# Patient Record
Sex: FEMALE | Race: BLACK OR AFRICAN AMERICAN | Employment: OTHER | URBAN - METROPOLITAN AREA
[De-identification: names, ages, dates, MRNs, and addresses within clinical notes are randomized per-mention and may not be internally consistent; named-entity substitution may affect disease eponyms.]

---

## 2019-07-05 ENCOUNTER — ANESTHESIA EVENT (OUTPATIENT)
Dept: ENDOSCOPY | Age: 69
End: 2019-07-05
Payer: MEDICARE

## 2019-07-08 ENCOUNTER — ANESTHESIA (OUTPATIENT)
Dept: ENDOSCOPY | Age: 69
End: 2019-07-08
Payer: MEDICARE

## 2019-07-08 ENCOUNTER — HOSPITAL ENCOUNTER (OUTPATIENT)
Age: 69
Setting detail: OUTPATIENT SURGERY
Discharge: HOME | End: 2019-07-08
Attending: INTERNAL MEDICINE | Admitting: INTERNAL MEDICINE
Payer: MEDICARE

## 2019-07-08 VITALS
SYSTOLIC BLOOD PRESSURE: 170 MMHG | RESPIRATION RATE: 16 BRPM | TEMPERATURE: 98.7 F | OXYGEN SATURATION: 98 % | DIASTOLIC BLOOD PRESSURE: 74 MMHG | HEART RATE: 84 BPM | BODY MASS INDEX: 57.52 KG/M2 | HEIGHT: 60 IN | WEIGHT: 293 LBS

## 2019-07-08 LAB — GLUCOSE BLD STRIP.AUTO-MCNC: 135 MG/DL (ref 70–110)

## 2019-07-08 PROCEDURE — 74011250636 HC RX REV CODE- 250/636: Performed by: INTERNAL MEDICINE

## 2019-07-08 PROCEDURE — 74011000250 HC RX REV CODE- 250: Performed by: INTERNAL MEDICINE

## 2019-07-08 PROCEDURE — 74011250636 HC RX REV CODE- 250/636

## 2019-07-08 PROCEDURE — 76040000019: Performed by: INTERNAL MEDICINE

## 2019-07-08 PROCEDURE — 82962 GLUCOSE BLOOD TEST: CPT

## 2019-07-08 RX ORDER — SODIUM CHLORIDE 0.9 % (FLUSH) 0.9 %
5-40 SYRINGE (ML) INJECTION AS NEEDED
Status: CANCELLED | OUTPATIENT
Start: 2019-07-08

## 2019-07-08 RX ORDER — SODIUM CHLORIDE, SODIUM LACTATE, POTASSIUM CHLORIDE, CALCIUM CHLORIDE 600; 310; 30; 20 MG/100ML; MG/100ML; MG/100ML; MG/100ML
50 INJECTION, SOLUTION INTRAVENOUS CONTINUOUS
Status: DISCONTINUED | OUTPATIENT
Start: 2019-07-08 | End: 2019-07-08 | Stop reason: HOSPADM

## 2019-07-08 RX ORDER — INSULIN LISPRO 100 [IU]/ML
INJECTION, SOLUTION INTRAVENOUS; SUBCUTANEOUS ONCE
Status: DISCONTINUED | OUTPATIENT
Start: 2019-07-08 | End: 2019-07-08 | Stop reason: HOSPADM

## 2019-07-08 RX ORDER — SODIUM CHLORIDE 0.9 % (FLUSH) 0.9 %
5-40 SYRINGE (ML) INJECTION EVERY 8 HOURS
Status: CANCELLED | OUTPATIENT
Start: 2019-07-08

## 2019-07-08 RX ORDER — SODIUM CHLORIDE 0.9 % (FLUSH) 0.9 %
5-40 SYRINGE (ML) INJECTION AS NEEDED
Status: DISCONTINUED | OUTPATIENT
Start: 2019-07-08 | End: 2019-07-08 | Stop reason: HOSPADM

## 2019-07-08 RX ORDER — DEXTROMETHORPHAN/PSEUDOEPHED 2.5-7.5/.8
1.2 DROPS ORAL
Status: CANCELLED | OUTPATIENT
Start: 2019-07-08

## 2019-07-08 RX ORDER — HYDROMORPHONE HYDROCHLORIDE 4 MG/1
4 TABLET ORAL
COMMUNITY

## 2019-07-08 RX ORDER — MIDAZOLAM HYDROCHLORIDE 1 MG/ML
INJECTION, SOLUTION INTRAMUSCULAR; INTRAVENOUS AS NEEDED
Status: DISCONTINUED | OUTPATIENT
Start: 2019-07-08 | End: 2019-07-08 | Stop reason: HOSPADM

## 2019-07-08 RX ORDER — SODIUM CHLORIDE 0.9 % (FLUSH) 0.9 %
5-40 SYRINGE (ML) INJECTION EVERY 8 HOURS
Status: DISCONTINUED | OUTPATIENT
Start: 2019-07-08 | End: 2019-07-08 | Stop reason: HOSPADM

## 2019-07-08 RX ORDER — FENTANYL CITRATE 50 UG/ML
INJECTION, SOLUTION INTRAMUSCULAR; INTRAVENOUS
Status: DISCONTINUED
Start: 2019-07-08 | End: 2019-07-08 | Stop reason: HOSPADM

## 2019-07-08 RX ORDER — MIDAZOLAM HYDROCHLORIDE 1 MG/ML
INJECTION, SOLUTION INTRAMUSCULAR; INTRAVENOUS
Status: DISCONTINUED
Start: 2019-07-08 | End: 2019-07-08 | Stop reason: HOSPADM

## 2019-07-08 NOTE — PERIOP NOTES
Pt is a very difficult IV stick, after multiple attempts from RN and CRNA, anesthesia placed a 24g to right AC with good flash. Once pt was in procedure room and IV sedation was starting IV noted to be infiltrated, MD decided to have the procedure re-scheduled with PICC nurse to obtain IV access.

## 2019-07-08 NOTE — PERIOP NOTES
Pre-Op Summary    Pt arrived via car with family/friend and is oriented to time, place, person and situation. Patient with unsteady gait with wheelchair assistive devices. Visit Vitals  /68   Pulse 83   Temp 98.7 °F (37.1 °C)   Resp 20   Ht 5' (1.524 m)   Wt 150.8 kg (332 lb 8 oz)   SpO2 98%   BMI 64.94 kg/m²       Patients belongings are located with with daughter. Patient's point of contact is [employer]  and their contact number is: [phone]. They will be in the waiting room. They are able to receive medication information. They will be their ride home.

## 2019-07-08 NOTE — H&P
Date of Surgery Update: Uzma Aguirre was seen and examined. History and physical has been reviewed. The patient has been examined.  There have been no significant clinical changes since the completion of the originally dated History and Physical.    Signed By: Janelle Burns MD     July 8, 2019 11:21 AM

## 2019-07-08 NOTE — DISCHARGE INSTRUCTIONS
Patient armband removed and given to patient to take home. Patient was informed of the privacy risks if armband lost or stolen    DISCHARGE SUMMARY from Nurse    PATIENT INSTRUCTIONS:    After general anesthesia or intravenous sedation, for 24 hours or while taking prescription Narcotics:  · Limit your activities  · Do not drive and operate hazardous machinery  · Do not make important personal or business decisions  · Do  not drink alcoholic beverages  · If you have not urinated within 8 hours after discharge, please contact your surgeon on call. Report the following to your surgeon:  · Excessive pain, swelling, redness or odor of or around the surgical area  · Temperature over 100.5  · Nausea and vomiting lasting longer than 4 hours or if unable to take medications  · Any signs of decreased circulation or nerve impairment to extremity: change in color, persistent  numbness, tingling, coldness or increase pain  · Any questions    What to do at Home:    These are general instructions for a healthy lifestyle:    No smoking/ No tobacco products/ Avoid exposure to second hand smoke  Surgeon General's Warning:  Quitting smoking now greatly reduces serious risk to your health. Obesity, smoking, and sedentary lifestyle greatly increases your risk for illness    A healthy diet, regular physical exercise & weight monitoring are important for maintaining a healthy lifestyle    You may be retaining fluid if you have a history of heart failure or if you experience any of the following symptoms:  Weight gain of 3 pounds or more overnight or 5 pounds in a week, increased swelling in our hands or feet or shortness of breath while lying flat in bed. Please call your doctor as soon as you notice any of these symptoms; do not wait until your next office visit. The discharge information has been reviewed with the {PATIENT PARENT GUARDIAN:32836}. The {PATIENT PARENT GUARDIAN:40785} verbalized understanding.   Discharge medications reviewed with the {[employer] meds reviewed GOTS:17308} and appropriate educational materials and side effects teaching were provided.   ___________________________________________________________________________________________________________________________________

## 2019-07-08 NOTE — ANESTHESIA PREPROCEDURE EVALUATION
Relevant Problems   No relevant active problems       Anesthetic History   No history of anesthetic complications            Review of Systems / Medical History  Patient summary reviewed, nursing notes reviewed and pertinent labs reviewed    Pulmonary        Sleep apnea    Asthma        Neuro/Psych              Cardiovascular    Hypertension        Dysrhythmias   CAD    Exercise tolerance: >4 METS     GI/Hepatic/Renal     GERD           Endo/Other    Diabetes    Morbid obesity and arthritis     Other Findings              Physical Exam    Airway  Mallampati: III  TM Distance: 4 - 6 cm  Neck ROM: normal range of motion   Mouth opening: Normal     Cardiovascular  Regular rate and rhythm,  S1 and S2 normal,  no murmur, click, rub, or gallop  Rhythm: regular  Rate: normal         Dental  No notable dental hx       Pulmonary  Breath sounds clear to auscultation               Abdominal  GI exam deferred       Other Findings            Anesthetic Plan    ASA: 3  Anesthesia type: MAC          Induction: Intravenous  Anesthetic plan and risks discussed with: Patient

## 2019-07-08 NOTE — PERIOP NOTES
Phase 2 Recovery Summary  Patient arrived to Phase 2 at 1321  Report received from BOD[facility]  (ENDO)  Vitals:    07/08/19 1119 07/08/19 1304   BP: 148/68 170/74   Pulse: 83 84   Resp: 20 16   Temp: 98.7 °F (37.1 °C)    SpO2: 98% 98%   Weight: 150.8 kg (332 lb 8 oz)    Height: 5' (1.524 m)        oriented to time, place, person and situation    Patient's IV infiltrated in ENDO room. No peripheral IV in place upon arrival to phase 2 recovery. Warm compress placed to right arm for comfort. Instructed patient and daughter in waiting to take any sips of water for  15-20 minutes due to Lidocaine used in ENDO. Daughter and patient verbalized understanding.         Patient discharged to home with daughter  at [facility]

## 2019-07-22 ENCOUNTER — ANESTHESIA EVENT (OUTPATIENT)
Dept: ENDOSCOPY | Age: 69
End: 2019-07-22
Payer: MEDICARE

## 2019-07-23 ENCOUNTER — HOSPITAL ENCOUNTER (OUTPATIENT)
Age: 69
Setting detail: OUTPATIENT SURGERY
Discharge: HOME | End: 2019-07-23
Attending: INTERNAL MEDICINE | Admitting: INTERNAL MEDICINE
Payer: MEDICARE

## 2019-07-23 ENCOUNTER — ANESTHESIA (OUTPATIENT)
Dept: ENDOSCOPY | Age: 69
End: 2019-07-23
Payer: MEDICARE

## 2019-07-23 VITALS
HEART RATE: 86 BPM | WEIGHT: 293 LBS | HEIGHT: 60 IN | BODY MASS INDEX: 57.52 KG/M2 | OXYGEN SATURATION: 94 % | SYSTOLIC BLOOD PRESSURE: 125 MMHG | TEMPERATURE: 98.4 F | DIASTOLIC BLOOD PRESSURE: 58 MMHG | RESPIRATION RATE: 18 BRPM

## 2019-07-23 LAB
GLUCOSE BLD STRIP.AUTO-MCNC: 185 MG/DL (ref 70–110)
GLUCOSE BLD STRIP.AUTO-MCNC: 196 MG/DL (ref 70–110)

## 2019-07-23 PROCEDURE — 74011250636 HC RX REV CODE- 250/636: Performed by: NURSE ANESTHETIST, CERTIFIED REGISTERED

## 2019-07-23 PROCEDURE — 88342 IMHCHEM/IMCYTCHM 1ST ANTB: CPT

## 2019-07-23 PROCEDURE — 76040000019: Performed by: INTERNAL MEDICINE

## 2019-07-23 PROCEDURE — 77030021593 HC FCPS BIOP ENDOSC BSC -A: Performed by: INTERNAL MEDICINE

## 2019-07-23 PROCEDURE — 76060000031 HC ANESTHESIA FIRST 0.5 HR: Performed by: INTERNAL MEDICINE

## 2019-07-23 PROCEDURE — 88305 TISSUE EXAM BY PATHOLOGIST: CPT

## 2019-07-23 PROCEDURE — 74011250636 HC RX REV CODE- 250/636

## 2019-07-23 PROCEDURE — 74011636637 HC RX REV CODE- 636/637: Performed by: NURSE ANESTHETIST, CERTIFIED REGISTERED

## 2019-07-23 PROCEDURE — 82962 GLUCOSE BLOOD TEST: CPT

## 2019-07-23 RX ORDER — PROPOFOL 10 MG/ML
INJECTION, EMULSION INTRAVENOUS AS NEEDED
Status: DISCONTINUED | OUTPATIENT
Start: 2019-07-23 | End: 2019-07-23 | Stop reason: HOSPADM

## 2019-07-23 RX ORDER — CLOPIDOGREL BISULFATE 75 MG/1
75 TABLET ORAL
COMMUNITY
End: 2022-04-19

## 2019-07-23 RX ORDER — SODIUM CHLORIDE, SODIUM LACTATE, POTASSIUM CHLORIDE, CALCIUM CHLORIDE 600; 310; 30; 20 MG/100ML; MG/100ML; MG/100ML; MG/100ML
25 INJECTION, SOLUTION INTRAVENOUS CONTINUOUS
Status: DISCONTINUED | OUTPATIENT
Start: 2019-07-23 | End: 2019-07-23 | Stop reason: HOSPADM

## 2019-07-23 RX ORDER — SODIUM CHLORIDE 0.9 % (FLUSH) 0.9 %
5-40 SYRINGE (ML) INJECTION AS NEEDED
Status: DISCONTINUED | OUTPATIENT
Start: 2019-07-23 | End: 2019-07-23 | Stop reason: HOSPADM

## 2019-07-23 RX ORDER — DEXTROMETHORPHAN/PSEUDOEPHED 2.5-7.5/.8
1.2 DROPS ORAL
Status: DISCONTINUED | OUTPATIENT
Start: 2019-07-23 | End: 2019-07-23 | Stop reason: HOSPADM

## 2019-07-23 RX ORDER — SODIUM CHLORIDE 0.9 % (FLUSH) 0.9 %
5-40 SYRINGE (ML) INJECTION EVERY 8 HOURS
Status: DISCONTINUED | OUTPATIENT
Start: 2019-07-23 | End: 2019-07-23 | Stop reason: HOSPADM

## 2019-07-23 RX ORDER — MAGNESIUM SULFATE 100 %
4 CRYSTALS MISCELLANEOUS AS NEEDED
Status: CANCELLED | OUTPATIENT
Start: 2019-07-23

## 2019-07-23 RX ORDER — FAMOTIDINE 20 MG/1
20 TABLET, FILM COATED ORAL ONCE
Status: DISCONTINUED | OUTPATIENT
Start: 2019-07-23 | End: 2019-07-23 | Stop reason: HOSPADM

## 2019-07-23 RX ORDER — INSULIN LISPRO 100 [IU]/ML
INJECTION, SOLUTION INTRAVENOUS; SUBCUTANEOUS ONCE
Status: COMPLETED | OUTPATIENT
Start: 2019-07-23 | End: 2019-07-23

## 2019-07-23 RX ORDER — SODIUM CHLORIDE, SODIUM LACTATE, POTASSIUM CHLORIDE, CALCIUM CHLORIDE 600; 310; 30; 20 MG/100ML; MG/100ML; MG/100ML; MG/100ML
75 INJECTION, SOLUTION INTRAVENOUS CONTINUOUS
Status: CANCELLED | OUTPATIENT
Start: 2019-07-23

## 2019-07-23 RX ORDER — DIPHENHYDRAMINE HYDROCHLORIDE 50 MG/ML
25 INJECTION, SOLUTION INTRAMUSCULAR; INTRAVENOUS
Status: CANCELLED | OUTPATIENT
Start: 2019-07-23

## 2019-07-23 RX ADMIN — PROPOFOL 30 MG: 10 INJECTION, EMULSION INTRAVENOUS at 12:09

## 2019-07-23 RX ADMIN — SODIUM CHLORIDE, SODIUM LACTATE, POTASSIUM CHLORIDE, AND CALCIUM CHLORIDE: 600; 310; 30; 20 INJECTION, SOLUTION INTRAVENOUS at 11:30

## 2019-07-23 RX ADMIN — PROPOFOL 50 MG: 10 INJECTION, EMULSION INTRAVENOUS at 12:06

## 2019-07-23 RX ADMIN — INSULIN LISPRO 3 UNITS: 100 INJECTION, SOLUTION INTRAVENOUS; SUBCUTANEOUS at 11:51

## 2019-07-23 RX ADMIN — PROPOFOL 50 MG: 10 INJECTION, EMULSION INTRAVENOUS at 12:05

## 2019-07-23 NOTE — H&P
Date of Surgery Update: Oneyda Colunga was seen and examined. History and physical has been reviewed. The patient has been examined.  There have been no significant clinical changes since the completion of the originally dated History and Physical.    Signed By: Wendi Lee MD     July 23, 2019 11:22 AM

## 2019-07-23 NOTE — PROCEDURES
Endoscopy Procedure Note    Patient: Aria Davis MRN: [id number]  SSN: [id number]    YOB: 1950  Age: 79 y.o. Sex: female      Date/Time:  7/23/2019 12:14 PM    Esophagogastroduodenoscopy (EGD) Procedure Note    Procedure: Esophagogastroduodenoscopy with biopsy    IMPRESSION:   1. Gastritis. Biopsies taken for H. Pylori. 2. Small 5mm gastric polyp. Most likely fundic gland polyp. Biopsies taken. 3. Retained food, concerning for gastroparesis. 4. Otherwise normal EGD. RECOMMENDATIONS:  1. Resume regular diet. 2. Continue omeprazole. 3. Will contact with biopsy results in 1 week. 4. Follow up in office in 3-4 weeks. Indication: Abdominal pain, epigastric  :  Cristian Breaux MD  Assistants: Endoscopy RN-1: Jama Noriega RN    Referring Provider:   Randy Rosario MD  History: The history and physical exam were reviewed and updated. Endoscope: Olympus GIF-190 diagnostic endoscope  Extent of Exam: second portion of the duodenum  ASA: ASA 3 - Patient with moderate systemic disease with functional limitations  Anethesia/Sedation:  MAC anesthesia    Description of the procedure: The procedure was discussed with the patient including risks, benefits, alternatives including risks of iv sedation, bleeding, perforation and aspiration. A safety timeout was performed. The patient was placed in the left lateral decubitus position. A bite block was placed. The patient was given incremental doses of intravenous sedation until moderate sedation was achieved. The patients vital signs were monitored at all times including heart rate/rhythm, blood pressure and oxygen saturation. The endoscope was then passed under direct visualization to the second portion of the duodenum. The endoscope was then slowly withdrawn while visualizing the mucosa. In the stomach a retroflexion was performed and gastric fundus and cardia visualized.   The endoscope was then slowly withdrawn. The patient was then transferred to recovery in stable condition. Findings:    Esophagus: The esophageal mucosa was normal with no ulceration, mass or stricture. There was no evidence of Pelayo's esophagus or reflux esophagitis. Stomach: There was a large amount of food retained in the stomach. The gastric mucosa was edematous and erythematous. Biopsies taken for H. Pylori. There was a small 6mm sessile polyp in the gastric body. Biopsies taken. No ulceration or mass or stricture. Duodenum: The duodenum mucosa was normal with no ulceration, mass, stricture and no evidence of villous atrophy. Four pinch biopsies taken for celiac disease. Therapies:  None    Specimens: * No specimens in log *            Complications:   None; patient tolerated the procedure well.     EBL:None    Discharge disposition:  Home in the company of  when able to ambulate    Oj Del Castillo MD  July 23, 2019  12:14 PM

## 2019-07-23 NOTE — ANESTHESIA POSTPROCEDURE EVALUATION
Post-Anesthesia Evaluation and Assessment    Patient: Abhishek Osborn MRN: [id number]  SSN: [id number]    YOB: 1950  Age: 79 y.o. Sex: female       Cardiovascular Function/Vital Signs  Visit Vitals  /58   Pulse 88   Temp 36.9 °C (98.4 °F)   Resp 18   Ht 5' (1.524 m)   Wt 148.8 kg (328 lb)   SpO2 93%   BMI 64.06 kg/m²       Patient is status post MAC anesthesia for Procedure(s):  ESOPHAGOGASTRODUODENOSCOPY (EGD). Nausea/Vomiting: None    Postoperative hydration reviewed and adequate. Pain:  Pain Scale 1: Numeric (0 - 10) (07/23/19 1139)  Pain Intensity 1: 0 (07/23/19 1139)   Managed    Neurological Status: At baseline    Mental Status and Level of Consciousness: Alert and oriented     Pulmonary Status:   O2 Device: Room air (07/23/19 1218)   Adequate oxygenation and airway patent    Complications related to anesthesia: None    Post-anesthesia assessment completed. No concerns    Signed By: Rebecca Simons CRNA     July 23, 2019              Procedure(s):  ESOPHAGOGASTRODUODENOSCOPY (EGD).     MAC    <BSHSIANPOST>    Vitals Value Taken Time   /58 7/23/2019 12:18 PM   Temp     Pulse 88 7/23/2019 12:18 PM   Resp     SpO2 93 % 7/23/2019 12:18 PM

## 2019-07-23 NOTE — DISCHARGE INSTRUCTIONS
Patient Discharge Instructions    Tiffany Lassiter / [id number] : 1950    Admitted 2019 Discharged: 2019         Procedure Impression:  1. Gastritis. Biopsies taken for H. Pylori. 2. Small 5mm gastric polyp. Most likely fundic gland polyp. Biopsies taken. 3. Retained food, concerning for gastroparesis. 4. Otherwise normal EGD. Recommendation:  1. Resume regular diet. 2. Will contact with biopsy results in 1-2 weeks   3. Continue prilosec. 4. Follow up in office in 4 weeks. Recommended Diet: Regular Diet    Recommended Activity:    1. Do not drink alcohol, drive or operate machinery for 12 hours   2. Call if any fever, abdominal pain or bleeding noted. Signed By: Kenrick Altamirano MD     2019         Patient Education        Upper GI Endoscopy: What to Expect at [address] [place] [address]  After you have an endoscopy, you will stay at the hospital or clinic for 1 to 2 hours. This will allow the medicine to wear off. You will be able to go home after your doctor or nurse checks to make sure you are not having any problems. You may have to stay overnight if you had treatment during the test. You may have a sore throat for a day or two after the test.  This care sheet gives you a general idea about what to expect after the test.  How can you care for yourself at home? Activity  · Rest as much as you need to after you go home. · You should be able to go back to your usual activities the day after the test.  Diet  · Follow your doctor's directions for eating after the test.  · Drink plenty of fluids (unless your doctor has told you not to). Medications  · If you have a sore throat the day after the test, use an over-the-counter spray to numb your throat. Follow-up care is a key part of your treatment and safety. Be sure to make and go to all appointments, and call your doctor if you are having problems.  It's also a good idea to know your test results and keep a list of the medicines you take. When should you call for help? Call 911 anytime you think you may need emergency care. For example, call if:    · You passed out (loses consciousness).     · You have trouble breathing.     · You pass maroon or bloody stools.    Call your doctor now or seek immediate medical care if:    · You have pain that does not get better after your take pain medicine.     · You have new or worse belly pain.     · You have blood in your stools.     · You are sick to your stomach and cannot keep fluids down.     · You have a fever.     · You cannot pass stools or gas.    Watch closely for changes in your health, and be sure to contact your doctor if:    · Your throat still hurts after a day or two.     · You do not get better as expected. Where can you learn more? Go to http://ronaldo-adele.info/. Enter ([phone] in the search box to learn more about \"Upper GI Endoscopy: What to Expect at Home. \"  Current as of: November 7, 2018  Content Version: 12.1  © 3944-7190 [employer]. Care instructions adapted under license by [employer] (which disclaims liability or warranty for this information). If you have questions about a medical condition or this instruction, always ask your healthcare professional. [address] any warranty or liability for your use of this information.

## 2019-07-23 NOTE — ANESTHESIA PREPROCEDURE EVALUATION
Relevant Problems   No relevant active problems       Anesthetic History   No history of anesthetic complications            Review of Systems / Medical History  Patient summary reviewed and pertinent labs reviewed    Pulmonary        Sleep apnea: CPAP           Neuro/Psych   Within defined limits           Cardiovascular    Hypertension        Dysrhythmias       Exercise tolerance: >4 METS     GI/Hepatic/Renal  Within defined limits              Endo/Other    Diabetes: type 2    Morbid obesity     Other Findings   Comments:                  Physical Exam    Airway  Mallampati: III  TM Distance: 4 - 6 cm  Neck ROM: normal range of motion   Mouth opening: Normal     Cardiovascular  Regular rate and rhythm,  S1 and S2 normal,  no murmur, click, rub, or gallop  Rhythm: regular  Rate: normal         Dental  No notable dental hx       Pulmonary  Breath sounds clear to auscultation               Abdominal  GI exam deferred       Other Findings            Anesthetic Plan    ASA: 4  Anesthesia type: MAC          Induction: Intravenous  Anesthetic plan and risks discussed with: Patient

## 2019-07-23 NOTE — ADDENDUM NOTE
Addendum  created 07/23/19 1225 by Shanta Hernandes MD    Attestation recorded in [address] [place] [address], [facility] 97 filed

## 2020-01-01 PROBLEM — K52.9 COLITIS: Status: ACTIVE | Noted: 2020-01-01

## 2022-03-19 PROBLEM — K52.9 COLITIS: Status: ACTIVE | Noted: 2020-01-01

## 2022-04-19 ENCOUNTER — OFFICE VISIT (OUTPATIENT)
Dept: SURGERY | Age: 72
End: 2022-04-19
Payer: MEDICARE

## 2022-04-19 VITALS
WEIGHT: 293 LBS | BODY MASS INDEX: 57.52 KG/M2 | HEIGHT: 60 IN | TEMPERATURE: 97.5 F | HEART RATE: 81 BPM | OXYGEN SATURATION: 98 % | RESPIRATION RATE: 14 BRPM | SYSTOLIC BLOOD PRESSURE: 163 MMHG | DIASTOLIC BLOOD PRESSURE: 72 MMHG

## 2022-04-19 DIAGNOSIS — M15.9 PRIMARY OSTEOARTHRITIS INVOLVING MULTIPLE JOINTS: ICD-10-CM

## 2022-04-19 DIAGNOSIS — J45.50 UNCOMPLICATED SEVERE PERSISTENT ASTHMA: ICD-10-CM

## 2022-04-19 DIAGNOSIS — K21.9 GASTROESOPHAGEAL REFLUX DISEASE, UNSPECIFIED WHETHER ESOPHAGITIS PRESENT: ICD-10-CM

## 2022-04-19 DIAGNOSIS — E11.29 TYPE 2 DIABETES MELLITUS WITH OTHER KIDNEY COMPLICATION, UNSPECIFIED WHETHER LONG TERM INSULIN USE (HCC): ICD-10-CM

## 2022-04-19 DIAGNOSIS — E11.65 UNCONTROLLED TYPE 2 DIABETES MELLITUS WITH HYPERGLYCEMIA (HCC): ICD-10-CM

## 2022-04-19 DIAGNOSIS — E55.9 VITAMIN D DEFICIENCY: ICD-10-CM

## 2022-04-19 DIAGNOSIS — E11.43 DIABETIC GASTROPARESIS (HCC): ICD-10-CM

## 2022-04-19 DIAGNOSIS — E78.5 HYPERLIPIDEMIA, UNSPECIFIED HYPERLIPIDEMIA TYPE: ICD-10-CM

## 2022-04-19 DIAGNOSIS — I47.1 PSVT (PAROXYSMAL SUPRAVENTRICULAR TACHYCARDIA) (HCC): ICD-10-CM

## 2022-04-19 DIAGNOSIS — G47.33 OBSTRUCTIVE SLEEP APNEA SYNDROME: ICD-10-CM

## 2022-04-19 DIAGNOSIS — I25.10 CORONARY ARTERY DISEASE, UNSPECIFIED VESSEL OR LESION TYPE, UNSPECIFIED WHETHER ANGINA PRESENT, UNSPECIFIED WHETHER NATIVE OR TRANSPLANTED HEART: ICD-10-CM

## 2022-04-19 DIAGNOSIS — Z01.818 PREOP EXAMINATION: ICD-10-CM

## 2022-04-19 DIAGNOSIS — E66.01 MORBID OBESITY (HCC): Primary | ICD-10-CM

## 2022-04-19 DIAGNOSIS — R60.9 EDEMA, UNSPECIFIED TYPE: ICD-10-CM

## 2022-04-19 DIAGNOSIS — E55.9 VITAMIN D DEFICIENCY, UNSPECIFIED: ICD-10-CM

## 2022-04-19 DIAGNOSIS — M17.0 PRIMARY OSTEOARTHRITIS OF BOTH KNEES: ICD-10-CM

## 2022-04-19 DIAGNOSIS — I10 HYPERTENSION, UNSPECIFIED TYPE: ICD-10-CM

## 2022-04-19 DIAGNOSIS — R06.02 SOB (SHORTNESS OF BREATH): ICD-10-CM

## 2022-04-19 DIAGNOSIS — K31.84 DIABETIC GASTROPARESIS (HCC): ICD-10-CM

## 2022-04-19 DIAGNOSIS — I26.99 PULMONARY EMBOLISM, UNSPECIFIED CHRONICITY, UNSPECIFIED PULMONARY EMBOLISM TYPE, UNSPECIFIED WHETHER ACUTE COR PULMONALE PRESENT (HCC): ICD-10-CM

## 2022-04-19 PROBLEM — M25.512 CHRONIC PAIN OF BOTH SHOULDERS: Status: ACTIVE | Noted: 2021-07-07

## 2022-04-19 PROBLEM — M79.3 PANNICULITIS: Status: ACTIVE | Noted: 2019-03-18

## 2022-04-19 PROBLEM — M25.511 CHRONIC PAIN OF BOTH SHOULDERS: Status: ACTIVE | Noted: 2021-07-07

## 2022-04-19 PROBLEM — G89.29 CHRONIC PAIN OF BOTH SHOULDERS: Status: ACTIVE | Noted: 2021-07-07

## 2022-04-19 PROCEDURE — 99205 OFFICE O/P NEW HI 60 MIN: CPT | Performed by: SURGERY

## 2022-04-19 RX ORDER — HYOSCYAMINE SULFATE 0.12 MG/5ML
LIQUID ORAL
COMMUNITY
Start: 2015-01-01

## 2022-04-19 RX ORDER — INSULIN LISPRO 100 [IU]/ML
INJECTION, SOLUTION INTRAVENOUS; SUBCUTANEOUS
COMMUNITY

## 2022-04-19 RX ORDER — TIMOLOL/DORZOLAMIDE/LATANOP/PF 0.5-2-.005
DROPS OPHTHALMIC (EYE)
COMMUNITY
Start: 2022-02-22

## 2022-04-19 RX ORDER — PANTOPRAZOLE SODIUM 40 MG/1
40 TABLET, DELAYED RELEASE ORAL DAILY
COMMUNITY
Start: 2022-02-22

## 2022-04-19 NOTE — LETTER
4/19/2022    Patient: Tamar Vanegas   YOB: 1950   Date of Visit: 4/19/2022     Ariel Arceo  [address] [address]  Via Fax: [phone]    Dear Tino Maya MD,      MsEvans Musa Jorge visited with me at  [place] [address] for evaluation. My notes for this consultation are attached. If you have questions, please do not hesitate to call me. I look forward to following your patient along with you.       Sincerely,    Estrellita Hernandez MD

## 2022-04-19 NOTE — PROGRESS NOTES
Juana Diaz is a 73 y.o. female (: 1950) presenting to address:    Chief Complaint   Patient presents with    New Patient     bariatric consult       Medication list and allergies have been reviewed with Juana Diaz and updated as of today's date. I have gone over all Medical, Surgical and Social History with Juana Diaz and updated/added the information accordingly.

## 2022-04-19 NOTE — PROGRESS NOTES
Bariatric Surgery Consultation    CC: Consultation regarding surgical treatment options for morbid obesity and related comorbidities  Subjective: The patient is a 73 y.o. obese  female with a Body mass index is 66.4 kg/m². . They have been considering surgery for some time now. The patient presents to the clinic today to discuss surgical weight loss options. They have made multiple attempts at weight loss over the years without success. They have tried low-carb, low calorie, high-protein diets. Unfortunately, none of these have lead to meaningful, sustained weight loss. They have attended the bariatric seminar before the visit. History of saddle PE last year likely secondary to COVID infection, with embolectomy (IR). Denies nausea, vomiting, dysphagia  Reports reflux, well controlled with PPI. History of diabetic gastroparesis (intolerant of reglan).  Chronic bloating    Pre op weight: 349  EBW: 229  Goal Weight Calc Women: 165.8  Wt loss to date: 5     Patient Active Problem List    Diagnosis Date Noted    Edema 04/19/2022    Chronic pain of both shoulders 07/07/2021    Primary osteoarthritis of both knees 07/07/2021    Pulmonary embolism ([address] 04/17/2021    SOB (shortness of breath) 12/08/2020    Colitis 01/01/2020    Panniculitis 03/18/2019    Type II diabetes mellitus with complication, uncontrolled 11/25/2016    Preseptal cellulitis 40/40/5830    Uncomplicated severe persistent asthma 10/21/2015    Uncontrolled type 2 diabetes mellitus with hyperglycemia ([address] 07/29/2014    Right flank pain 06/24/2014    Diabetes mellitus with renal complications (HCC)     HTN (hypertension)     Hyperlipidemia     CAD (coronary artery disease)     RAD (reactive airway disease)     Diabetic gastroparesis (HCC)     PSVT (paroxysmal supraventricular tachycardia) (HCC)     Sleep apnea     Heel spur     GERD (gastroesophageal reflux disease)     DJD (degenerative joint disease), multiple sites Morbid obesity ([facility]     Vitamin D deficiency     Chronic pain of both knees 10/22/2007      Past Medical History:   Diagnosis Date    Asthma     CAD (coronary artery disease) 2005    insignif = 20% les mid LAD    Colitis     Colitis     Diabetes ([facility]     Diabetes mellitus with renal complications ([facility] 6647    Diabetic gastroparesis (HCC)     DJD (degenerative joint disease), multiple sites     GERD (gastroesophageal reflux disease)     Heel spur 2007    left    HTN (hypertension)     Hyperlipemia     Hyperlipidemia     Hypertension     Morbid obesity ([facility]     Osteoarthritis     PSVT (paroxysmal supraventricular tachycardia) ([facility] 2006    Pulmonary embolism (HCC)     RAD (reactive airway disease)     Sleep apnea 2007    CPAP    Vitamin D deficiency      Past Surgical History:   Procedure Laterality Date    COLONOSCOPY N/A 2/13/2020    DIAGNOSTIC COLONOSCOPY /c Hot Snared Polypectomy performed by Pippa Figueroa MD at [address] [place] [address]    HX BREAST REDUCTION      HX CHOLECYSTECTOMY  2018    HX SHOULDER ARTHROSCOPY  2008    left decompression    HX TUBAL LIGATION  1982    [place] LEG/ANKLE SURGERY PROC UNLISTED      benign cyst left ankle      Social History     Tobacco Use    Smoking status: Never Smoker    Smokeless tobacco: Never Used   Substance Use Topics    Alcohol use: No      Family History   Problem Relation Age of Onset    Cancer Brother         throat    Hypertension Sister     Diabetes Sister     Diabetes Sister     Hypertension Sister     Diabetes Brother       Prior to Admission medications    Medication Sig Start Date End Date Taking? Authorizing Provider   pantoprazole (PROTONIX) 40 mg tablet Take 40 mg by mouth daily.  2/22/22  Yes Provider, Historical   timolol/dorzolamide/latanop/PF (timoloL-dorzolamid-latanop,PF,) 0.5-2-0.005 % drop  2/22/22  Yes Provider, Historical   hyoscyamine (LEVSIN) 0.125 mg/5 mL elixir  1/1/15  Yes Provider, Historical   insulin glargine U-300 conc (TOUJEO MAX U-300 SOLOSTAR) 300 unit/mL (3 mL) inpn 150 Units by SubCUTAneous route nightly. Yes Provider, Historical   dicyclomine (BENTYL) 20 mg tablet Take  by mouth two (2) times a day. 1/14/20  Yes Provider, Historical   furosemide (LASIX) 20 mg tablet Take  by mouth daily. 12/23/19  Yes Provider, Historical   MYRBETRIQ 50 mg ER tablet Take  by mouth daily. 1/22/20  Yes Provider, Historical   celecoxib (CELEBREX) 200 mg capsule Take  by mouth daily. 12/6/19  Yes Provider, Historical   pregabalin (LYRICA) 150 mg capsule Take  by mouth daily. 12/22/19  Yes Provider, Historical   ondansetron (ZOFRAN ODT) 4 mg disintegrating tablet Take 1 Tab by mouth every eight (8) hours as needed for Nausea. 12/28/19  Yes DiganisaniDylon dwyer PA-C   HYDROmorphone (DILAUDID) 4 mg tablet Take 4 mg by mouth every six (6) hours as needed for Pain. Yes Provider, Historical   amLODIPine (NORVASC) 10 mg tablet Take 10 mg by mouth daily. Yes Other, MD Rudolph   montelukast (SINGULAIR) 10 mg tablet Take 10 mg by mouth daily. Yes Other, MD Rudolph   albuterol (PROVENTIL HFA, VENTOLIN HFA, PROAIR HFA) 90 mcg/actuation inhaler Take 2 Puffs by inhalation every four (4) hours as needed for Wheezing. Yes Other, MD Rudolph   nitroglycerin (NITROSTAT) 0.4 mg SL tablet 0.4 mg by SubLINGual route every five (5) minutes as needed for Chest Pain. Yes Other, MD Rudolph   insulin aspart (NOVOLOG FLEXPEN) 100 unit/mL flexpen by SubCUTAneous route. Indications: 45-65 units sliding scale, see math   Yes Provider, Historical   rosuvastatin (CRESTOR) 40 mg tablet Take 40 mg by mouth daily. NON FORMULARY    Yes Provider, Historical   losartan (COZAAR) 100 mg tablet Take 25 mg by mouth daily. Yes Provider, Historical   isosorbide mononitrate ER (IMDUR) 30 mg tablet Take 1 Tab by mouth daily. 9/9/10  Yes Bernarda Govea,    VICTOZA 3-JESU 0.6 mg/0.1 mL (18 mg/3 mL) pnij by SubCUTAneous route daily.  2/6/20   Provider, Historical   clopidogrel (PLAVIX) 75 mg tab Take 75 mg by mouth. Indications: treatment to prevent a heart attack    Provider, Historical   Cholecalciferol, Vitamin D3, 50,000 unit cap Take  by mouth every Monday. Provider, Historical   omeprazole (PRILOSEC) 40 mg capsule Take 40 mg by mouth two (2) times a day.  Indications: GASTROESOPHAGEAL REFLUX    Provider, Historical     Allergies   Allergen Reactions    Shellfish Derived Anaphylaxis    Glucophage [Metformin] Diarrhea    Reglan [Metoclopramide] Other (comments)     jittery    Ultram [Tramadol] Other (comments)     No help for DJD pain        Review of Systems:    Constitutional: No fever or chills  Neurologic: No headache  Eyes: No scleral icterus or irritated eyes  Nose: No nasal pain or drainage  Mouth: No oral lesions or sore throat  Cardiac: No palpations or chest pain  Pulmonary: No cough or shortness of breath  Gastrointestinal: No nausea, emesis, diarrhea, or constipation  Genitourinary: No dysuria  Musculoskeletal: No muscle or joint tenderness  Skin: No rashes or lesions  Psychiatric: No anxiety or depressed mood    Pertinent positives: see HPI      Objective:     Physical Exam:  Visit Vitals  BP (!) 163/72 (BP 1 Location: Left arm)   Pulse 81   Temp 97.5 °F (36.4 °C)   Resp 14   Ht 5' (1.524 m)   Wt 154.2 kg (340 lb)   SpO2 98%   BMI 66.40 kg/m²     General: No acute distress, conversant  Eyes: PERRLA, no scleral icterus  HENT: Normocephalic without oral lesions  Neck: Trachea midline  Cardiac: Normal pulse rate and rhythm, no edema, capillary refill normal 1 second  Pulmonary: Symmetric chest rise with normal effort, clear to auscultation though mildly obscured by body habitus  GI: Soft, NT, ND, no hernia, no splenomegaly  Skin: Warm without rash  Extremities: No edema or joint stiffness, moves all extremities symmetrically, steady gait  Psych: Appropriate mood and affect    Assessment:     Morbid obesity with comorbidities  Plan:   The patient presents today with severe obesity and obesity related risks/comorbidities as detailed above. The patient has made multiple unsuccessful attempts at weight loss as detailed above. The patient desires surgical weight loss for a better chance of lifelong weight control and control of co-morbidities. They have attended the bariatric seminar and meet the qualifications for surgery based on the NIH criteria. We have discussed the various surgical options including the sleeve gastrectomy, gastric bypass, duodenal switch/modified duodenal switch. We also discussed non operative alternatives. The patient is currently interested in the sleeve gastrectomy. I believe they are a good candidate for this procedure. They will need to a/an UGI and EGD to evaluate their anatomy pre operatively. H pylori antigen/breath test ordered as well. We have discussed the possible complications of bariatric surgery which include but are not limited to failed weight loss, weight regain, malnutrition, leak, bleed, stricture, gastric ulcer, gastric fistula, gastric bleed, gallstones, new or worsening gastric reflux, nausea, emesis, internal hernia, abdominal wall hernia, gastric perforation, need for revision / conversion / or reversal, pregnancy complications and loss, intestinal ischemia, post operative skin complications, possible thinning of their hair, bowel obstruction, dumping syndrome, wound infection, blood clots (DVT, mesenteric thrombus, pulmonary embolism), increased addictive tendency, risk of anesthesia, and death. The patient understands this is a life altering decision and will require compliance to the program for the remainder of their life in order to be monitored to avoid complication and ensure successful, sustained weight control. They will be placed on a lifelong low carbohydrate and low sugar diet, exercise, and vitamin regimen and will require frequent blood draws and office visits to ensure adequate nutrition and program compliance.  Visits and follow up will be in compliance with the guidelines set forth by [facility]. I have specifically mentioned the need to avoid all personal and second-hand tobacco exposure, systemic steroids, and NSAIDS after any bariatric surgery to help avoid the above listed complications. The patient has expressed understanding of the above and would like to enroll in the program. The patient will be submitted for medical and psychological clearance along with establishing with out dietician and joining the pre / post operative support group. They will be screened for depression and sleep apnea and treated pre operatively if needed. After successful completion of the preoperative regimen the patient will be submitted for insurance approval and pending this will be scheduled for surgery. Tests/clearances ordered:  CBC, CMP, A1c, H pylori, Vitamin D, CXR, EKG, EGD (with Dr. Dereje Cuello), UGI, lipid panel, albumin. Gastric emptying study  Nutrition, support group, PCP clearance, psychological clearance, cardiologist clearance (Dr. Stevan Brown), endocrinologist clearance (Dr. Lea Norris), pulmonary clearance       All questions from the patient have been answered and they have demonstrated appropriate understanding of the process. RESULTS:  Psych clearance PENDING counseling, PCP follow up regarding chronic pain and mood, and therapist sessions  EGD: reports and photos appear unremarkable, Dereje Cuello MD endoscopist  EGD path: mild/moderate chronic gastritis, no H pylori on stains  [facility] report: [facility] at [facility]: Significantly diminished esophageal motility/presbyesophagus, JUWAN without evidence of esophagitis or stricture  NEED TO REVIEW SLEEVE VS BYPASS AGAIN.   Decreased eGFR (47.1, normal >60)  A1c: 7.1  Hgb 11.3  Normal vitamin D  NM GES mildly abnormal with 11% residual activity at 4 hours    Signed By: Corrina Patiño MD [facility]  Bariatric and General Surgeon  [address] [place] [address] [facility]    April 19, 2022

## 2022-04-22 ENCOUNTER — HOSPITAL ENCOUNTER (OUTPATIENT)
Dept: BARIATRICS/WEIGHT MGMT | Age: 72
Discharge: HOME | End: 2022-04-22

## 2022-04-27 ENCOUNTER — DOCUMENTATION ONLY (OUTPATIENT)
Dept: BARIATRICS/WEIGHT MGMT | Age: 72
End: 2022-04-27

## 2022-04-27 NOTE — PROGRESS NOTES
Patient was a No Show for Weight Loss Trial. I have contacted the patient in an attempt to reschedule.    Kevin Siu RDN

## 2022-05-31 ENCOUNTER — OFFICE VISIT (OUTPATIENT)
Dept: SURGERY | Age: 72
End: 2022-05-31
Payer: MEDICARE

## 2022-05-31 DIAGNOSIS — E66.01 MORBID OBESITY (HCC): ICD-10-CM

## 2022-05-31 DIAGNOSIS — E11.43 DIABETIC GASTROPARESIS (HCC): ICD-10-CM

## 2022-05-31 DIAGNOSIS — E11.29 TYPE 2 DIABETES MELLITUS WITH OTHER KIDNEY COMPLICATION, UNSPECIFIED WHETHER LONG TERM INSULIN USE (HCC): ICD-10-CM

## 2022-05-31 DIAGNOSIS — K31.84 DIABETIC GASTROPARESIS (HCC): ICD-10-CM

## 2022-05-31 DIAGNOSIS — K21.9 GASTROESOPHAGEAL REFLUX DISEASE, UNSPECIFIED WHETHER ESOPHAGITIS PRESENT: ICD-10-CM

## 2022-05-31 DIAGNOSIS — Z71.89 ENCOUNTER FOR PRE-BARIATRIC SURGERY COUNSELING AND EDUCATION: Primary | ICD-10-CM

## 2022-05-31 PROCEDURE — 99213 OFFICE O/P EST LOW 20 MIN: CPT | Performed by: NURSE PRACTITIONER

## 2022-05-31 NOTE — PROGRESS NOTES
Bariatric Preoperative Progress Note    Subjective:     Lorrie Gambino is a 73 y.o. female who presents today for followup of their candidacy for bariatric surgery. Since last seen, Lorrie Gambino has been working through bariatric program towards gastric sleeve with  Dr. Janelle Wren. Pre op weight: 5  Today's weight: 345    Past Medical History:   Diagnosis Date    Asthma     CAD (coronary artery disease) 2005    insignif = 20% les mid LAD    Colitis     Colitis     Diabetes ([address]     Diabetes mellitus with renal complications ([address] 4236    Diabetic gastroparesis ([address]     DJD (degenerative joint disease), multiple sites     GERD (gastroesophageal reflux disease)     Heel spur 2007    left    HTN (hypertension)     Hyperlipemia     Hyperlipidemia     Hypertension     Morbid obesity ([address]     Osteoarthritis     PSVT (paroxysmal supraventricular tachycardia) ([address] 2006    Pulmonary embolism (HCC)     RAD (reactive airway disease)     Sleep apnea 2007    CPAP    Vitamin D deficiency        Past Surgical History:   Procedure Laterality Date    COLONOSCOPY N/A 2/13/2020    DIAGNOSTIC COLONOSCOPY /c Hot Snared Polypectomy performed by Lucian Borrero MD at [address]    HX BREAST REDUCTION      HX CHOLECYSTECTOMY  2018    lap    HX SHOULDER ARTHROSCOPY  2008    left decompression    HX TUBAL LIGATION  1982    lap    [place] LEG/ANKLE SURGERY PROC UNLISTED      benign cyst left ankle       Current Outpatient Medications   Medication Sig Dispense Refill    pantoprazole (PROTONIX) 40 mg tablet Take 40 mg by mouth daily.  timolol/dorzolamide/latanop/PF (timoloL-dorzolamid-latanop,PF,) 0.5-2-0.005 % drop       hyoscyamine (LEVSIN) 0.125 mg/5 mL elixir       insulin lispro (HUMALOG) 100 unit/mL injection by SubCUTAneous route. 45-65U sliding scale      apixaban (ELIQUIS) 5 mg tablet Take 5 mg by mouth two (2) times a day.       insulin glargine U-300 conc (TOUJEO MAX U-300 SOLOSTAR) 300 unit/mL (3 mL) inpn 150 Units by SubCUTAneous route nightly.  dicyclomine (BENTYL) 20 mg tablet Take  by mouth two (2) times a day.  furosemide (LASIX) 20 mg tablet Take  by mouth daily.  MYRBETRIQ 50 mg ER tablet Take  by mouth daily.  celecoxib (CELEBREX) 200 mg capsule Take  by mouth daily.  pregabalin (LYRICA) 150 mg capsule Take  by mouth daily.  ondansetron (ZOFRAN ODT) 4 mg disintegrating tablet Take 1 Tab by mouth every eight (8) hours as needed for Nausea. 12 Tab 0    HYDROmorphone (DILAUDID) 4 mg tablet Take 4 mg by mouth every six (6) hours as needed for Pain.  amLODIPine (NORVASC) 10 mg tablet Take 10 mg by mouth daily.  montelukast (SINGULAIR) 10 mg tablet Take 10 mg by mouth daily.  albuterol (PROVENTIL HFA, VENTOLIN HFA, PROAIR HFA) 90 mcg/actuation inhaler Take 2 Puffs by inhalation every four (4) hours as needed for Wheezing.  nitroglycerin (NITROSTAT) 0.4 mg SL tablet 0.4 mg by SubLINGual route every five (5) minutes as needed for Chest Pain.  Cholecalciferol, Vitamin D3, 50,000 unit cap Take  by mouth every Monday.  rosuvastatin (CRESTOR) 40 mg tablet Take 40 mg by mouth daily. NON FORMULARY       losartan (COZAAR) 100 mg tablet Take 25 mg by mouth daily.  isosorbide mononitrate ER (IMDUR) 30 mg tablet Take 1 Tab by mouth daily. 30 Tab 6       Allergies   Allergen Reactions    Shellfish Derived Anaphylaxis    Glucophage [Metformin] Diarrhea    Reglan [Metoclopramide] Other (comments)     jittery    Ultram [Tramadol] Other (comments)     No help for DJD pain and upset stomach       ROS:  Review of Systems   Constitutional: Positive for weight loss. Respiratory: Positive for shortness of breath. Gastrointestinal: Positive for heartburn. Musculoskeletal: Positive for joint pain. All other systems reviewed and are negative.       Objective:     Physical Exam:  There were no vitals taken for this visit. Physical Exam  Vitals and nursing note reviewed. Constitutional:       Appearance: She is obese. HENT:      Head: Normocephalic and atraumatic. Pulmonary:      Effort: Pulmonary effort is normal.   Musculoskeletal:      Comments: In Wheelchair   Neurological:      General: No focal deficit present. Mental Status: She is alert and oriented to person, place, and time. Psychiatric:         Mood and Affect: Mood normal.         Behavior: Behavior normal.       Studies to date:    Labs: not completed    EKG: not completed    CXR: not completed    UGI: not completed, gave number for central scheduling    Nutritional evaluation: Per pt, has completed 1 of 4, but chart states she no-showed first WLT. Will reach out to Ananth church RD to confirm/reschedule. Psychiatric evaluation: not completed, she will reach out to Dr. Jai Her office    Support Group: not completed    Additional evaluations: Has endocrinology follow up in July, will get clearance then. Cardio and PCP will not give clearance until she has a surgery date. EGD scheduled with Dr. Darryl Dominique on 7/26    Assessment:   Neda Whiitng is a 73 y.o. female who is progressing through the bariatric preoperative evaluation. At this time, they are not an appropriate candidate for weight loss surgery until completion of below requirements. Plan:   -Complete remainder of preop evaluation including WLT classes, support group, labs, EKG, UGI, EGD, and requested clearances.   -Patient communicates understanding that the expectation is to lose or maintain weight during WLT. Weight gain may result in delay or cancellation of surgery.   -Follow up once has completed entirety of weight loss workup to determine next steps.       Tripp Webber NP

## 2022-06-01 VITALS — WEIGHT: 293 LBS | BODY MASS INDEX: 67.5 KG/M2

## 2022-06-06 ENCOUNTER — HOSPITAL ENCOUNTER (OUTPATIENT)
Dept: BARIATRICS/WEIGHT MGMT | Age: 72
Discharge: HOME | End: 2022-06-06

## 2022-06-07 ENCOUNTER — DOCUMENTATION ONLY (OUTPATIENT)
Dept: BARIATRICS/WEIGHT MGMT | Age: 72
End: 2022-06-07

## 2022-06-07 NOTE — PROGRESS NOTES
[address] [place] [address] [facility], [address]    Patient's Name: Lana Francois   Age: 73 y.o. YOB: 1950   Sex: female    Date:   5/23/2022    Insurance:  Medicare  A & B  Session: 1 of 4  Surgeon:  Evonne Valdez    Height: 60 inches   Weight:    345      Lbs. BMI: 67.5    Pounds Lost since last month: 0              Pounds Gained since last month: 5    Starting Weight: 340     Previous Months Weight: 340  Overall Pounds Lost: 0   Overall Pounds Gained: 5    Patient has not been to a support group meeting. Do you smoke? no    Alcohol intake:  Number of drinks at a time:  non    Class Guidelines    Guidelines are reviewed with patient at the start of every class. 1. Patient understands that weight loss trial classes must be consecutive. Patient understands if they miss a class, it is their responsibility to contact me to reschedule class. I will reach out to patient after their first no show. 2.  Patient understands the expectations that weight maintenance/weight loss is expected during the classes. Failure to demonstrate changes may result in one extra month of weight loss trial, followed by going back to see the surgeon. Patient understands that they CAN NOT gain any weight during the weight loss trial.  Gaining weight will result in extra classes. 3. Patient is also instructed to be doing their labs, blood work, psych visit, support group and any other test that the surgeon has used while they are working on their weight loss trial.  4.  Patient was instructed to bring their blue binder to every class and appoin    Changes Made Since Last Class: drink less sodas and sweets    Eating Habits and Behaviors      We started off class today by reviewing key diet principles. Patient was given a very specific list of foods that they can eat, which included meat, fish, vegetables, eggs, cheese, fats, soy, and berries.   Patient was also given a list of foods that should be avoided. These included sweets (candy, soda, baked goods, ice cream), and starches, including pasta, rice, crackers, chips, oatmeal, bread. We talked about appropriate protein-based snacks, including deli meat, low fat cheese, yogurt, hummus, small handful of almonds. We talked about working on sipping fluid throughout the day and working towards 64 ounces. I have recommended water, Crystal light, sugar free drinks, but eliminating soda, sweet tea, and fruit juices. I also gave a power point on ways to help with the metabolism. Some ways that can help boost one's metabolism include healthy snacking. Eating 6 small meals in the pre op phase can help keep the metabolism revved up. It is recommended to focus on protein-based snacks. Exercise is another way to increase resting metabolic rate. The more lean muscle one has, the more calories they will burn at rest.  Dehydration can slow down one's metabolism, as well. Patient is encouraged to keep sipping to work towards 64 ounces of fluid per day. Protein is another booster for metabolism. Foods high in carbohydrates and fat slow down the metabolism. Patient's current diet habits include: Patient is eating 3 meals a day. Patient states their portions are 20 g. I have encouraged patient to use a smaller plate and fill up on water before meals to help cut down on portions. Patient is eating out: 1* Patient's is eating bread, rice, pasta, cereal, and other carbohydrates. Patient is snacking on 1 I have talked about the importance of getting into the habit now of cutting the sweets out. Fluid intake:  64 ounces of water, 1 can sprite zero12 ounces of soda, , de-caf caffeine. Patient is encouraged not to drink calories and stick to non-carbonated, non-caffeine, sugar free drinks. The goal is 64 ounces of fluid per day.       Physical Activity/Exercise    Comments:     Currently for exercise, patient is none. We talked about activities for patient to do, including walking, swimming, or chair exercises. I also talked with patient about doing some strength training, which helps the metabolism, as well. Patient understands the importance of establishing an activity routine and that surgery is only a small piece of puzzle, but exercise and diet changes will play a role in long term success. Behavior Modification       Comments: In the power point, Portion Control: The importance of measuring out portions and reading food labels. This  include not skipping meals and being sure to feed and fuel that body rather than going large gaps and putting the body in starvation mode. Patient is encouraged to eat within 1 hour of waking up and have a cut off time in the evening. We talked about night time syndrome where a person may skip breakfast and lunch and then consume the majority of their calories from dinner until bed time. I  have talked about how important it it to get 3 meals in per day, eat breakfast, and BREAK THE FAST. One goal for next month includes:  1.   Drop sodas      Negra Paul  6/7/2022

## 2022-06-13 ENCOUNTER — HOSPITAL ENCOUNTER (OUTPATIENT)
Dept: BARIATRICS/WEIGHT MGMT | Age: 72
Discharge: HOME | End: 2022-06-13

## 2022-06-14 ENCOUNTER — DOCUMENTATION ONLY (OUTPATIENT)
Dept: BARIATRICS/WEIGHT MGMT | Age: 72
End: 2022-06-14

## 2022-06-14 NOTE — PROGRESS NOTES
[address] [place] Frank [address] [facility], [address]    Patient's Name: Taj Quiles   Age: 73 y.o. YOB: 1950   Sex: female    Date:   6/13/2022    Insurance:  medicare      Session: 2 of  4    Surgeon:  Burke Valles    Height:60 inches Weight:    345      Lbs. BMI: 67.5   Pounds Lost since last month: 0               Pounds Gained since last month: 0    Starting Weight: 340   Previous Months Weight: 345  Overall Pounds Lost: 0 Overall Pounds Gained: 5      Do you smoke? no    Alcohol intake:  Number of drinks at a time:  none      Class Guidelines    Guidelines are reviewed with patient at the start of every class. 1. Patient understands that weight loss trial classes must be consecutive. Patient understands if they miss a class, it is their responsibility to contact me to reschedule class. I will reach out to patient after their first no show. 2.  Patient understands the expectations that weight maintenance/weight loss is expected during the classes. Failure to demonstrate changes may result in one extra month of weight loss trial, followed by going back to see the surgeon. Patient understands that they CAN NOT gain any weight during the weight loss trial.  Gaining weight will result in extra classes. 3. Patient is also instructed to be doing their labs, blood work, psych visit, support group and any other test that the surgeon has used while they are working on their weight loss trial.  4.  Patient was instructed to bring their blue binder to every class and appointment. Changes Made Since Last Class: drink less sodas, eating less sweets and less chips    Eating Habits and Behaviors    Today in class, I reviewed a power point that discussed Nutrition, Behavior, and Exercise changes to start working on.   Some of the eating behaviors that we discussed included the importance of eating breakfast.  We talked about some of the reasons that people don't eat breakfast and food choices that would be appropriate. We also talked about avoiding liquid calories. Patient is encouraged to aim for 64 ounces of fluid per day and trying to get them from water, Crystal Light, sugar free drinks. We also talked about eating out options that are healthier. Patient was encouraged to always request sauces and dressings on the side and request a salad, broth-based soup, or vegetables in place of fries. Patient's current diet habits include: Patient's current diet habits include: Patient is eating 3 meals a day. Patient states their portion sizes are 20. I have encouraged patient to use a smaller plate and fill up on water before meals to help cut down on portions. Patient's is eating bread, rice, pasta, cereal, and other carbohydrates. I have talked about the importance of getting into the habit now of cutting the sweets out. Fluid intake:  64 ounces of water, crystal light, Sprite Zero  1 can. Fantana Strawberrry  12 ounces of soda, 8 ounces of fruit juice, decaf  caffeine. Patient is encouraged not to drink calories and stick to non-carbonated, non-caffeine, sugar free drinks. The goal is 64 ounces of fluid per day. Physical Activity/Exercise    Comments: We talked about exercise. Patient was given reasons of why exercise is so important and how that can help with their long-term success. I have encouraged patient to get a support system to help with the activity. Currently for activity, patient is doing none. We have talked about goals for activity to incorporated. Behavior Modification       Comments: We also talked about behavior modifications. We talked about eating triggers, such as eating in front of the TV and solutions, such as making the TV a no eating zone. If patient is eating out out of emotion, food will only temporarily solve that.   Patient is encouraged to HALT and assess if they are eating because they are Hungry, or out of emotions: Anxious, Lonely, Tired, which the food will only temporarily solve. We also talked about ways to prevent relapse. Goals that patient wants to work on includes:  1. Drinking less sugary sodas  2.  Less fried foods      Rashmi Kimble RDN  6/14/2022

## 2022-06-18 LAB
25(OH)D3 SERPL-MCNC: 38.6 NG/ML (ref 32–100)
A-G RATIO,AGRAT: 1.1 RATIO (ref 1.1–2.6)
ABSOLUTE LYMPHOCYTE COUNT, 10803: 1.7 K/UL (ref 1–4.8)
ALBUMIN SERPL-MCNC: 4 G/DL (ref 3.5–5)
ALP SERPL-CCNC: 110 U/L (ref 40–120)
ALT SERPL-CCNC: 17 U/L (ref 5–40)
ANION GAP SERPL CALC-SCNC: 8 MMOL/L (ref 3–15)
AST SERPL W P-5'-P-CCNC: 27 U/L (ref 10–37)
AVG GLU, 10930: 157 MG/DL (ref 91–123)
BASOPHILS # BLD: 0 K/UL (ref 0–0.2)
BASOPHILS NFR BLD: 0 % (ref 0–2)
BILIRUB SERPL-MCNC: 0.2 MG/DL (ref 0.2–1.2)
BUN SERPL-MCNC: 26 MG/DL (ref 6–22)
CALCIUM SERPL-MCNC: 9.8 MG/DL (ref 8.4–10.5)
CHLORIDE SERPL-SCNC: 108 MMOL/L (ref 98–110)
CHOLEST SERPL-MCNC: 101 MG/DL (ref 110–200)
CO2 SERPL-SCNC: 29 MMOL/L (ref 20–32)
CREAT SERPL-MCNC: 1.2 MG/DL (ref 0.8–1.4)
EOSINOPHIL # BLD: 0.2 K/UL (ref 0–0.5)
EOSINOPHIL NFR BLD: 2 % (ref 0–6)
ERYTHROCYTE [DISTWIDTH] IN BLOOD BY AUTOMATED COUNT: 19.2 % (ref 10–15.5)
GLOBULIN,GLOB: 3.8 G/DL (ref 2–4)
GLOMERULAR FILTRATION RATE: 47.1 ML/MIN/1.73 SQ.M.
GLUCOSE SERPL-MCNC: 60 MG/DL (ref 70–99)
GRANULOCYTES,GRANS: 62 % (ref 40–75)
HBA1C MFR BLD HPLC: 7.1 % (ref 4.8–5.6)
HCT VFR BLD AUTO: 36.2 % (ref 35.1–48.3)
HDLC SERPL-MCNC: 2.1 MG/DL (ref 0–5)
HDLC SERPL-MCNC: 47 MG/DL
HGB BLD-MCNC: 11.3 G/DL (ref 11.7–16.1)
LDL/HDL RATIO,LDHD: 0.7
LDLC SERPL CALC-MCNC: 34 MG/DL (ref 50–99)
LYMPHOCYTES, LYMLT: 25 % (ref 20–45)
MCH RBC QN AUTO: 26 PG (ref 26–34)
MCHC RBC AUTO-ENTMCNC: 31 G/DL (ref 31–36)
MCV RBC AUTO: 82 FL (ref 80–99)
MONOCYTES # BLD: 0.8 K/UL (ref 0.1–1)
MONOCYTES NFR BLD: 11 % (ref 3–12)
NEUTROPHILS # BLD AUTO: 4.2 K/UL (ref 1.8–7.7)
NON-HDL CHOLESTEROL, 011976: 54 MG/DL
PLATELET # BLD AUTO: 322 K/UL (ref 140–440)
PMV BLD AUTO: 10.2 FL (ref 9–13)
POTASSIUM SERPL-SCNC: 3.8 MMOL/L (ref 3.5–5.5)
PROT SERPL-MCNC: 7.8 G/DL (ref 6.2–8.1)
RBC # BLD AUTO: 4.43 M/UL (ref 3.8–5.2)
SODIUM SERPL-SCNC: 145 MMOL/L (ref 133–145)
TRIGL SERPL-MCNC: 99 MG/DL (ref 40–149)
VLDLC SERPL CALC-MCNC: 20 MG/DL (ref 8–30)
WBC # BLD AUTO: 6.8 K/UL (ref 4–11)

## 2022-07-28 ENCOUNTER — HOSPITAL ENCOUNTER (OUTPATIENT)
Dept: BARIATRICS/WEIGHT MGMT | Age: 72
Discharge: HOME | End: 2022-07-28

## 2022-07-31 ENCOUNTER — DOCUMENTATION ONLY (OUTPATIENT)
Dept: BARIATRICS/WEIGHT MGMT | Age: 72
End: 2022-07-31

## 2022-07-31 NOTE — PROGRESS NOTES
[address] [place] Frank [address] [facility], [address]    Patient's Name: Jennifer Zarate   Age: 81 y.o. YOB: 1950   Sex: female    Date:   7/28/2022    Insurance:  medicare a & b            Session: 3 of  4  Revision:     Surgeon:  Carola Her    Height: 60 inches   Weight:    340      Lbs. BMI: 66.5   Pounds Lost since last month: 5                 Pounds Gained since last month: 0    Starting Weight: 340     Previous Months Weight: 345  Overall Pounds Lost: 0   Overall Pounds Gained: 0      Do you smoke? no    Alcohol intake:  Number of drinks at a time:  none    Class Guidelines    Guidelines are reviewed with patient at the start of every class. 1. Patient understands that weight loss trial classes must be consecutive. Patient understands if they miss a class, it is their responsibility to contact me to reschedule class. I will reach out to patient after their first no show. 2.  Patient understands the expectations that weight maintenance/weight loss is expected during the classes. Failure to demonstrate changes may result in one extra month of weight loss trial, followed by going back to see the surgeon. Patient understands that they CAN NOT gain any weight during the weight loss trial.  Gaining weight will result in extra classes. 3. Patient is also instructed to be doing their labs, blood work, psych visit, support group and any other test that the surgeon has used while they are working on their weight loss trial.  4.  Patient was instructed to bring their blue binder to every class and appointment. Changes Made Since Last Class: trying to eat right    Eating Habits and Behaviors    Today in class, we started talking about the key diet principles. We first focused on stopping liquid calories. Patient was also educated on carbohydrates.   Patient was instructed to start cutting out bread, rice, and pasta from the diet and start focusing more on meat and vegetables. I then gave a power point, which focused on Label Reading. In class, I gave patients a labels and we worked through a series of questions to help patients have a better understanding of label reading. Patient was instructed to review the serving size. Patient was encouraged to focus on protein and carbohydrates. We also did a few label reading activities to help the patient become more familiar with label reading. Patient's current diet habits include: Patient's current diet habits include: Patient is eating 3 meals a day. Patient states their portion sizes are smaller. I have encouraged patient to use a smaller plate and fill up on water before meals to help cut down on portions. Patient's is eating bread, rice, pasta, cereal, and other carbohydrates. I have talked about the importance of getting into the habit now of cutting the sweets out. Fluid intake:  64 ounces of water, crystal light, unsweetened tea. 12 ounces of soda, 12 ounces of sweet tea  Patient is encouraged not to drink calories and stick to non-carbonated, non-caffeine, sugar free drinks. The goal is 64 ounces of fluid per day. Physical Activity/Exercise    Comments: We talked about exercise. Patient was given reasons of why exercise is so important and how that can help with their long-term success. I have encouraged patient to get a support system to help with the activity. Currently for activity, patient is doing sitting in wheel chair. Behavior Modification       Comments:  Behavior modifications were reinforced. This included not eating in front of the TV, which could lead to bigger portions and eating when one is not hungry. We also talked about the importance of eating 3 meals per day. Patient was encouraged to food journal to keep their daily carbohydrates less than 30 grams per meal.      Goals that patient wants to work on includes:  1.   Better eating and drinking habits    Brenton Esteban RDN  7/31/2022

## 2022-08-29 ENCOUNTER — HOSPITAL ENCOUNTER (OUTPATIENT)
Dept: BARIATRICS/WEIGHT MGMT | Age: 72
Discharge: HOME | End: 2022-08-29

## 2022-09-27 ENCOUNTER — DOCUMENTATION ONLY (OUTPATIENT)
Dept: BARIATRICS/WEIGHT MGMT | Age: 72
End: 2022-09-27

## 2022-09-27 NOTE — PROGRESS NOTES
[address] [place] [address] [facility], [address]    Patient's Name: Lisa Lugo   Age: 81 y.o. YOB: 1950   Sex: female  Date: 8/29/2022        Insurance:  medicare A&B            Session: 4 of  4  Surgeon:  Gilson Rodriguez    Height: 60 inches   Current Weight:    339.2      Lbs. BMI: 66.3   Pounds Lost since last month: 1                 Pounds Gained since last month: 0      Starting Weight: 340     Previous Months Weight: 340  Overall Pounds Lost: 1   Overall Pounds Gained: 0    Do you smoke? no    Alcohol intake:  Number of drinks at a time:  none      Class Guidelines    Guidelines are reviewed with patient at the start of every class. 1. Patient understands that weight loss trial classes must be consecutive. Patient understands if they miss a class, it is their responsibility to contact me to reschedule class. I will reach out to patient after their first no show. 2.  Patient understands the expectations that weight maintenance/weight loss is expected during the classes. Failure to demonstrate changes may result in one extra month of weight loss trial, followed by going back to see the surgeon. Patient understands that they CAN NOT gain any weight during the weight loss trial.  Gaining weight will result in extra classes. 3. Patient is also instructed to be doing their labs, blood work, psych visit, support group and any other test that the surgeon has used while they are working on their weight loss trial.  4.  Patient was instructed to bring their blue binder to every class and appointment. Changes Made Since Last Class: drink less sodas    Eating Habits and Behaviors    Today in class, we reviewed the key diet principles. I have talked to patient about pushing the fluid and working towards 64 ounces per day. We focused on following a low-calorie diet.   Patient was instructed to count their carbohydrates and try to keep their daily intake under 75 grams per day and try to keep their daily protein at 80 grams per day. Patient was given examples of carbohydrates in starches. Patient was encouraged to focus on meat and vegetables and begin cutting carbohydrates out. We talked about foods that are protein-based and how to incorporate those into their meals. I also reviewed with patient the importance of eating 3 meals per day and suggestions were made for breakfast items. Patient's current diet habits include: Patient is eating 3 meals per day. Patient is encouraged to fill up on protein first, then vegetables, and work on cutting back on the carbohydrates. Portions are: smaller. Patient is encouraged to use a smaller plate to help cut down on portion sizes. Patient is drinking 64 ounces of water, sprite zero ounces 8 ounces of fruit juices, decaf ounces of caffeine. Patient is encouraged to continue to cut out liquid calories and aim for achieving 64 ounces of fluid per day. Physical Activity/Exercise    Comments: We talked about exercise. Patient was given reasons of why exercise is so important and how that can help with their long-term success. I have encouraged patient to get a support system to help with the activity. For activity, patient is unable to exercise at this time due to knee issues. We have talked about goals, which include starting off walking and building upon that. Patient is also encouraged to add in some light weights to get some strength training. Behavior Modification       Comments:  During today's lesson, I gave a presentation called The 100-200 Calorie \"Mindless Margin. \"  The goal is to make modest daily 100-200 calorie reductions in certain things that the body won't notice. One, 100-200 calorie change and would will look 10-20 pounds in one year. An example could be cutting soda.   Patient was given a check off list and was encouraged to come up with 1-3 100 calories changes they could make. The check off list is a daily tracker to see if these goals are being met. Goals that patient wants to work on includes:  1.  Better food control        Kalyn Shen RD  9/27/2022

## 2022-09-27 NOTE — PROGRESS NOTES
[address] Loss [place] [address] [facility], [address]    Patient's Name: Tosha Jain   Age: 81 y.o. YOB: 1950   Sex: female    Date:   7/28/2022    Insurance:  Medicare  A & B          Session: 3 of  4  Revision:     Surgeon:  Charlee Licea    Height: 60 inches   Weight:    340      Lbs. BMI:  66.5  Pounds Lost since last month: 5                 Pounds Gained since last month: 0    Starting Weight: 340     Previous Months Weight: 345  Overall Pounds Lost: 5   Overall Pounds Gained: 0      Do you smoke? no    Alcohol intake:  Number of drinks at a time:  none      Class Guidelines    Guidelines are reviewed with patient at the start of every class. 1. Patient understands that weight loss trial classes must be consecutive. Patient understands if they miss a class, it is their responsibility to contact me to reschedule class. I will reach out to patient after their first no show. 2.  Patient understands the expectations that weight maintenance/weight loss is expected during the classes. Failure to demonstrate changes may result in one extra month of weight loss trial, followed by going back to see the surgeon. Patient understands that they CAN NOT gain any weight during the weight loss trial.  Gaining weight will result in extra classes. 3. Patient is also instructed to be doing their labs, blood work, psych visit, support group and any other test that the surgeon has used while they are working on their weight loss trial.  4.  Patient was instructed to bring their blue binder to every class and appointment. Changes Made Since Last Class: stopped  fried foods and sweets    Eating Habits and Behaviors    Today in class, we started talking about the key diet principles. We first focused on stopping liquid calories. Patient was also educated on carbohydrates.   Patient was instructed to start cutting out bread, rice, and pasta from the diet and start focusing more on meat and vegetables. I then gave a power point, which focused on Label Reading. In class, I gave patients a labels and we worked through a series of questions to help patients have a better understanding of label reading. Patient was instructed to review the serving size. Patient was encouraged to focus on protein and carbohydrates. We also did a few label reading activities to help the patient become more familiar with label reading. Patient's current diet habits include: Patient's current diet habits include: Patient is eating 3 meals a day. Patient states their portion sizes are smaller. I have encouraged patient to use a smaller plate and fill up on water before meals to help cut down on portions. Patient's is eating bread, rice, pasta, cereal, and other carbohydrates. I have talked about the importance of getting into the habit now of cutting the sweets out. Fluid intake:  64 ounces of water, crystal light, unsweetened tea. 8 ounces of fruit juice, decaf ounces of caffeine. Patient is encouraged not to drink calories and stick to non-carbonated, non-caffeine, sugar free drinks. The goal is 64 ounces of fluid per day. Physical Activity/Exercise    Comments: We talked about exercise. Patient was given reasons of why exercise is so important and how that can help with their long-term success. I have encouraged patient to get a support system to help with the activity. Currently for activity, patient is doing none due to knee issues. Behavior Modification       Comments:  Behavior modifications were reinforced. This included not eating in front of the TV, which could lead to bigger portions and eating when one is not hungry. We also talked about the importance of eating 3 meals per day.   Patient was encouraged to food journal to keep their daily carbohydrates less than 30 grams per meal.      Goals that patient wants to work on includes:  1.eating less carbs      Stephani Peck RDN  9/27/2022

## 2022-09-27 NOTE — PROGRESS NOTES
Nutrition Evaluation    Patient's Name: Dinesh Lopez   Age: 81 y.o. YOB: 1950   Sex: female    Height: 60 inches Weight: 339.2 lbs  BMI:  66.3  Starting Weight:  340        Smoking Status:  none  Alcohol Intake:  Number of Drinks at a Time: none      Changes made during classes include:  Drink less sodas        Two things that patient learned during this weight loss trial:  Make better food choices  Eat less sweets    Summary:  I feel that Dinesh Lopez has demonstrated appropriate diet changes and is ready to move forward with surgery. Patient has been briefed on the importance of the protein drinks, vitamins, and the transition of the diet stages. Patient understands that the long-term diet will focus on protein and vegetables. Patient understand the effects of carbohydrates after surgery and what reactive hypoglycemia is. Patient is aware that they will be attending pre-op class 2 weeks before surgery and will get more detailed information on the post-op diet guidelines. Patient will see me again at 6 weeks post-op. At this 6 week visit, RD will assess how patient is tolerating soft protein and advance to vegetables, if tolerating soft protein without difficulty. Patient will also see RD again at 9 months post-op. This visit will assess patient's compliance with current protocol, including diet, vitamins, protein shakes, and exercise. Post-op diet guidelines will be reinforced. RD is available for questions and to meet with patient outside of the 6 week and 9 month post-op visit. We spent a lot of time talking about the vitamins. Patient understands the importance of being compliant with the diet protocol and the complications and risks that can occur if they are non-compliant with the nutritional protocol. Patient has attended at least one support group.     Candidate for surgery: Yes  Re-evaluation Date:     Procedure: Gastric Sleeve      Nubia Veliz RDN  9/27/2022

## 2022-12-20 ENCOUNTER — OFFICE VISIT (OUTPATIENT)
Dept: SURGERY | Age: 72
End: 2022-12-20
Payer: MEDICARE

## 2022-12-20 VITALS
RESPIRATION RATE: 16 BRPM | OXYGEN SATURATION: 95 % | TEMPERATURE: 98 F | BODY MASS INDEX: 57.52 KG/M2 | WEIGHT: 293 LBS | HEART RATE: 87 BPM | SYSTOLIC BLOOD PRESSURE: 162 MMHG | DIASTOLIC BLOOD PRESSURE: 74 MMHG | HEIGHT: 60 IN

## 2022-12-20 DIAGNOSIS — E66.01 MORBID OBESITY (HCC): Primary | ICD-10-CM

## 2022-12-20 DIAGNOSIS — K21.9 GASTROESOPHAGEAL REFLUX DISEASE, UNSPECIFIED WHETHER ESOPHAGITIS PRESENT: ICD-10-CM

## 2022-12-20 DIAGNOSIS — Z01.818 PREOP EXAMINATION: ICD-10-CM

## 2022-12-20 DIAGNOSIS — E11.43 DIABETIC GASTROPARESIS (HCC): ICD-10-CM

## 2022-12-20 DIAGNOSIS — E78.5 HYPERLIPIDEMIA, UNSPECIFIED HYPERLIPIDEMIA TYPE: ICD-10-CM

## 2022-12-20 DIAGNOSIS — I10 HYPERTENSION, UNSPECIFIED TYPE: ICD-10-CM

## 2022-12-20 DIAGNOSIS — G47.33 OBSTRUCTIVE SLEEP APNEA SYNDROME: ICD-10-CM

## 2022-12-20 DIAGNOSIS — E11.65 UNCONTROLLED TYPE 2 DIABETES MELLITUS WITH HYPERGLYCEMIA (HCC): ICD-10-CM

## 2022-12-20 DIAGNOSIS — E11.29 TYPE 2 DIABETES MELLITUS WITH OTHER KIDNEY COMPLICATION, UNSPECIFIED WHETHER LONG TERM INSULIN USE (HCC): ICD-10-CM

## 2022-12-20 DIAGNOSIS — I25.10 CORONARY ARTERY DISEASE, UNSPECIFIED VESSEL OR LESION TYPE, UNSPECIFIED WHETHER ANGINA PRESENT, UNSPECIFIED WHETHER NATIVE OR TRANSPLANTED HEART: ICD-10-CM

## 2022-12-20 DIAGNOSIS — K31.84 DIABETIC GASTROPARESIS (HCC): ICD-10-CM

## 2022-12-20 DIAGNOSIS — J45.50 UNCOMPLICATED SEVERE PERSISTENT ASTHMA: ICD-10-CM

## 2022-12-20 PROCEDURE — 3078F DIAST BP <80 MM HG: CPT | Performed by: SURGERY

## 2022-12-20 PROCEDURE — 3074F SYST BP LT 130 MM HG: CPT | Performed by: SURGERY

## 2022-12-20 PROCEDURE — 1123F ACP DISCUSS/DSCN MKR DOCD: CPT | Performed by: SURGERY

## 2022-12-20 PROCEDURE — 99213 OFFICE O/P EST LOW 20 MIN: CPT | Performed by: SURGERY

## 2022-12-20 PROCEDURE — 3051F HG A1C>EQUAL 7.0%<8.0%: CPT | Performed by: SURGERY

## 2022-12-20 RX ORDER — VIBEGRON 75 MG/1
75 TABLET, FILM COATED ORAL DAILY
COMMUNITY
Start: 2022-11-02

## 2022-12-20 NOTE — PROGRESS NOTES
Bariatric Surgery Progress Note    CC: Preop appointment for bariatric surgery  Subjective:     Patient presents for a preop appointment for bariatric surgery having completed the insurance-mandated and clinically-relevant clearances/counseling.      REVIEW:   EGD: reports and photos appear unremarkable, Donte Gipson MD endoscopist  EGD path: mild/moderate chronic gastritis, no H pylori on stains  UGI report: done at [facility]: Significantly diminished esophageal motility/presbyesophagus, JUWAN without evidence of esophagitis or stricture  Decreased eGFR (47.1, normal >60)  A1c: 7.1  Hgb 11.3  Normal vitamin D  NM GES mildly abnormal with 11% residual activity at 4 hours    Cardiac risk stratification in chart  Psych clearance in chart  PCP clearance in chart  Pulm clearance in chart  Endocrine clearance in chart    Previous relevant surgeries: lap lisa, lap tubal    Patient Active Problem List    Diagnosis Date Noted    Edema 04/19/2022    Chronic pain of both shoulders 07/07/2021    Primary osteoarthritis of both knees 07/07/2021    Pulmonary embolism ([address] 04/17/2021    SOB (shortness of breath) 12/08/2020    Colitis 01/01/2020    Panniculitis 03/18/2019    Type II diabetes mellitus with complication, uncontrolled 11/25/2016    Preseptal cellulitis 29/46/2701    Uncomplicated severe persistent asthma 10/21/2015    Uncontrolled type 2 diabetes mellitus with hyperglycemia ([address] 07/29/2014    Right flank pain 06/24/2014    Diabetes mellitus with renal complications (HCC)     HTN (hypertension)     Hyperlipidemia     CAD (coronary artery disease)     RAD (reactive airway disease)     Diabetic gastroparesis (HCC)     PSVT (paroxysmal supraventricular tachycardia) (HCC)     Sleep apnea     Heel spur     GERD (gastroesophageal reflux disease)     DJD (degenerative joint disease), multiple sites     Morbid obesity ([address]     Vitamin D deficiency     Chronic pain of both knees 10/22/2007      Past Medical History: Diagnosis Date    Asthma     CAD (coronary artery disease) 2005    insignif = 20% les mid LAD    Chronic pain 2005    Colitis     Colitis     Di[address]     Diabetes mellitus with renal complications ([facility] 5355    Diabetic gastroparesis ([facility]     DJD (degenerative joint disease), multiple sites     GERD (gastroesophageal reflux disease)     Heel spur 2007    left    HTN (hypertension)     Hyperlipemia     Hyperlipidemia     Hypertension     Liver disease 2005    Morbid obesity ([facility]     Osteoarthritis     PSVT (paroxysmal supraventricular tachycardia) ([facility] 2006    Pulmonary embolism (HCC)     RAD (reactive airway disease)     Sleep apnea 2007    CPAP    Vitamin D deficiency      Past Surgical History:   Procedure Laterality Date    COLONOSCOPY N/A 2/13/2020    DIAGNOSTIC COLONOSCOPY /c Hot Snared Polypectomy performed by Choco Emery MD at [address] [place] [address]    HX BREAST REDUCTION      HX CHOLECYSTECTOMY  2018    lap    HX SHOULDER ARTHROSCOPY  2008    left decompression    HX TUBAL LIGATION  1982    lap    TX LEG/ANKLE SURGERY PROC UNLISTED      benign cyst left ankle      Social History     Tobacco Use    Smoking status: Never    Smokeless tobacco: Never   Substance Use Topics    Alcohol use: No      Family History   Problem Relation Age of Onset    Cancer Brother         throat    Hypertension Sister     Diabetes Sister     Diabetes Sister     Hypertension Sister     Diabetes Brother       Prior to Admission medications    Medication Sig Start Date End Date Taking? Authorizing Provider   Gemtesa 75 mg tablet Take 75 mg by mouth daily. 11/2/22  Yes Provider, Historical   promethazine (PHENERGAN) 12.5 mg tablet Take  by mouth every six (6) hours as needed for Nausea. Yes Provider, Historical   dulaglutide (Trulicity) 6.79 ZK/9.9 mL sub-q pen as directed.    Yes Provider, Historical   latanoprost (XALATAN) 0.005 % ophthalmic solution  7/12/22  Yes Provider, Historical potassium chloride SR (K-TAB) 20 mEq tablet    Yes Provider, Historical   pantoprazole (PROTONIX) 40 mg tablet Take 40 mg by mouth daily. 2/22/22  Yes Provider, Historical   timolol/dorzolamide/latanop/PF (timoloL-dorzolamid-latanop,PF,) 0.5-2-0.005 % drop  2/22/22  Yes Provider, Historical   hyoscyamine (LEVSIN) 0.125 mg/5 mL elixir  1/1/15  Yes Provider, Historical   insulin lispro (HUMALOG) 100 unit/mL injection by SubCUTAneous route. 45-65U sliding scale   Yes Provider, Historical   apixaban (ELIQUIS) 5 mg tablet Take 5 mg by mouth two (2) times a day. Yes Provider, Historical   insulin glargine U-300 conc 300 unit/mL (3 mL) inpn 150 Units by SubCUTAneous route nightly. Yes Provider, Historical   dicyclomine (BENTYL) 20 mg tablet Take  by mouth two (2) times a day. 1/14/20  Yes Provider, Historical   furosemide (LASIX) 20 mg tablet Take  by mouth daily. 12/23/19  Yes Provider, Historical   celecoxib (CELEBREX) 200 mg capsule Take  by mouth daily. 12/6/19  Yes Provider, Historical   pregabalin (LYRICA) 150 mg capsule Take  by mouth daily. 12/22/19  Yes Provider, Historical   ondansetron (ZOFRAN ODT) 4 mg disintegrating tablet Take 1 Tab by mouth every eight (8) hours as needed for Nausea. 12/28/19  Yes DiganisaniHoda dwyer PA-C   HYDROmorphone (DILAUDID) 4 mg tablet Take 4 mg by mouth every six (6) hours as needed for Pain. Yes Provider, Historical   amLODIPine (NORVASC) 10 mg tablet Take 10 mg by mouth daily. Yes Other, MD Rudolph   montelukast (SINGULAIR) 10 mg tablet Take 10 mg by mouth daily. Yes Other, MD Rudolph   albuterol (PROVENTIL HFA, VENTOLIN HFA, PROAIR HFA) 90 mcg/actuation inhaler Take 2 Puffs by inhalation every four (4) hours as needed for Wheezing. Yes Other, MD Rudolph   Cholecalciferol, Vitamin D3, 50,000 unit cap Take  by mouth every Monday. Yes Provider, Historical   rosuvastatin (CRESTOR) 40 mg tablet Take 40 mg by mouth daily.  NON FORMULARY    Yes Provider, Historical   losartan (COZAAR) 100 mg tablet Take 25 mg by mouth daily. Yes Provider, Historical   isosorbide mononitrate ER (IMDUR) 30 mg tablet Take 1 Tab by mouth daily. 9/9/10  Yes Susie Repress, DO     Allergies   Allergen Reactions    Shellfish Derived Anaphylaxis    Glucophage [Metformin] Diarrhea    Reglan [Metoclopramide] Other (comments)     jittery    Ultram [Tramadol] Other (comments)     No help for DJD pain and upset stomach        Review of Systems:    Constitutional: No fever or chills  Neurologic: No headache  Eyes: No scleral icterus or irritated eyes  Nose: No nasal pain or drainage  Mouth: No oral lesions or sore throat  Cardiac: No palpations or chest pain  Pulmonary: No cough or shortness of breath  Gastrointestinal: No nausea, emesis, diarrhea, or constipation  Genitourinary: No dysuria  Musculoskeletal: No muscle or joint tenderness  Skin: No rashes or lesions  Psychiatric: No anxiety or depressed mood      Objective:     Physical Exam:  Visit Vitals  BP (!) 162/74 (BP 1 Location: Left lower arm)   Pulse 87   Temp 98 °F (36.7 °C)   Resp 16   Ht 5' (1.524 m)   Wt 142.4 kg (314 lb)   SpO2 95%   BMI 61.32 kg/m²     General: No acute distress, conversant  Eyes: PERRLA, no scleral icterus  HENT: Normocephalic without oral lesions  Neck: Trachea midline without LAD  Cardiac: Normal pulse rate and rhythm  Pulmonary: Symmetric chest rise with normal effort  GI: Soft, NT, ND, no hernia, no splenomegaly  Skin: Warm without rash  Extremities: No edema or joint stiffness  Psych: Appropriate mood and affect    Assessment:     Morbid obesity with comorbidities  Plan:   The patient and I had further discussion after their successful supervised weight loss, medical, dietary, and psychiatric clearance. Preoperative upper GI/EGD reviewed. The patient elects to proceed with gastric bypass. We have reviewed the bariatric risk calculator and they understand the risks of surgery for their individual risk factors.     At this point they are cleared for surgery from my point of view and will be submitted for insurance approval.    Patient will be consented for: gastric bypass. We have discussed the possible complications of bariatric surgery which include but are not limited to failed weight loss, weight regain, malnutrition, leak, bleed, stricture, gastric ulcer, gastric fistula, gastric bleed, esophageal injury, gallstones, new or worsening gastric reflux, nausea, emesis, internal hernia, abdominal wall hernia, gastric perforation, need for revision / conversion / or reversal, pregnancy complications and loss, intestinal ischemia, post operative skin complications, possible thinning of their hair, bowel obstruction, dumping syndrome, wound infection, blood clots (DVT, mesenteric thrombus, pulmonary embolism), increased addictive tendency, risk of anesthesia, MI, stroke, and death. They expressed complete understanding and had no further questions. The patient understands this is a life altering decision and will require compliance to the program for the remainder of their life in order to be monitored to avoid complication and ensure successful, sustained weight control. They will be placed on a lifelong low carbohydrate and low sugar diet, exercise, and vitamin regimen and will require frequent blood draws and office visits to ensure adequate nutrition and program compliance. Visits and follow up will be in compliance with the guidelines set forth by [facility]. I have specifically mentioned the need to avoid all personal and second-hand tobacco exposure, systemic steroids, and NSAIDS after any bariatric surgery to help avoid the above listed complications. The patient has expressed understanding of the above and would like to proceed with surgery.       Signed By: Brayden Mendoza MD [facility]  Bariatric and General Surgeon  [facility]    December 20, 2022

## 2022-12-20 NOTE — H&P (VIEW-ONLY)
Bariatric Surgery Progress Note    CC: Preop appointment for bariatric surgery  Subjective:     Patient presents for a preop appointment for bariatric surgery having completed the insurance-mandated and clinically-relevant clearances/counseling.      REVIEW:   EGD: reports and photos appear unremarkable, Jagdish Jones MD endoscopist  EGD path: mild/moderate chronic gastritis, no H pylori on stains  UGI report: done at [facility]: Significantly diminished esophageal motility/presbyesophagus, JUWAN without evidence of esophagitis or stricture  Decreased eGFR (47.1, normal >60)  A1c: 7.1  Hgb 11.3  Normal vitamin D  NM GES mildly abnormal with 11% residual activity at 4 hours    Cardiac risk stratification in chart  Psych clearance in chart  PCP clearance in chart  Pulm clearance in chart  Endocrine clearance in chart    Previous relevant surgeries: lap lisa, lap tubal    Patient Active Problem List    Diagnosis Date Noted    Edema 04/19/2022    Chronic pain of both shoulders 07/07/2021    Primary osteoarthritis of both knees 07/07/2021    Pulmonary embolism ([address] 04/17/2021    SOB (shortness of breath) 12/08/2020    Colitis 01/01/2020    Panniculitis 03/18/2019    Type II diabetes mellitus with complication, uncontrolled 11/25/2016    Preseptal cellulitis 29/70/4312    Uncomplicated severe persistent asthma 10/21/2015    Uncontrolled type 2 diabetes mellitus with hyperglycemia ([address] 07/29/2014    Right flank pain 06/24/2014    Diabetes mellitus with renal complications (HCC)     HTN (hypertension)     Hyperlipidemia     CAD (coronary artery disease)     RAD (reactive airway disease)     Diabetic gastroparesis (HCC)     PSVT (paroxysmal supraventricular tachycardia) (HCC)     Sleep apnea     Heel spur     GERD (gastroesophageal reflux disease)     DJD (degenerative joint disease), multiple sites     Morbid obesity ([address]     Vitamin D deficiency     Chronic pain of both knees 10/22/2007      Past Medical History: Diagnosis Date    Asthma     CAD (coronary artery disease) 2005    insignif = 20% les mid LAD    Chronic pain 2005    Colitis     Colitis     Di[address] ([facility]     Diabetes mellitus with renal complications ([facility] 2906    Diabetic gastroparesis ([facility]     DJD (degenerative joint disease), multiple sites     GERD (gastroesophageal reflux disease)     Heel spur 2007    left    HTN (hypertension)     Hyperlipemia     Hyperlipidemia     Hypertension     Liver disease 2005    Morbid obesity ([facility]     Osteoarthritis     PSVT (paroxysmal supraventricular tachycardia) ([facility] 2006    Pulmonary embolism (HCC)     RAD (reactive airway disease)     Sleep apnea 2007    CPAP    Vitamin D deficiency      Past Surgical History:   Procedure Laterality Date    COLONOSCOPY N/A 2/13/2020    DIAGNOSTIC COLONOSCOPY /c Hot Snared Polypectomy performed by Kaia Johnson MD at [address] [place] [address]    HX BREAST REDUCTION      HX CHOLECYSTECTOMY  2018    lap    HX SHOULDER ARTHROSCOPY  2008    left decompression    HX TUBAL LIGATION  1982    lap    MO LEG/ANKLE SURGERY PROC UNLISTED      benign cyst left ankle      Social History     Tobacco Use    Smoking status: Never    Smokeless tobacco: Never   Substance Use Topics    Alcohol use: No      Family History   Problem Relation Age of Onset    Cancer Brother         throat    Hypertension Sister     Diabetes Sister     Diabetes Sister     Hypertension Sister     Diabetes Brother       Prior to Admission medications    Medication Sig Start Date End Date Taking? Authorizing Provider   Gemtesa 75 mg tablet Take 75 mg by mouth daily. 11/2/22  Yes Provider, Historical   promethazine (PHENERGAN) 12.5 mg tablet Take  by mouth every six (6) hours as needed for Nausea. Yes Provider, Historical   dulaglutide (Trulicity) 5.36 EO/0.4 mL sub-q pen as directed.    Yes Provider, Historical   latanoprost (XALATAN) 0.005 % ophthalmic solution  7/12/22  Yes Provider, Historical potassium chloride SR (K-TAB) 20 mEq tablet    Yes Provider, Historical   pantoprazole (PROTONIX) 40 mg tablet Take 40 mg by mouth daily. 2/22/22  Yes Provider, Historical   timolol/dorzolamide/latanop/PF (timoloL-dorzolamid-latanop,PF,) 0.5-2-0.005 % drop  2/22/22  Yes Provider, Historical   hyoscyamine (LEVSIN) 0.125 mg/5 mL elixir  1/1/15  Yes Provider, Historical   insulin lispro (HUMALOG) 100 unit/mL injection by SubCUTAneous route. 45-65U sliding scale   Yes Provider, Historical   apixaban (ELIQUIS) 5 mg tablet Take 5 mg by mouth two (2) times a day. Yes Provider, Historical   insulin glargine U-300 conc 300 unit/mL (3 mL) inpn 150 Units by SubCUTAneous route nightly. Yes Provider, Historical   dicyclomine (BENTYL) 20 mg tablet Take  by mouth two (2) times a day. 1/14/20  Yes Provider, Historical   furosemide (LASIX) 20 mg tablet Take  by mouth daily. 12/23/19  Yes Provider, Historical   celecoxib (CELEBREX) 200 mg capsule Take  by mouth daily. 12/6/19  Yes Provider, Historical   pregabalin (LYRICA) 150 mg capsule Take  by mouth daily. 12/22/19  Yes Provider, Historical   ondansetron (ZOFRAN ODT) 4 mg disintegrating tablet Take 1 Tab by mouth every eight (8) hours as needed for Nausea. 12/28/19  Yes Tal Edmond PA-C   HYDROmorphone (DILAUDID) 4 mg tablet Take 4 mg by mouth every six (6) hours as needed for Pain. Yes Provider, Historical   amLODIPine (NORVASC) 10 mg tablet Take 10 mg by mouth daily. Yes Other, MD Rudolph   montelukast (SINGULAIR) 10 mg tablet Take 10 mg by mouth daily. Yes Other, MD Rudolph   albuterol (PROVENTIL HFA, VENTOLIN HFA, PROAIR HFA) 90 mcg/actuation inhaler Take 2 Puffs by inhalation every four (4) hours as needed for Wheezing. Yes Other, MD Rudolph   Cholecalciferol, Vitamin D3, 50,000 unit cap Take  by mouth every Monday. Yes Provider, Historical   rosuvastatin (CRESTOR) 40 mg tablet Take 40 mg by mouth daily.  NON FORMULARY    Yes Provider, Historical   losartan (COZAAR) 100 mg tablet Take 25 mg by mouth daily. Yes Provider, Historical   isosorbide mononitrate ER (IMDUR) 30 mg tablet Take 1 Tab by mouth daily. 9/9/10  Yes Migel Junior, DO     Allergies   Allergen Reactions    Shellfish Derived Anaphylaxis    Glucophage [Metformin] Diarrhea    Reglan [Metoclopramide] Other (comments)     jittery    Ultram [Tramadol] Other (comments)     No help for DJD pain and upset stomach        Review of Systems:    Constitutional: No fever or chills  Neurologic: No headache  Eyes: No scleral icterus or irritated eyes  Nose: No nasal pain or drainage  Mouth: No oral lesions or sore throat  Cardiac: No palpations or chest pain  Pulmonary: No cough or shortness of breath  Gastrointestinal: No nausea, emesis, diarrhea, or constipation  Genitourinary: No dysuria  Musculoskeletal: No muscle or joint tenderness  Skin: No rashes or lesions  Psychiatric: No anxiety or depressed mood      Objective:     Physical Exam:  Visit Vitals  BP (!) 162/74 (BP 1 Location: Left lower arm)   Pulse 87   Temp 98 °F (36.7 °C)   Resp 16   Ht 5' (1.524 m)   Wt 142.4 kg (314 lb)   SpO2 95%   BMI 61.32 kg/m²     General: No acute distress, conversant  Eyes: PERRLA, no scleral icterus  HENT: Normocephalic without oral lesions  Neck: Trachea midline without LAD  Cardiac: Normal pulse rate and rhythm  Pulmonary: Symmetric chest rise with normal effort  GI: Soft, NT, ND, no hernia, no splenomegaly  Skin: Warm without rash  Extremities: No edema or joint stiffness  Psych: Appropriate mood and affect    Assessment:     Morbid obesity with comorbidities  Plan:   The patient and I had further discussion after their successful supervised weight loss, medical, dietary, and psychiatric clearance. Preoperative upper GI/EGD reviewed. The patient elects to proceed with gastric bypass. We have reviewed the bariatric risk calculator and they understand the risks of surgery for their individual risk factors.     At this point they are cleared for surgery from my point of view and will be submitted for insurance approval.    Patient will be consented for: gastric bypass. We have discussed the possible complications of bariatric surgery which include but are not limited to failed weight loss, weight regain, malnutrition, leak, bleed, stricture, gastric ulcer, gastric fistula, gastric bleed, esophageal injury, gallstones, new or worsening gastric reflux, nausea, emesis, internal hernia, abdominal wall hernia, gastric perforation, need for revision / conversion / or reversal, pregnancy complications and loss, intestinal ischemia, post operative skin complications, possible thinning of their hair, bowel obstruction, dumping syndrome, wound infection, blood clots (DVT, mesenteric thrombus, pulmonary embolism), increased addictive tendency, risk of anesthesia, MI, stroke, and death. They expressed complete understanding and had no further questions. The patient understands this is a life altering decision and will require compliance to the program for the remainder of their life in order to be monitored to avoid complication and ensure successful, sustained weight control. They will be placed on a lifelong low carbohydrate and low sugar diet, exercise, and vitamin regimen and will require frequent blood draws and office visits to ensure adequate nutrition and program compliance. Visits and follow up will be in compliance with the guidelines set forth by [facility]. I have specifically mentioned the need to avoid all personal and second-hand tobacco exposure, systemic steroids, and NSAIDS after any bariatric surgery to help avoid the above listed complications. The patient has expressed understanding of the above and would like to proceed with surgery.       Signed By: Mary Jane Momin MD [facility]  Bariatric and General Surgeon  [address] [place] [address] [facility]    December 20, 2022

## 2022-12-20 NOTE — PROGRESS NOTES
Alka Romero is a 81 y.o. female (: 1950) presenting to address:    Chief Complaint   Patient presents with    Pre-op Exam     Bariatric        Medication list and allergies have been reviewed with Alka Romero and updated as of today's date. I have gone over all Medical, Surgical and Social History with Alka Romero and updated/added the information accordingly. 1. Have you been to the ER, urgent care clinic since your last visit? Hospitalized since your last visit? Yes [facility] Horn for stomach pains. 2. Have you seen or consulted any other health care providers outside of the [address] [place] [address] since your last visit? Include any pap smears or colon screening.  No

## 2022-12-30 RX ORDER — ERYTHROMYCIN 333 MG/1
333 TABLET, DELAYED RELEASE ORAL 2 TIMES DAILY
COMMUNITY
Start: 2022-11-10 | End: 2023-01-12

## 2022-12-30 NOTE — PERIOP NOTES
JAVY Bryant phone assessment completed on 12/30/2022. The following instructions were reviewed with Jonatan Mancini and patient verbalized understanding. Do NOT eat or drink anything, including candy, gum, or ice chips after midnight on 01/11/2023, unless you have specific instructions from your surgeon or anesthesia provider to do so. You may brush your teeth before coming to the hospital.  No smoking 24 hours prior to the day of surgery. No alcohol 24 hours prior to the day of surgery. No recreational drugs for one week prior to the day of surgery. Leave all valuables, including money/purse, at home. Remove all jewelry, nail polish, acrylic nails, and makeup (including mascara); no lotions powders, deodorant, or perfume/cologne/after shave on the skin. Glasses/contact lenses and dentures may be worn to the hospital.  They will be removed prior to surgery. Call your doctor if symptoms of a cold or illness develop within 24-48 hours prior to your surgery. 10.  AN ADULT MUST DRIVE YOU HOME AFTER OUTPATIENT SURGERY. 11.  If you are having an outpatient procedure, please make arrangements for a responsible adult to be with you for 24 hours after your surgery. Special Instructions:      Bring list of CURRENT medications. Bring inhaler. Bring CPAP machine. Bring any pertinent legal medical records. Take these medications the morning of surgery with a sip of water per surgeon  Follow physician instructions about insulin. Follow physician instructions about stopping anticoagulants,(Angelica)  Complete bowel prep per MD instructions.

## 2023-01-02 ENCOUNTER — DOCUMENTATION ONLY (OUTPATIENT)
Dept: BARIATRICS/WEIGHT MGMT | Age: 73
End: 2023-01-02

## 2023-01-03 ENCOUNTER — HOSPITAL ENCOUNTER (OUTPATIENT)
Dept: BARIATRICS/WEIGHT MGMT | Age: 73
Discharge: HOME | End: 2023-01-03

## 2023-01-03 ENCOUNTER — TELEPHONE (OUTPATIENT)
Dept: BARIATRICS/WEIGHT MGMT | Age: 73
End: 2023-01-03

## 2023-01-03 RX ORDER — ENOXAPARIN SODIUM 100 MG/ML
40 INJECTION SUBCUTANEOUS DAILY
Qty: 30 EACH | Refills: 0 | Status: SHIPPED | OUTPATIENT
Start: 2023-01-11 | End: 2023-02-10

## 2023-01-03 NOTE — PROGRESS NOTES
CLINICAL NUTRITION PRE-OPERATIVE EDUCATION    Patient's Name: Tosha Jain   Age: 81 y.o. YOB: 1950   Sex: female    Education & Materials Provided:   - Soft Protein Diet Shopping List  -  Supplemental Resource Guide: MVI, B12, Calcium Citrate, Vitamin D, Vitamin B1,   and iron recommendations  - Protein Supplement Information  - Fluid Requirements/ No Straws  - No Caffeine or Carbonation   - No alcohol              - No Snacks or No Concentrated Sweets     - Exercising   - Support System at [facility] of Support Group meetings. Support System after surgery includes: x     - Key Diet Principles            - Addressed Current Habits/Changes to Make   - Patient has been educated on the liquid diet to begin 1 week prior to surgery. Patient understands the transition of the diet. Attendance of support group: Yes    Summary:  Patient has completed the required amount of visits with the Registered Dietitian. During these nutrition visits, we focused on dietary changes, behavior changes, and the importance of establishing an exercise routine. The diet protocol that patient was prescribed emphasized on low carbohydrates (less than 100 grams per day prior to surgery and 60-80 grams of protein per day). At today's session, patient was educated on the post-op diet protocol. Patient understands the importance of keeping total fat and sugar less than 3 grams per serving. Patient is aware of the transition of the diet stages and is aware that they will be on clear liquids for 7days, followed by soft protein for 5 weeks. Patient understands the body needs ~ 60-70 grams of protein per day. During the liquid phase, patient will need 60 grams of protein from shakes. Once eating soft protein, patient will only need 1 shake per day. Patient is aware of the importance of the vitamins and protein drinks. We spent a lot of time talking about the vitamins.   Patient understands the importance of being compliant with the diet protocol and the complications and risks that can occur if they are non-compliant with the nutritional protocol and non-compliant with the vitamins. Patient has also been educated on the pre-op liquid diet for 10 days. Patient understands that failure to comply in pre-op liquid diet could result in surgery being canceled. Patient's 6 week post-op nutrition appointment has been scheduled. At this 6 week visit, RD will assess how patient is tolerating soft protein and advance to vegetables, if tolerating soft protein without difficulty. Patient will also see RD again at 9 months post-op. This visit will assess patient's compliance with current protocol, including diet, vitamins, protein shakes, and exercise. Post-op diet guidelines will be reinforced. RD is available for questions and to meet with patient outside of the 6 week and 9 month post-op visit. Ok to proceed.      Candidate for surgery: Yes  Re-evaluation Date:     Irma Godinez 87 RD  1/2/2023

## 2023-01-03 NOTE — TELEPHONE ENCOUNTER
Gastric Bypass Instruct patient to read and understand how their surgery works. The laparoscopic Mable-en-Y Gastric Bypass -- often called gastric  bypass -- is considered the gold standard of weight loss surgery. The procedure: The gastric bypass is one of the most frequently performed weight  loss procedures in the [place]. In this procedure, stapling  creates a small (15 to 20 milliliters) stomach pouch. The remainder  of the stomach is not removed but is completely stapled shut and divided from the stomach  pouch. The outlet from this newly formed pouch empties directly into the lower portion of the  small intestine, thus bypassing calorie absorption. This is done by dividing the small intestine just  beyond the duodenum for the purpose of bringing it up and constructing a connection with the  newly formed stomach pouch. The other end is connected into the side of the Mable limb of the  intestine creating the Y shape that gives the technique its name. The length of either segment of  the intestine can be increased to produce lower or higher levels of malabsorption. Most importantly, the rerouting of the food stream produces changes in gut hormones that  promote feeling of fullness, suppress hunger, and reverse one of the primary mechanisms by which  obesity induces Type 2 diabetes. Advantages: The average excess weight loss after the gastric bypass procedure is generally higher in a  compliant patient than with purely restrictive procedures. One year after surgery, weight loss can average 60 to 80 percent of excess body weight. Studies show that after 10 to 14 years, 50 to 60 percent of excess body weight loss has been  maintained by some patients. A 2000 study of 500 patients showed that 96 percent of associated health conditions (back  pain, sleep apnea, high blood pressure, diabetes and depression) were improved or resolved.   Disadvantages:   Because the duodenum is bypassed, poor absorption of iron and calcium can result in the  lowering of total body iron and a predisposition to iron deficiency anemia. A chronic anemia caused by vitamin B-12 deficiency may occur. This problem usually can be  prevented with vitamin B-12 pills under the tongue or injections. In some cases, the effectiveness of the procedure may be reduced if the stomach pouch is  stretched and/or if it is initially left larger than 15 to 30 cc. The bypassed portion of the stomach, duodenum and segments of the small intestine cannot  be easily visualized using X-ray or endoscopy if there are any problems, such as ulcers,  bleeding or malignancy. Sleeve Gastrectomy  The laparoscopic sleeve gastrectomy -- often called the  sleeve -- is performed by removing approximately 80 percent  of the stomach. The remaining stomach is a tubular pouch that  resembles a banana. The procedure:  During the sleeve gastrectomy procedure, a thin, vertical sleeve  of stomach is created by using a stapling device. The sleeve is about the size of a banana. The rest  of the stomach is removed. By creating a smaller stomach pouch, a sleeve gastrectomy limits the  amount of food that can be eaten at one time so you feel full sooner and stay full longer. As you  eat less food, your body will stop storing excess calories and start using its fat supply for energy. The greater impact, however, seems to be the effect the surgery has on gut hormones that impact  a number of factors including hunger, feeling of fullness, and blood sugar control. Advantages:   Eliminates the portion of the stomach that produces the hormones that stimulate hunger. Minimizes the chance of an ulcer occurring. Is an appealing option for people who are concerned about the complications of intestinal  bypass procedures or who have anemia, Crohns disease or various other conditions that make  intestinal bypass procedures too risky.   -- 13 --  PATIENT GUIDE TO BARIATRIC [facility]  Disadvantages:   Potential for inadequate weight loss or weight regain. While this is true for all procedures, it is  more possible with procedures that do not have an intestinal bypass. Higher BMI patients may need to have a second-stage procedure later to help lose additional  weight. Remember, two stages may ultimately be safer and more effective than one operation  for higher BMI patients. This procedure does involve stomach stapling and therefore, leaks and other complications  related to stapling may occur. This procedure is not reversible. Pre op Appoitments  PE LOCATION PROVIDER  2 Weeks  6-Week Nutrition  3 Months*  6 Months*  1 Year*  Patient Acknowledgement of Risks, Benefits,  and Alternatives to Bariatric Surgical Procedures  Patient Name:_________________________________________________________________  :_ _______________________________________________________________________  I am requesting bariatric surgery be performed on me. I believe I may benefit from bariatric  surgery. This form is designed to ensure that I understand the risks, benefits, and alternatives to  having bariatric surgery. Bariatric surgery, or surgery for morbid obesity, is major surgery. The  options available include restrictive procedures, or those that limit digestive capacity. Examples  include vertical sleeve gastrectomy, or the adjustable gastric band (Lap-Band¢ç/Realize). Malabsorptive procedures, such as distal gastric bypass produce weight loss by decreasing nutrient  absorption. Biliopancreatic diversion with duodenal switch (BPD/DS) and Mable-en-Y gastric  bypass combine these two processes to varying degrees. While most procedures can be performed  laparoscopically (through multiple small incisions), there is a possibility that an open technique  may be required (through a large incision).  There are alternatives to surgery including medications,  diet and exercise, and behavior modification. I understand that obesity is a chronic disease with no  known cure. I am choosing bariatric surgery as treatment for this disease. Patient initials: ______________  I am informed of the potential benefits from bariatric surgery which may include improvements  in associated comorbidities (ie; diabetes, obstructive sleep apnea, GERD, high blood pressure),  potential weight loss of 50-80 percent excess weight, and general improvement in quality of life. The benefits are not guaranteed, and are dependent upon me making the necessary lifestyle  changes for success. I am aware that some patients may not lose as much weight, or still may  require treatment for medical problems after bariatric surgery. Some patients may gain back some  or all of the weight lost after bariatric surgery. Bariatric surgery is a treatment tool, not a cure for  obesity. Compliance with the dietary and lifestyle recommendations is necessary for maintenance  of lost weight in the long term. For example, I have been taught that it is recommended that  all patients maintain a healthy diet consisting of low-carbohydrate, low-sugar foods rich in lean  protein, and non-starchy vegetables. Regular exercise including aerobic activity and weight  training is encouraged, as well as regular attendance at support group meetings. Patient initials: ______________  -- 23 --  PATIENT GUIDE TO Jayden Holcomb  Eliminating habits that could be detrimental to my health such as drinking alcohol or smoking  is required for all patients. I am aware that the risks of smoking and alcohol use after bariatric  surgery include anesthesia complications, stomach ulcers, liver diseases and malnutrition.   In addition, research has shown an increase in sensitivity to alcohol particularly after gastric bypass  procedures resulting in rapid increases in blood alcohol levels and possible addiction. Patient initials: ______________  Because bariatric surgery is considered major surgery, there are many potential complications that  could arise. Some of the problems are related to the bariatric procedure itself, while others are  related to anesthesia and operating on the abdomen. I understand the serious potential complications include: deep venous thrombosis/pulmonary  embolism (blood clots in the legs and or lungs); gastrointestinal leak (leakage of digestive contents  into the abdomen); sepsis (serious infection); injury to adjacent organs such as esophagus, spleen,  pancreas, liver, diaphragm (requiring intervention or surgical removal); excessive bleeding; bowel  obstruction (blockage of the intestines); or organ failure. I am informed that these complications  can be life threatening. The overall mortality rate (risk of death) from bariatric surgery is close to  0.1 percent (1/1,000), but can be as high as 0.5 percent (1/200) for some patients. Patient initials: ______________  I understand that complications requiring re-operation may occur, either immediately after initial  surgery, or later in the recovery process. Patient initials: ______________  Other potential complications which I may have include: wound infections or seromas  (fluid collection under skin); hernias (breakdown of tissue holding in abdominal contents);  gastritis or stomach ulcer (inflammation of the stomach); formation of gallstones; formation  of kidney stones or urinary tract infection; or pneumonia. Device related complications such as  foreign body reaction, band slippage, or erosion may occur with the adjustable gastric band  (Lap-Band¢ç/Realize). Patient initials: ______________  -- 21 --  PATIENT GUIDE TO Jayden Holcomb  I may struggle with food intolerances after bariatric surgery. These may include sugars, fats, and  lactose (milk sugar).  My taste for certain foods may change as well. Dumping Syndrome (reaction  caused by sugar rapidly entering the intestine -- symptoms include: nausea, sweating, weakness,  dizziness, flushing, possible vomiting and/or diarrhea) occurs often after bariatric surgery. Vomiting and changes in bowel habits may occur, and may be the result of eating too fast, taking  too large a bite, inappropriate food choice or not chewing properly. Chronic vomiting may be a  result of stenosis (tightening) in the stomach pouch and intestine, and may require that I have  treatment with endoscopy or surgery. Malabsorption may lead to diarrhea, foul smelling gas and  protein malnutrition. Though rarely, I may require feedings through tubes into the digestive tract  or veins for nourishment. I am aware that in some patients hair loss or thinning may occur in the  rapid weight loss phase. This is usually temporary, but can be permanent in some cases. I have  been taught about the importance of proper hydration after bariatric surgery, and understand that  dehydration is the most common reason for re-admission to the hospital after surgery. Patient initials: ______________  I understand that over time, and especially with forced overeating, the stomach pouch may stretch  (dilate) or the staple lines may break. This can result in weight regain, ulcer formation or both. Patient initials: ______________  As a female undergoing bariatric surgery, I acknowledge that I must prevent pregnancies for at  least 12 to 18 months following surgery. Pregnancy during the rapid weight loss phase can be  dangerous and harmful to both the mother and fetus. Patient initials: ______________  Vitamin and mineral deficiencies can occur after bariatric surgery. I am instructed to take vitamin  and mineral supplements daily for life (including multivitamin, iron, B vitamins, calcium and vitamin  D).  Failure to comply with these recommendations could result in my experiencing weakness,  nerve or brain damage, confusion, fatigue, rashes, anemia, hair loss, bone loss, and mood changes. I agree to have lab work at regular intervals to assess for vitamin deficiencies. I understand that it  is imperative that I receive continued follow up care after my bariatric surgery by my surgeon, my  program, or other clinician experienced in bariatric care. Patient initials: ______________  -- 21 --  PATIENT GUIDE TO Jayden Holcomb  If I have an adjustable gastric band (Lap-Band¢ç/Realize), needle adjustments (addition/removal  of saline solution) are required for weight loss and to maintain weight loss. Patient initials: ______________  After I lose weight, the skin of my arms, legs, abdomen, neck, and face may become wrinkled,  droop or sag. Rashes and infections may occur in between my skin folds. Cosmetic surgery may  be indicated in some cases. This is not considered medically necessary in most cases and is rarely  covered by insurance. Patient initials: ______________  I understand that psychological changes may occur with weight loss as a result of bariatric surgery,  which can affect relationships with my loved ones. I agree to re-engage with a mental health  provider as needed. Patient initials: ______________  Some patients elect to have revisional bariatric surgery (correcting anatomic defects from a  previous bariatric operation). I am aware that in these cases, all of the previously addressed risks  apply, however they are three to five times more common. Patient initials: ______________  Follow-up appointments are vital. I agree to the following schedule of appointments after surgery:  two to three weeks, three months, six months, 12 months, and then annually for life. Additional  appointments may be necessary. Gastric Band patient follow-up is determined by my need for  adjustments but is at least once per year after the first year.   Patient initials: ______________  -- 25 --  PATIENT GUIDE  Patient Acknowledgement of Risks, Benefits,  and Alternatives to Bariatric Surgical Procedures  Patient Name:_________________________________________________________________  :_ _______________________________________________________________________  I am requesting bariatric surgery be performed on me. I believe I may benefit from bariatric  surgery. This form is designed to ensure that I understand the risks, benefits, and alternatives to  having bariatric surgery. Bariatric surgery, or surgery for morbid obesity, is major surgery. The  options available include restrictive procedures, or those that limit digestive capacity. Examples  include vertical sleeve gastrectomy, or the adjustable gastric band (Lap-Band¢ç/Realize). Malabsorptive procedures, such as distal gastric bypass produce weight loss by decreasing nutrient  absorption. Biliopancreatic diversion with duodenal switch (BPD/DS) and Mable-en-Y gastric  bypass combine these two processes to varying degrees. While most procedures can be performed  laparoscopically (through multiple small incisions), there is a possibility that an open technique  may be required (through a large incision). There are alternatives to surgery including medications,  diet and exercise, and behavior modification. I understand that obesity is a chronic disease with no  known cure. I am choosing bariatric surgery as treatment for this disease. Patient initials: ______________  I am informed of the potential benefits from bariatric surgery which may include improvements  in associated comorbidities (ie; diabetes, obstructive sleep apnea, GERD, high blood pressure),  potential weight loss of 50-80 percent excess weight, and general improvement in quality of life. The benefits are not guaranteed, and are dependent upon me making the necessary lifestyle  changes for success.  I am aware that some patients may not lose as much weight, or still may  require treatment for medical problems after bariatric surgery. Some patients may gain back some  or all of the weight lost after bariatric surgery. Bariatric surgery is a treatment tool, not a cure for  obesity. Compliance with the dietary and lifestyle recommendations is necessary for maintenance  of lost weight in the long term. For example, I have been taught that it is recommended that  all patients maintain a healthy diet consisting of low-carbohydrate, low-sugar foods rich in lean  protein, and non-starchy vegetables. Regular exercise including aerobic activity and weight  training is encouraged, as well as regular attendance at support group meetings. Patient initials: ______________  -- 23 --  PATIENT GUIDE TO Jayden Holcomb  Eliminating habits that could be detrimental to my health such as drinking alcohol or smoking  is required for all patients. I am aware that the risks of smoking and alcohol use after bariatric  surgery include anesthesia complications, stomach ulcers, liver diseases and malnutrition. In addition, research has shown an increase in sensitivity to alcohol particularly after gastric bypass  procedures resulting in rapid increases in blood alcohol levels and possible addiction. Patient initials: ______________  Because bariatric surgery is considered major surgery, there are many potential complications that  could arise. Some of the problems are related to the bariatric procedure itself, while others are  related to anesthesia and operating on the abdomen.   I understand the serious potential complications include: deep venous thrombosis/pulmonary  embolism (blood clots in the legs and or lungs); gastrointestinal leak (leakage of digestive contents  into the abdomen); sepsis (serious infection); injury to adjacent organs such as esophagus, spleen,  pancreas, liver, diaphragm (requiring intervention or surgical removal); excessive bleeding; bowel  obstruction (blockage of the intestines); or organ failure. I am informed that these complications  can be life threatening. The overall mortality rate (risk of death) from bariatric surgery is close to  0.1 percent (1/1,000), but can be as high as 0.5 percent (1/200) for some patients. Patient initials: ______________  I understand that complications requiring re-operation may occur, either immediately after initial  surgery, or later in the recovery process. Patient initials: ______________  Other potential complications which I may have include: wound infections or seromas  (fluid collection under skin); hernias (breakdown of tissue holding in abdominal contents);  gastritis or stomach ulcer (inflammation of the stomach); formation of gallstones; formation  of kidney stones or urinary tract infection; or pneumonia. Device related complications such as  foreign body reaction, band slippage, or erosion may occur with the adjustable gastric band  (Lap-Band¢ç/Realize). Patient initials: ______________  -- 21 --  PATIENT GUIDE TO Jayden Holcomb  I may struggle with food intolerances after bariatric surgery. These may include sugars, fats, and  lactose (milk sugar). My taste for certain foods may change as well. Dumping Syndrome (reaction  caused by sugar rapidly entering the intestine -- symptoms include: nausea, sweating, weakness,  dizziness, flushing, possible vomiting and/or diarrhea) occurs often after bariatric surgery. Vomiting and changes in bowel habits may occur, and may be the result of eating too fast, taking  too large a bite, inappropriate food choice or not chewing properly. Chronic vomiting may be a  result of stenosis (tightening) in the stomach pouch and intestine, and may require that I have  treatment with endoscopy or surgery. Malabsorption may lead to diarrhea, foul smelling gas and  protein malnutrition.  Though rarely, I may require feedings through tubes into the digestive tract  or veins for nourishment. I am aware that in some patients hair loss or thinning may occur in the  rapid weight loss phase. This is usually temporary, but can be permanent in some cases. I have  been taught about the importance of proper hydration after bariatric surgery, and understand that  dehydration is the most common reason for re-admission to the hospital after surgery. Patient initials: ______________  I understand that over time, and especially with forced overeating, the stomach pouch may stretch  (dilate) or the staple lines may break. This can result in weight regain, ulcer formation or both. Patient initials: ______________  As a female undergoing bariatric surgery, I acknowledge that I must prevent pregnancies for at  least 12 to 18 months following surgery. Pregnancy during the rapid weight loss phase can be  dangerous and harmful to both the mother and fetus. Patient initials: ______________  Vitamin and mineral deficiencies can occur after bariatric surgery. I am instructed to take vitamin  and mineral supplements daily for life (including multivitamin, iron, B vitamins, calcium and vitamin  D). Failure to comply with these recommendations could result in my experiencing weakness,  nerve or brain damage, confusion, fatigue, rashes, anemia, hair loss, bone loss, and mood changes. I agree to have lab work at regular intervals to assess for vitamin deficiencies. I understand that it  is imperative that I receive continued follow up care after my bariatric surgery by my surgeon, my  program, or other clinician experienced in bariatric care. If I have an adjustable gastric band (Lap-Band¢ç/Realize), needle adjustments (addition/removal  of saline solution) are required for weight loss and to maintain weight loss.   Patient initials: ______________  After I lose weight, the skin of my arms, legs, abdomen, neck, and face may become wrinkled,  droop or sag. Rashes and infections may occur in between my skin folds. Cosmetic surgery may  be indicated in some cases. This is not considered medically necessary in most cases and is rarely  covered by insurance. Patient initials: ______________  I understand that psychological changes may occur with weight loss as a result of bariatric surgery,  which can affect relationships with my loved ones. I agree to re-engage with a mental health  provider as needed. Patient initials: ______________  Some patients elect to have revisional bariatric surgery (correcting anatomic defects from a  previous bariatric operation). I am aware that in these cases, all of the previously addressed risks  apply, however they are three to five times more common. Patient initials: ______________  Follow-up appointments are vital. I agree to the following schedule of appointments after surgery:  two to three weeks, three months, six months, 12 months, and then annually for life. Additional  appointments may be necessary. Gastric Band patient follow-up is determined by my need for  adjustments but is at least once per year after the first year  Diet Quick Review  7-Day Preoperative Liquid Diet  Benefits:   Reducing intake before surgery will shrink  your liver by depleting glycogen (a form of  stored energy). Reduced liver size gives better access to  stomach during surgery, which translates  to a safer surgery. Prevents the last supper syndrome. Experiencing weight loss before the  procedure encourages postoperative  compliance and jump-starts weight loss. Specifics:   Start seven days before surgery:  ____________________. NO SOLID FOODS!! Your surgery will be CANCELED if this  diet is not followed!!! Minimum of 64 ounces of fluid daily,  including protein drinks. No added sugar or carbonated beverages.    Continue to take all your prescribed  medication and your vitamin supplements  during this preoperative diet phase. Clear liquids:   Water. Sugar free, non-carbonated beverages  (crystal light, propel). Sugar free popsicles. Sugar free Jell-O. Fat free, reduced sodium broth . Decaffeinated coffee or decaffeinated tea  with artificial sweeteners. Protein:   60 grams of protein daily  (in liquid supplements). Pre-made protein drinks. Protein powder added to water. 3 gram rule -- limit sugar and fat to less  than 3 grams per 8 ounces. 4 to 6 ounces of low fat/low sugar yogurt  OR cottage cheese three to four times  during the week. [facility] Gastric Bypass and Sleeve Dietary Progression Quick Review     Date of Surgery: _      __________Clear liquid diet. Begin bariatric clear liquid diet on: ______________________________________________   64 ounces of fluid per day. Low calorie, low sugar, non-carbonated beverages:  -- Water, Crystal Light, Propel Water, Sugar Free Jell-O, Sugar Free Popsicles, bouillon. -- Start protein supplement during this stage (60 to 70 grams per day). -- Start all vitamin supplements during this stage (see pages 57-58). -- Getting your fluid in and staying hydrated is your number one priority! The clear liquid diet will last for seven days. ____________________________________________________     t. Begin bariatric soft and moist diet on: _____________________________________________   This stage of the diet will last for five weeks, unless otherwise instructed by your surgeon. Begin -- one week after surgery. End -- six weeks after surgery (or when you follow up with the registered dietitian). Soft, moist, high protein foods -- three meals per day plus protein supplements. -- Portions should emphasize on soft protein. -- Portions will be a MAXIMUM of 1 ounce of solid food and 2 to 3 ounces of cottage cheese and yogurt. -- Protein supplements should be between meals and provide 30 to 40 grams per day during  soft protein diet.   -- Continue to get 64 ounces of fluid in per day. Protein foods that are OK (SLOW TRANSITION) on the soft and moist diet:  -- First week on soft protein should focus on yogurt, cottage cheese, eggs, VEGETARIAN refried  beans, black beans, kidney beans and white beans. (NO BAKED BEANS.)  -- Second through fourth weeks should focus on yogurt, cottage cheese, eggs, canned tuna,  canned chicken, tilapia and fish (needs to be soft enough to be cut up with a fork). -- Fifth week on soft protein diet should focus on yogurt, cottage cheese, eggs, canned tuna,  canned chicken, tilapia, fish, salmon, chicken breast or turkey. Remember to continue to get 64 ounces of fluid daily on ALL stages. To be advanced to bariatric maintenance stage of the bariatric diet, follow up with the dietitian  six weeks after surgery, around:   ___________________________________________________  After having a sleeve gastrectomy I will not be able to take NSAIDs  (non-steroidal anti-inflammatory drugs) for _________ weeks. 15. After having a gastric bypass I will not be able to take NSAIDs  (non-steroidal anti-inflammatory drugs) for _____________ weeks     Please discuss all of your current medications with your surgeon at your preoperative appointment. Your surgeon will inform you regarding which medications to stop before surgery and which  medications you are to take the morning of surgery. Medications to Stop  To minimize the risk of blood loss during surgery,  you must avoid or stop taking medicines that  contain anti-inflammatories, blood thinners, arthritis  medications, and herbal supplements seven to 14 days  before your surgery. A nurse from pre-anesthesia  testing will review your list of medication. Your current  medications will be reviewed at your preoperative  appointment with your surgeon. Note: You may take prescribed narcotics, such as  Vicodin, Ultram and Neurontin.   Diabetic Medications  Adjustments in your diabetic medications will be discussed with your surgeon at your  preoperative appointment. Birth Control  In order to decrease your chance of getting a postoperative blood clot you will be required to stop  any oral contraceptive two weeks before your surgery date. During this time it is your responsibility  to use an effective form of birth control. You will be required to take a pregnancy test the morning  of surgery at the hospital and surgery will be canceled if you are pregnant. We strongly encourage  that you resume your birth control two weeks after surgery or after your first menstrual cycle  following surgery. Do NOT start any new medications within a month of surgery without  discussing it with your surgeon and/or bariatric team first.  Non-Steroidal Anti-Inflammatory Drugs (NSAIDs)  Bypass:  One class of medications to avoid after Mable-en-Y gastric  bypass is NSAIDs. These can cause ulcers or stomach irritation  and are linked to a kind of ulcer called a marginal ulcer after  gastric bypass. Marginal ulcers can bleed or perforate. Usually  they are not fatal, but they can cause months or years of pain,  and are a common cause of re-operation and reversal of gastric  bypass. You will NEVER be able to take NSAIDs again. Your only choice for over the counter pain medication will be  Tylenol (acetaminophen). Steroid use can also be harmful to your stomach but may be necessary in some situations. Please consult with your bariatric surgeon and prescribing physician for approval.  Sleeve gastrectomy:  Following a sleeve gastrectomy you will not be allowed to take NSAIDs unless it has been  discussed and approved by your surgeon. Most commonly taken NSAIDs to be AVOIDED!! 1. Ibuprofen  2. Advil  3. Motrin  4. Excedrin  5. Aspirin  6. Celebrex  7. Naproxen  8. Aleve  9. Voltaren  10. Mobic     ___Pregnancy  It is in your best interest to avoid pregnancy for  12 to 18 months after surgery.  Pregnancy during  the rapid weight loss phase can be dangerous  and harmful to both mother and fetus. Do you have an effective method of birth  control? Please consult with your OB/GYN or  PCP for consultation. Alcohol  Alcohol is not recommended after bariatric  surgery. Alcohol contains calories but minimal  nutrition and will work against your weight  loss goal. For example, wine contains twice  the calories per ounce that regular soda does. The absorption of alcohol changes with gastric  bypass and gastric sleeve because an enzyme  in the stomach which usually begins to digest  alcohol is absent or greatly reduced making  alcohol more potent. Alcohol may also be absorbed more quickly  into the body after gastric bypass or gastric  sleeve. The absorbed alcohol will be more  potent, and studies have demonstrated  that obesity surgery patients reach a higher  alcohol level and maintain the higher levels for  a longer period than others. In some patients  alcohol use can increase and lead to alcohol  dependence or addiction. For all of these  reasons, it is recommended to avoid alcohol  after bariatric surgery. ___________________________________________  Smoking  It is required that ALL patients stop smoking  (including e-cigarettes and marijuana) and  chewing tobacco three months before  surgery. Prior to surgery your surgeon will  order a test to verify that you have quit. Your  surgery will be canceled if you are smoking. Smoking or chewing tobacco leads to  decreased blood supply to your bodys tissues  and delays healing. Smoking harms every  organ in the body and has been linked to:   Blood clots (the leading cause of death after  bariatric surgery). Marginal ulcers after gastric bypass. Heart disease. Stroke. Chronic obstructive pulmonary  (lung) disease. Increased risk for hip fracture. Cataracts.    Cancer of the mouth, throat, esophagus,  larynx (voice box), stomach, pancreas,  bladder, cervix, and kidney. Packing For the [address] your suitcase for the hospital a day or two before your surgery. Anti-skid socks will be provided. Items to Include in Your Bag   Clothing such as short gowns, short pajamas,  shorts, tops, loose-fitting shorts, bathrobe,  capris, etc.   Tennis shoes or flat runner sole shoes that tie. Toiletries. Waterless hand . Eyeglasses, contact lenses and denture cases. A list of medications you are currently taking,  including frequency and dosages. Magazines, books, needle work, crossword  puzzles, etc.   A method of payment to pay for prescriptions. What Not to Bring to the [address] wallet or purse. Jewelry or other valuables. Open-toe slippers or shoes without backs. Countdown to Surgery  14 to 30 Days   Attend a preoperative class with the  dietitian and bariatric coordinator. Pre-admission testing will contact you. Schedule your preoperative appointment  with your surgeon. 10 to 14 Days   Stop taking medications as instructed by  your physician. Seven Days   Start liquid diet. Stop all NSAIDS/aspirin. Three Days Before Surgery   Begin CHG skin prep. Day Before Surgery   Pack for the hospital.   Surgical time will be provided via phone call. YOU MAY NOT HAVE ANYTHING TO EAT  OR DRINK AFTER MIDNIGHT ON THE DAY  BEFORE YOUR SURGERY. This includes gum,  mints, water, etc. You may brush your teeth  the morning of surgery but do not swallow  the water. Simply rinse your mouth out. Day of Surgery   Take medications and brush your teeth  with a sip (1 teaspoon) of water (only the  medications you have been instructed to  take by your surgeon). Remember to report two hours before your  surgery time.  This will give the nursing staff  sufficient time to start IVs, prep you for  surgery and answer questions  If instructed by your surgeon to use sliding scale insulin   o Use regular insulin (Novolog Pen) according to the following insulin sliding scale:  BLOOD SUGAR: AMOUNT OF INSULIN:  Under 150 no insulin  150-200 2 units  201-250 4 units  251-300 6 units  301-350 8 units  351-400 10 units  400 or greater 12 units and call physician     Things to do following the preoperative class:  ? Thoroughly read this binder before surgery. Things to do before surgery:  ? Start the preoperative liquid diet on: _____________________________________________  ? Stop all NSAIDS (see page 30) and aspirin seven days before my surgery: _________________ .  Katie Livers my doctor(PCP or surgeon) regarding stopping Coumadin, Plavix or  other blood thinners. ? Purchase bariatric clear liquids (Crystal Lite, sugar free Jell-O, broth, sugar free popsicles,  protein supplement) and bariatric soft and moist foods (low fat yogurt, cottage cheese, eggs,  tuna, fish, chicken) so that Im prepared when I get home from the hospital.  ? Purchase all vitamins that will be required following surgery. o Chewable multivitamin -- two per day (ex: Flintstones Complete). o Calcium Citrate -- 1,500 milligrams (500 milligrams, three times a day). o Vitamin B-12 -- 1,000 micrograms daily. o Vitamin D3 -- 5,000 IU daily. Vitamin B1 100mg daily  ? Create a system to keep track of how many ounces of fluid I am drinking daily  o Postoperative GOAL = minimum of 64 ounces per day. ? Purchase a protein supplement that I like.  o Brand: _ _________________________________________________________________ .  o Ounces: _________________________________________________________________ .  Jearline Petties of protein per serving: _________________________________________________ . -- 28 --  PATIENT GUIDE TO Jayden Clay 950  6. YOUR SURGERY  Day of Surgery  Before You Leave For the Hospital   Brush your teeth -- upon awakening, you may brush your teeth and rinse with water, but do not  swallow the water.    Take medication -- take only the medications as instructed by your provider with a small sip of  water as soon as you get up. Wear proper clothing that is loose-fitting and easily removed. Avoid back zippers and pantyhose. Please remove ALL jewelry (leave ALL jewelry and valuables at home). Avoid using perfumes, deodorant, shaving creams or any scented lotions. Bring a case with your name on it to hold your eyeglasses, contact lenses, hearing aids  and dentures. Reporting to the [address] [place] [address] will be asked to arrive approximately two  hours before your scheduled surgery. It is important  that you arrive on time to the hospital to avoid any  problems with starting your surgery on time. In some  cases lateness could result in moving your surgery to  a much later time. Your physicians office will provide  your time of arrival to the hospital.  Where Do You Report the Day of Surgery? [place]: [id number] , [place], [address]. Enter through the main entrance. Turn left just past the information desk into the  Registration Office. You will check in for surgery there. You may designate one person to be contacted when your surgery has been completed. -- 36 --  [facility]  Before Surgery  Preoperative     Preoperative Preparation Area  Once you arrive at the hospital you will be escorted to the preoperative preparation area. During the preoperative phase, a nurse will review your medical records and conduct a brief  physical examination to include vital signs (i.e., blood pressure, pulse, temperature, respirations or  breathing). An intravenous tube will be inserted with IV fluids. Your skin will be cleansed with CHG  wipes. If you wear dentures, eyeglasses or contact lenses you will need to remove them at this time.   You will see your surgeon, your anesthesiologist and meet the members of your surgical team.  Medications, such as antibiotics, may be given by anesthetists to decrease your infection risk. Other medications may be given to allay any anxiety you may have. You are allowed to have two family members or friends in the preoperative area before surgery. Going into Surgery  The operating room team will escort you into the operating room where your abdomen will be  prepared for surgery. There will be a time out -- a verification of the correct operative site for  patient safety purposes -- performed. Once in the operating room, monitoring devices, such  as a blood pressure cuff, heart monitor and oxygen monitor, will be attached. You will be given  supplemental oxygen as you are readied for anesthesia. The average length of time for a laparoscopic sleeve gastrectomy is 60 to 90 minutes. The average length of time for a Mable-en-Y gastric bypass is two to two and a half hours. In the Recovery Area  After your surgery is completed, you will be wheeled into the recovery room. In the recovery room:   Nurses will frequently check your vital signs (i.e., blood pressure, pulse, breathing). Nurses will medicate you for your pain as needed through the IV line. Nurses will encourage you to take deep breaths and to move your ankles and feet. Please inform your family the length of time in the recoveryYou will move to your hospital room from the recovery area when you are ready. Your post-surgical  recovery begins here. room will kayla  What to Expect During [address]  From the recovery room, you will be transferred to your hospital room on the bariatric unit  known as 2 Surgical. The private rooms are spacious with large windows and beautiful views. The staff is specially trained in caring for bariatric patients and are extremely friendly and  knowledgeable. We look forward to caring for you during your hospital stay. IV -- IV fluids will be given to help nourish your body after surgery. You will also be given IV pain  medication.  IV fluids will continue until you are discharged from the hospital.  Heart rate monitor (telemetry) -- A heart rate monitor will be worn only in the recovery room  unless otherwise indicated. Tse catheter -- A Tse catheter will be placed in your bladder while you are in the OR and  removed when you arrive to the recovery room. Nasal cannula -- Oxygen will be worn in the nose the night of surgery. Anticoagulation -- A blood thinner may be administered to you to prevent blood clots. Lovenox,  an injectable medication will be used. Drainage tube -- A drainage tube may be inserted into your abdomen during surgery. This tube  collects bloody drainage after surgery. This may be removed prior to discharge from the hospital or  you may be discharged with the drain and it will be removed by your surgeon at your postoperative  visit. If you are discharged with a drain you will be taught how to empty it and care for it. After Surgery   If you do not see your providers clean their hands, please ask them to do so. Family and friends who visit you should not touch the surgical wound or dressings. Family and friends should clean their hands with soap and water or an alcohol-based hand  rub before and after visiting you. If you do not see them clean their hands, ask them to clean  their hands. Make sure you understand how to care for your incision before you leave the hospital.   Always clean your hands before and after caring for your incision. Make sure you know who to contact if you have questions or problems after you get home. If you have any symptoms of an infection, such as redness and pain at the surgery site, drainage,  or fever, call your doctor immediately. Ask your nursing staff to help you out of bed to walk within five hours of arriving at your  hospital room. Getting out of bed to walk helps to decrease complications, such as blood clots  and pneumonia.    of Stay  Yl be required to stay in the hospital for at least ONE night, possibly TWO. Lngth of Stay  Iris Alexandrea be required to stay in the hospital for at least ONE night, possibly TWO.  edications and Pain Control Options  There are many types of pain control methods that are available to control discomfort. Effective  pain control will allow you to be up and walking shortly after surgery. Your doctor will choose  the method that is right for you. Regardless of the method of pain medication being used, it is  important for you to communicate with your nurse if the pain medication is not enough. Call your  nurse for pain medication when the pain is moderate instead of waiting for when pain is severe. What type of pain should I expect? Abdominal pain   Rib pain   Shoulder pain   Brick feeling in the center of your chest  Nausea:  Nausea following bariatric surgery is very  common. Causes include: Anesthesia   Drinking too fast   Pain medication   Surgery itself           Length of Stay  You willExpectations on your Day of Surgery   You will be allowed 1 to 2 ounces of ice chips per hour when you are admitted to the floor. They are solely for your comfort. They are not required. You will be expected to walk the night of your surgery. Please ask your nurse or nursing assistant  for help the first time you walk. Please do not walk if you still feel groggy or unsteady on your feet. Your pain will be controlled with oral and IV pain medication on the day of surgery. In order to prevent pneumonia after surgery you please use your incentive spirometer (ICS)  at least 10 times per hour (see below). be [email or web address]  Bariatric Clear Liquid Diet  You will be started on the bariatric clear liquid diet the morning after your surgery. Your GOAL  will be to drink 4 ounces per hour (SLOW and STEADY) of the following liquids: water, crystal lite,  Jell-O, broth and Unjury (protein supplement). Feel free to bring your favorite protein supplement  from home.    Two important requirements when drinking is you must slowly SIP the fluid and you must be  SITTING up. Walking  while in the hospital you are expected to walk EVERY hour in the hallway. During your hospital  stay you will also be expected to sit in the recliner throughout the day rather than lie in bed. Pain Medication  The morning after surgery you will continue with oral pain medication ONLY for your pain control. Incentive Spirometer  Continue using your incentive spirometer(ICS) 10 times per hour.

## 2023-01-10 ENCOUNTER — ANESTHESIA EVENT (OUTPATIENT)
Dept: SURGERY | Age: 73
DRG: 620 | End: 2023-01-10
Payer: MEDICARE

## 2023-01-11 ENCOUNTER — HOSPITAL ENCOUNTER (INPATIENT)
Age: 73
LOS: 1 days | Discharge: HOME | DRG: 620 | End: 2023-01-12
Attending: SURGERY | Admitting: SURGERY
Payer: MEDICARE

## 2023-01-11 ENCOUNTER — ANESTHESIA (OUTPATIENT)
Dept: SURGERY | Age: 73
DRG: 620 | End: 2023-01-11
Payer: MEDICARE

## 2023-01-11 PROBLEM — E66.01 MORBID OBESITY DUE TO EXCESS CALORIES (HCC): Status: ACTIVE | Noted: 2023-01-11

## 2023-01-11 LAB
ABO + RH BLD: NORMAL
BLOOD GROUP ANTIBODIES SERPL: NORMAL
GLUCOSE BLD STRIP.AUTO-MCNC: 131 MG/DL (ref 70–110)
GLUCOSE BLD STRIP.AUTO-MCNC: 162 MG/DL (ref 70–110)
HBA1C MFR BLD: 5.8 % (ref 4.2–5.6)
POTASSIUM SERPL-SCNC: 3.2 MMOL/L (ref 3.5–5.5)
SPECIMEN EXP DATE BLD: NORMAL

## 2023-01-11 PROCEDURE — 77030038269 HC DRN EXT URIN PURWCK BARD -A

## 2023-01-11 PROCEDURE — 77030041523 HC SEALNT FIBRN VITASEAL J&J -E: Performed by: SURGERY

## 2023-01-11 PROCEDURE — 77030036732 HC RELD STPLR VASC J&J -F: Performed by: SURGERY

## 2023-01-11 PROCEDURE — 77030031139 HC SUT VCRL2 J&J -A: Performed by: SURGERY

## 2023-01-11 PROCEDURE — 74011250637 HC RX REV CODE- 250/637: Performed by: SURGERY

## 2023-01-11 PROCEDURE — 74011636637 HC RX REV CODE- 636/637: Performed by: NURSE ANESTHETIST, CERTIFIED REGISTERED

## 2023-01-11 PROCEDURE — 82962 GLUCOSE BLOOD TEST: CPT

## 2023-01-11 PROCEDURE — 86900 BLOOD TYPING SEROLOGIC ABO: CPT

## 2023-01-11 PROCEDURE — 77030018836 HC SOL IRR NACL ICUM -A: Performed by: SURGERY

## 2023-01-11 PROCEDURE — 77030008608 HC TRCR ENDOSC SMTH AMR -B: Performed by: SURGERY

## 2023-01-11 PROCEDURE — 77030003578 HC NDL INSUF VERES AMR -B: Performed by: SURGERY

## 2023-01-11 PROCEDURE — 65270000029 HC RM PRIVATE

## 2023-01-11 PROCEDURE — 77030008437 HC REINF STRP REINF SEMGD WLGO -C: Performed by: SURGERY

## 2023-01-11 PROCEDURE — 77030026438 HC STYL ET INTUB CARD -A: Performed by: ANESTHESIOLOGY

## 2023-01-11 PROCEDURE — 77030030073 HC APPL ENDOSC CLP AMR -C: Performed by: SURGERY

## 2023-01-11 PROCEDURE — 74011250636 HC RX REV CODE- 250/636: Performed by: NURSE ANESTHETIST, CERTIFIED REGISTERED

## 2023-01-11 PROCEDURE — 74011000258 HC RX REV CODE- 258: Performed by: NURSE ANESTHETIST, CERTIFIED REGISTERED

## 2023-01-11 PROCEDURE — 83036 HEMOGLOBIN GLYCOSYLATED A1C: CPT

## 2023-01-11 PROCEDURE — 74011250636 HC RX REV CODE- 250/636: Performed by: SURGERY

## 2023-01-11 PROCEDURE — 74011250636 HC RX REV CODE- 250/636: Performed by: STUDENT IN AN ORGANIZED HEALTH CARE EDUCATION/TRAINING PROGRAM

## 2023-01-11 PROCEDURE — 76210000006 HC OR PH I REC 0.5 TO 1 HR: Performed by: SURGERY

## 2023-01-11 PROCEDURE — 74011000250 HC RX REV CODE- 250: Performed by: SURGERY

## 2023-01-11 PROCEDURE — 77030027484 HC SUT ENDOSTCH COVD -B: Performed by: SURGERY

## 2023-01-11 PROCEDURE — 77030013079 HC BLNKT BAIR HGGR 3M -A: Performed by: ANESTHESIOLOGY

## 2023-01-11 PROCEDURE — C9290 INJ, BUPIVACAINE LIPOSOME: HCPCS | Performed by: SURGERY

## 2023-01-11 PROCEDURE — 77030016151 HC PROTCTR LNS DFOG COVD -B: Performed by: SURGERY

## 2023-01-11 PROCEDURE — 0D164ZA BYPASS STOMACH TO JEJUNUM, PERCUTANEOUS ENDOSCOPIC APPROACH: ICD-10-PCS | Performed by: SURGERY

## 2023-01-11 PROCEDURE — 77030009968 HC RELD STPLR ENDOSC J&J -D: Performed by: SURGERY

## 2023-01-11 PROCEDURE — 74011000250 HC RX REV CODE- 250: Performed by: NURSE ANESTHETIST, CERTIFIED REGISTERED

## 2023-01-11 PROCEDURE — 2709999900 HC NON-CHARGEABLE SUPPLY: Performed by: SURGERY

## 2023-01-11 PROCEDURE — 77030010031 HC SCIS ENDOSC MPLR J&J -C: Performed by: SURGERY

## 2023-01-11 PROCEDURE — 84132 ASSAY OF SERUM POTASSIUM: CPT

## 2023-01-11 PROCEDURE — 77030012770 HC TRCR OPT FX AMR -B: Performed by: SURGERY

## 2023-01-11 PROCEDURE — 77030040922 HC BLNKT HYPOTHRM STRY -A: Performed by: SURGERY

## 2023-01-11 PROCEDURE — 77030002933 HC SUT MCRYL J&J -A: Performed by: SURGERY

## 2023-01-11 PROCEDURE — 77030008683 HC TU ET CUF COVD -A: Performed by: ANESTHESIOLOGY

## 2023-01-11 PROCEDURE — 99231 SBSQ HOSP IP/OBS SF/LOW 25: CPT | Performed by: STUDENT IN AN ORGANIZED HEALTH CARE EDUCATION/TRAINING PROGRAM

## 2023-01-11 PROCEDURE — 77030034154 HC SHR COAG HARM ACE J&J -F: Performed by: SURGERY

## 2023-01-11 PROCEDURE — 76010000132 HC OR TIME 2.5 TO 3 HR: Performed by: SURGERY

## 2023-01-11 PROCEDURE — 74011250636 HC RX REV CODE- 250/636: Performed by: ANESTHESIOLOGY

## 2023-01-11 PROCEDURE — 2709999900 HC NON-CHARGEABLE SUPPLY

## 2023-01-11 PROCEDURE — 77030010507 HC ADH SKN DERMBND J&J -B: Performed by: SURGERY

## 2023-01-11 PROCEDURE — 77030009957 HC RELD ENDOSTCH COVD -C: Performed by: SURGERY

## 2023-01-11 PROCEDURE — 77030041460 HC APL VISTASEAL J&J -B: Performed by: SURGERY

## 2023-01-11 PROCEDURE — 43644 LAP GASTRIC BYPASS/ROUX-EN-Y: CPT | Performed by: SURGERY

## 2023-01-11 PROCEDURE — 77030020053 HC ELECTRD LAPSCP COVD -B: Performed by: SURGERY

## 2023-01-11 PROCEDURE — 76060000036 HC ANESTHESIA 2.5 TO 3 HR: Performed by: SURGERY

## 2023-01-11 PROCEDURE — 77030003666 HC NDL SPINAL BD -A: Performed by: SURGERY

## 2023-01-11 PROCEDURE — 77030020829: Performed by: SURGERY

## 2023-01-11 RX ORDER — ACETAMINOPHEN 650 MG/1
SUPPOSITORY RECTAL AS NEEDED
Status: DISCONTINUED | OUTPATIENT
Start: 2023-01-11 | End: 2023-01-11 | Stop reason: HOSPADM

## 2023-01-11 RX ORDER — INSULIN LISPRO 100 [IU]/ML
INJECTION, SOLUTION INTRAVENOUS; SUBCUTANEOUS ONCE
Status: DISCONTINUED | OUTPATIENT
Start: 2023-01-11 | End: 2023-01-11 | Stop reason: HOSPADM

## 2023-01-11 RX ORDER — SODIUM CHLORIDE 0.9 % (FLUSH) 0.9 %
5-40 SYRINGE (ML) INJECTION EVERY 8 HOURS
Status: DISCONTINUED | OUTPATIENT
Start: 2023-01-11 | End: 2023-01-11 | Stop reason: HOSPADM

## 2023-01-11 RX ORDER — MONTELUKAST SODIUM 10 MG/1
10 TABLET ORAL DAILY
Status: DISCONTINUED | OUTPATIENT
Start: 2023-01-12 | End: 2023-01-12 | Stop reason: HOSPADM

## 2023-01-11 RX ORDER — LOSARTAN POTASSIUM 25 MG/1
25 TABLET ORAL DAILY
Status: DISCONTINUED | OUTPATIENT
Start: 2023-01-12 | End: 2023-01-12 | Stop reason: HOSPADM

## 2023-01-11 RX ORDER — DEXTROSE MONOHYDRATE 100 MG/ML
0-250 INJECTION, SOLUTION INTRAVENOUS AS NEEDED
Status: DISCONTINUED | OUTPATIENT
Start: 2023-01-11 | End: 2023-01-11 | Stop reason: HOSPADM

## 2023-01-11 RX ORDER — FENTANYL CITRATE 50 UG/ML
INJECTION, SOLUTION INTRAMUSCULAR; INTRAVENOUS AS NEEDED
Status: DISCONTINUED | OUTPATIENT
Start: 2023-01-11 | End: 2023-01-11 | Stop reason: HOSPADM

## 2023-01-11 RX ORDER — IBUPROFEN 200 MG
4 TABLET ORAL AS NEEDED
Status: DISCONTINUED | OUTPATIENT
Start: 2023-01-11 | End: 2023-01-11 | Stop reason: HOSPADM

## 2023-01-11 RX ORDER — ROCURONIUM BROMIDE 10 MG/ML
INJECTION, SOLUTION INTRAVENOUS AS NEEDED
Status: DISCONTINUED | OUTPATIENT
Start: 2023-01-11 | End: 2023-01-11 | Stop reason: HOSPADM

## 2023-01-11 RX ORDER — ONDANSETRON 2 MG/ML
4 INJECTION INTRAMUSCULAR; INTRAVENOUS
Status: COMPLETED | OUTPATIENT
Start: 2023-01-11 | End: 2023-01-11

## 2023-01-11 RX ORDER — AMLODIPINE BESYLATE 10 MG/1
10 TABLET ORAL DAILY
Status: DISCONTINUED | OUTPATIENT
Start: 2023-01-12 | End: 2023-01-12 | Stop reason: HOSPADM

## 2023-01-11 RX ORDER — ISOSORBIDE MONONITRATE 30 MG/1
30 TABLET, EXTENDED RELEASE ORAL DAILY
Status: DISCONTINUED | OUTPATIENT
Start: 2023-01-12 | End: 2023-01-12 | Stop reason: HOSPADM

## 2023-01-11 RX ORDER — SODIUM CHLORIDE, SODIUM LACTATE, POTASSIUM CHLORIDE, CALCIUM CHLORIDE 600; 310; 30; 20 MG/100ML; MG/100ML; MG/100ML; MG/100ML
100 INJECTION, SOLUTION INTRAVENOUS CONTINUOUS
Status: DISCONTINUED | OUTPATIENT
Start: 2023-01-11 | End: 2023-01-11 | Stop reason: HOSPADM

## 2023-01-11 RX ORDER — SCOLOPAMINE TRANSDERMAL SYSTEM 1 MG/1
1 PATCH, EXTENDED RELEASE TRANSDERMAL
Status: DISCONTINUED | OUTPATIENT
Start: 2023-01-11 | End: 2023-01-12 | Stop reason: HOSPADM

## 2023-01-11 RX ORDER — ESMOLOL HYDROCHLORIDE 10 MG/ML
INJECTION INTRAVENOUS AS NEEDED
Status: DISCONTINUED | OUTPATIENT
Start: 2023-01-11 | End: 2023-01-11 | Stop reason: HOSPADM

## 2023-01-11 RX ORDER — DIPHENHYDRAMINE HYDROCHLORIDE 50 MG/ML
12.5 INJECTION, SOLUTION INTRAMUSCULAR; INTRAVENOUS
Status: DISCONTINUED | OUTPATIENT
Start: 2023-01-11 | End: 2023-01-11 | Stop reason: HOSPADM

## 2023-01-11 RX ORDER — NALOXONE HYDROCHLORIDE 0.4 MG/ML
0.4 INJECTION, SOLUTION INTRAMUSCULAR; INTRAVENOUS; SUBCUTANEOUS AS NEEDED
Status: DISCONTINUED | OUTPATIENT
Start: 2023-01-11 | End: 2023-01-12 | Stop reason: HOSPADM

## 2023-01-11 RX ORDER — FENTANYL CITRATE 50 UG/ML
25 INJECTION, SOLUTION INTRAMUSCULAR; INTRAVENOUS AS NEEDED
Status: DISCONTINUED | OUTPATIENT
Start: 2023-01-11 | End: 2023-01-11 | Stop reason: HOSPADM

## 2023-01-11 RX ORDER — SUCCINYLCHOLINE CHLORIDE 20 MG/ML
INJECTION INTRAMUSCULAR; INTRAVENOUS AS NEEDED
Status: DISCONTINUED | OUTPATIENT
Start: 2023-01-11 | End: 2023-01-11 | Stop reason: HOSPADM

## 2023-01-11 RX ORDER — DEXTROSE MONOHYDRATE 100 MG/ML
0-250 INJECTION, SOLUTION INTRAVENOUS AS NEEDED
Status: DISCONTINUED | OUTPATIENT
Start: 2023-01-11 | End: 2023-01-11 | Stop reason: SDUPTHER

## 2023-01-11 RX ORDER — POTASSIUM CHLORIDE 7.45 MG/ML
10 INJECTION INTRAVENOUS
Status: COMPLETED | OUTPATIENT
Start: 2023-01-12 | End: 2023-01-12

## 2023-01-11 RX ORDER — SODIUM CHLORIDE, SODIUM LACTATE, POTASSIUM CHLORIDE, CALCIUM CHLORIDE 600; 310; 30; 20 MG/100ML; MG/100ML; MG/100ML; MG/100ML
125 INJECTION, SOLUTION INTRAVENOUS CONTINUOUS
Status: DISCONTINUED | OUTPATIENT
Start: 2023-01-11 | End: 2023-01-12 | Stop reason: HOSPADM

## 2023-01-11 RX ORDER — FUROSEMIDE 20 MG/1
20 TABLET ORAL DAILY
Status: DISCONTINUED | OUTPATIENT
Start: 2023-01-12 | End: 2023-01-12

## 2023-01-11 RX ORDER — ACETAMINOPHEN 325 MG/1
650 TABLET ORAL EVERY 6 HOURS
Status: DISCONTINUED | OUTPATIENT
Start: 2023-01-11 | End: 2023-01-12 | Stop reason: HOSPADM

## 2023-01-11 RX ORDER — ONDANSETRON 2 MG/ML
INJECTION INTRAMUSCULAR; INTRAVENOUS AS NEEDED
Status: DISCONTINUED | OUTPATIENT
Start: 2023-01-11 | End: 2023-01-11 | Stop reason: HOSPADM

## 2023-01-11 RX ORDER — OXYCODONE AND ACETAMINOPHEN 5; 325 MG/1; MG/1
1 TABLET ORAL AS NEEDED
Status: DISCONTINUED | OUTPATIENT
Start: 2023-01-11 | End: 2023-01-11 | Stop reason: HOSPADM

## 2023-01-11 RX ORDER — METOPROLOL TARTRATE 5 MG/5ML
INJECTION INTRAVENOUS AS NEEDED
Status: DISCONTINUED | OUTPATIENT
Start: 2023-01-11 | End: 2023-01-11 | Stop reason: HOSPADM

## 2023-01-11 RX ORDER — DIPHENHYDRAMINE HYDROCHLORIDE 50 MG/ML
25 INJECTION, SOLUTION INTRAMUSCULAR; INTRAVENOUS
Status: DISCONTINUED | OUTPATIENT
Start: 2023-01-11 | End: 2023-01-12 | Stop reason: HOSPADM

## 2023-01-11 RX ORDER — ONDANSETRON 2 MG/ML
4 INJECTION INTRAMUSCULAR; INTRAVENOUS
Status: DISCONTINUED | OUTPATIENT
Start: 2023-01-11 | End: 2023-01-12 | Stop reason: HOSPADM

## 2023-01-11 RX ORDER — HYDROMORPHONE HYDROCHLORIDE 1 MG/ML
0.5 INJECTION, SOLUTION INTRAMUSCULAR; INTRAVENOUS; SUBCUTANEOUS
Status: DISCONTINUED | OUTPATIENT
Start: 2023-01-11 | End: 2023-01-11 | Stop reason: HOSPADM

## 2023-01-11 RX ORDER — INSULIN LISPRO 100 [IU]/ML
INJECTION, SOLUTION INTRAVENOUS; SUBCUTANEOUS EVERY 6 HOURS
Status: DISCONTINUED | OUTPATIENT
Start: 2023-01-11 | End: 2023-01-11 | Stop reason: SDUPTHER

## 2023-01-11 RX ORDER — BUPIVACAINE HYDROCHLORIDE 2.5 MG/ML
INJECTION, SOLUTION EPIDURAL; INFILTRATION; INTRACAUDAL AS NEEDED
Status: DISCONTINUED | OUTPATIENT
Start: 2023-01-11 | End: 2023-01-11 | Stop reason: HOSPADM

## 2023-01-11 RX ORDER — HYDROMORPHONE HYDROCHLORIDE 1 MG/ML
1 INJECTION, SOLUTION INTRAMUSCULAR; INTRAVENOUS; SUBCUTANEOUS
Status: DISCONTINUED | OUTPATIENT
Start: 2023-01-11 | End: 2023-01-12 | Stop reason: HOSPADM

## 2023-01-11 RX ORDER — DEXAMETHASONE SODIUM PHOSPHATE 4 MG/ML
INJECTION, SOLUTION INTRA-ARTICULAR; INTRALESIONAL; INTRAMUSCULAR; INTRAVENOUS; SOFT TISSUE AS NEEDED
Status: DISCONTINUED | OUTPATIENT
Start: 2023-01-11 | End: 2023-01-11 | Stop reason: HOSPADM

## 2023-01-11 RX ORDER — LIDOCAINE HYDROCHLORIDE 20 MG/ML
INJECTION, SOLUTION EPIDURAL; INFILTRATION; INTRACAUDAL; PERINEURAL AS NEEDED
Status: DISCONTINUED | OUTPATIENT
Start: 2023-01-11 | End: 2023-01-11 | Stop reason: HOSPADM

## 2023-01-11 RX ORDER — APREPITANT 40 MG/1
40 CAPSULE ORAL ONCE
Status: COMPLETED | OUTPATIENT
Start: 2023-01-11 | End: 2023-01-11

## 2023-01-11 RX ORDER — ENOXAPARIN SODIUM 100 MG/ML
40 INJECTION SUBCUTANEOUS
Status: COMPLETED | OUTPATIENT
Start: 2023-01-11 | End: 2023-01-11

## 2023-01-11 RX ORDER — ALBUTEROL SULFATE 0.83 MG/ML
2.5 SOLUTION RESPIRATORY (INHALATION)
Status: DISCONTINUED | OUTPATIENT
Start: 2023-01-11 | End: 2023-01-12 | Stop reason: HOSPADM

## 2023-01-11 RX ORDER — DEXTROSE MONOHYDRATE 100 MG/ML
0-250 INJECTION, SOLUTION INTRAVENOUS AS NEEDED
Status: DISCONTINUED | OUTPATIENT
Start: 2023-01-11 | End: 2023-01-12 | Stop reason: HOSPADM

## 2023-01-11 RX ORDER — HYOSCYAMINE SULFATE 0.12 MG/1
0.12 TABLET SUBLINGUAL
Status: DISCONTINUED | OUTPATIENT
Start: 2023-01-11 | End: 2023-01-12 | Stop reason: HOSPADM

## 2023-01-11 RX ORDER — SODIUM CHLORIDE 0.9 % (FLUSH) 0.9 %
5-40 SYRINGE (ML) INJECTION AS NEEDED
Status: DISCONTINUED | OUTPATIENT
Start: 2023-01-11 | End: 2023-01-11 | Stop reason: HOSPADM

## 2023-01-11 RX ORDER — ENOXAPARIN SODIUM 100 MG/ML
40 INJECTION SUBCUTANEOUS EVERY 24 HOURS
Status: DISCONTINUED | OUTPATIENT
Start: 2023-01-12 | End: 2023-01-12 | Stop reason: HOSPADM

## 2023-01-11 RX ORDER — INSULIN LISPRO 100 [IU]/ML
INJECTION, SOLUTION INTRAVENOUS; SUBCUTANEOUS
Status: DISCONTINUED | OUTPATIENT
Start: 2023-01-12 | End: 2023-01-12 | Stop reason: HOSPADM

## 2023-01-11 RX ORDER — SODIUM CHLORIDE, SODIUM LACTATE, POTASSIUM CHLORIDE, CALCIUM CHLORIDE 600; 310; 30; 20 MG/100ML; MG/100ML; MG/100ML; MG/100ML
50 INJECTION, SOLUTION INTRAVENOUS CONTINUOUS
Status: DISCONTINUED | OUTPATIENT
Start: 2023-01-11 | End: 2023-01-11 | Stop reason: HOSPADM

## 2023-01-11 RX ORDER — PROPOFOL 10 MG/ML
INJECTION, EMULSION INTRAVENOUS AS NEEDED
Status: DISCONTINUED | OUTPATIENT
Start: 2023-01-11 | End: 2023-01-11 | Stop reason: HOSPADM

## 2023-01-11 RX ORDER — OXYCODONE HYDROCHLORIDE 5 MG/1
5 TABLET ORAL
Status: DISCONTINUED | OUTPATIENT
Start: 2023-01-11 | End: 2023-01-12 | Stop reason: HOSPADM

## 2023-01-11 RX ORDER — SODIUM CHLORIDE, SODIUM LACTATE, POTASSIUM CHLORIDE, CALCIUM CHLORIDE 600; 310; 30; 20 MG/100ML; MG/100ML; MG/100ML; MG/100ML
INJECTION, SOLUTION INTRAVENOUS
Status: DISCONTINUED | OUTPATIENT
Start: 2023-01-11 | End: 2023-01-11 | Stop reason: HOSPADM

## 2023-01-11 RX ORDER — INSULIN LISPRO 100 [IU]/ML
INJECTION, SOLUTION INTRAVENOUS; SUBCUTANEOUS ONCE
Status: COMPLETED | OUTPATIENT
Start: 2023-01-11 | End: 2023-01-11

## 2023-01-11 RX ORDER — ROSUVASTATIN CALCIUM 20 MG/1
40 TABLET, COATED ORAL DAILY
Status: DISCONTINUED | OUTPATIENT
Start: 2023-01-12 | End: 2023-01-12 | Stop reason: HOSPADM

## 2023-01-11 RX ORDER — PREGABALIN 75 MG/1
150 CAPSULE ORAL 2 TIMES DAILY
Status: DISCONTINUED | OUTPATIENT
Start: 2023-01-12 | End: 2023-01-12 | Stop reason: HOSPADM

## 2023-01-11 RX ORDER — IBUPROFEN 200 MG
4 TABLET ORAL AS NEEDED
Status: DISCONTINUED | OUTPATIENT
Start: 2023-01-11 | End: 2023-01-12 | Stop reason: HOSPADM

## 2023-01-11 RX ORDER — IPRATROPIUM BROMIDE AND ALBUTEROL SULFATE 2.5; .5 MG/3ML; MG/3ML
3 SOLUTION RESPIRATORY (INHALATION)
Status: DISCONTINUED | OUTPATIENT
Start: 2023-01-11 | End: 2023-01-12 | Stop reason: HOSPADM

## 2023-01-11 RX ADMIN — ONDANSETRON 4 MG: 2 INJECTION INTRAMUSCULAR; INTRAVENOUS at 14:33

## 2023-01-11 RX ADMIN — PROPOFOL 10 MG: 10 INJECTION, EMULSION INTRAVENOUS at 13:38

## 2023-01-11 RX ADMIN — DEXAMETHASONE SODIUM PHOSPHATE 4 MG: 4 INJECTION, SOLUTION INTRAMUSCULAR; INTRAVENOUS at 11:40

## 2023-01-11 RX ADMIN — POTASSIUM CHLORIDE 10 MEQ: 7.46 INJECTION, SOLUTION INTRAVENOUS at 23:52

## 2023-01-11 RX ADMIN — PROPOFOL 10 MG: 10 INJECTION, EMULSION INTRAVENOUS at 13:40

## 2023-01-11 RX ADMIN — PROPOFOL 120 MG: 10 INJECTION, EMULSION INTRAVENOUS at 11:10

## 2023-01-11 RX ADMIN — ACETAMINOPHEN 650 MG: 325 TABLET, FILM COATED ORAL at 23:52

## 2023-01-11 RX ADMIN — PROPOFOL 10 MG: 10 INJECTION, EMULSION INTRAVENOUS at 13:35

## 2023-01-11 RX ADMIN — SUGAMMADEX 200 MG: 100 INJECTION, SOLUTION INTRAVENOUS at 13:43

## 2023-01-11 RX ADMIN — FENTANYL CITRATE 50 MCG: 50 INJECTION, SOLUTION INTRAMUSCULAR; INTRAVENOUS at 11:55

## 2023-01-11 RX ADMIN — CEFAZOLIN SODIUM 3 G: 1 INJECTION, POWDER, FOR SOLUTION INTRAMUSCULAR; INTRAVENOUS at 11:40

## 2023-01-11 RX ADMIN — HYDROMORPHONE HYDROCHLORIDE 1 MG: 1 INJECTION, SOLUTION INTRAMUSCULAR; INTRAVENOUS; SUBCUTANEOUS at 18:20

## 2023-01-11 RX ADMIN — SUCCINYLCHOLINE CHLORIDE 160 MG: 20 INJECTION, SOLUTION INTRAMUSCULAR; INTRAVENOUS at 11:11

## 2023-01-11 RX ADMIN — ROCURONIUM BROMIDE 30 MG: 50 INJECTION INTRAVENOUS at 11:56

## 2023-01-11 RX ADMIN — METOPROLOL TARTRATE 1 MG: 5 INJECTION, SOLUTION INTRAVENOUS at 11:44

## 2023-01-11 RX ADMIN — ONDANSETRON 4 MG: 2 INJECTION INTRAMUSCULAR; INTRAVENOUS at 18:27

## 2023-01-11 RX ADMIN — FENTANYL CITRATE 50 MCG: 50 INJECTION, SOLUTION INTRAMUSCULAR; INTRAVENOUS at 11:08

## 2023-01-11 RX ADMIN — PROPOFOL 50 MG: 10 INJECTION, EMULSION INTRAVENOUS at 11:12

## 2023-01-11 RX ADMIN — ESMOLOL HYDROCHLORIDE 10 MG: 10 INJECTION, SOLUTION INTRAVENOUS at 11:13

## 2023-01-11 RX ADMIN — PHENYLEPHRINE HYDROCHLORIDE 100 MCG: 10 INJECTION INTRAVENOUS at 12:05

## 2023-01-11 RX ADMIN — LIDOCAINE HYDROCHLORIDE 100 MG: 20 INJECTION, SOLUTION EPIDURAL; INFILTRATION; INTRACAUDAL; PERINEURAL at 11:08

## 2023-01-11 RX ADMIN — FAMOTIDINE 20 MG: 10 INJECTION, SOLUTION INTRAVENOUS at 09:28

## 2023-01-11 RX ADMIN — FENTANYL CITRATE 50 MCG: 50 INJECTION, SOLUTION INTRAMUSCULAR; INTRAVENOUS at 13:54

## 2023-01-11 RX ADMIN — PROPOFOL 20 MG: 10 INJECTION, EMULSION INTRAVENOUS at 11:14

## 2023-01-11 RX ADMIN — ROCURONIUM BROMIDE 10 MG: 50 INJECTION INTRAVENOUS at 12:35

## 2023-01-11 RX ADMIN — PHENYLEPHRINE HYDROCHLORIDE 50 MCG: 10 INJECTION INTRAVENOUS at 12:01

## 2023-01-11 RX ADMIN — ROCURONIUM BROMIDE 30 MG: 50 INJECTION INTRAVENOUS at 11:18

## 2023-01-11 RX ADMIN — SODIUM CHLORIDE, POTASSIUM CHLORIDE, SODIUM LACTATE AND CALCIUM CHLORIDE 50 ML/HR: 600; 310; 30; 20 INJECTION, SOLUTION INTRAVENOUS at 09:33

## 2023-01-11 RX ADMIN — ENOXAPARIN SODIUM 40 MG: 100 INJECTION SUBCUTANEOUS at 09:29

## 2023-01-11 RX ADMIN — PROPOFOL 10 MG: 10 INJECTION, EMULSION INTRAVENOUS at 12:19

## 2023-01-11 RX ADMIN — APREPITANT 40 MG: 40 CAPSULE ORAL at 09:28

## 2023-01-11 RX ADMIN — ACETAMINOPHEN 650 MG: 325 TABLET, FILM COATED ORAL at 18:23

## 2023-01-11 RX ADMIN — HYDROMORPHONE HYDROCHLORIDE 0.5 MG: 1 INJECTION, SOLUTION INTRAMUSCULAR; INTRAVENOUS; SUBCUTANEOUS at 14:17

## 2023-01-11 RX ADMIN — PROPOFOL 10 MG: 10 INJECTION, EMULSION INTRAVENOUS at 13:34

## 2023-01-11 RX ADMIN — SODIUM CHLORIDE, SODIUM LACTATE, POTASSIUM CHLORIDE, AND CALCIUM CHLORIDE: 600; 310; 30; 20 INJECTION, SOLUTION INTRAVENOUS at 11:01

## 2023-01-11 RX ADMIN — Medication 3 UNITS: at 14:15

## 2023-01-11 RX ADMIN — ONDANSETRON 4 MG: 2 INJECTION INTRAMUSCULAR; INTRAVENOUS at 13:30

## 2023-01-11 NOTE — ROUTINE PROCESS
TRANSFER - OUT REPORT:    Verbal report given to Terry Egan RN(name) on Joe Mix  being transferred to [facility] (Room 473)(unit) for routine post - op       Report consisted of patients Situation, Background, Assessment and   Recommendations(SBAR). Information from the following report(s) SBAR, Procedure Summary, MAR, Recent Results, and Cardiac Rhythm NSR  was reviewed with the receiving nurse. Lines:   Peripheral IV 01/11/23 Anterior;Distal;Left Forearm (Active)   Site Assessment Clean, dry, & intact 01/11/23 1443   Phlebitis Assessment 0 01/11/23 1443   Infiltration Assessment 0 01/11/23 1443   Dressing Status Clean, dry, & intact 01/11/23 1443   Dressing Type Tape;Transparent 01/11/23 1443   Hub Color/Line Status Blue; Infusing;Patent 01/11/23 1443   Alcohol Cap Used No 01/11/23 0924       Peripheral IV Left Antecubital (Active)   Site Assessment Clean, dry, & intact 01/11/23 1443   Phlebitis Assessment 0 01/11/23 1443   Infiltration Assessment 0 01/11/23 1443   Dressing Status Clean, dry, & intact 01/11/23 1443   Dressing Type Tape;Transparent 01/11/23 1443   Hub Color/Line Status Pink;Patent; Other (comment); Capped 01/11/23 1443        Opportunity for questions and clarification was provided.       Patient transported with:   O2 @ 2 liters  Tech

## 2023-01-11 NOTE — ROUTINE PROCESS
TRANSFER - OUT REPORT:    Verbal report given to Vignesh Michaels RN(name) on Armando Lee  being transferred to [facility] (Room 466)(unit) for routine post - op       Report consisted of patients Situation, Background, Assessment and   Recommendations(SBAR). Information from the following report(s) SBAR, Procedure Summary, Intake/Output, MAR, Recent Results, and Cardiac Rhythm NSR  was reviewed with the receiving nurse. Lines:   Peripheral IV 01/11/23 Anterior;Distal;Left Forearm (Active)   Site Assessment Clean, dry, & intact 01/11/23 1443   Phlebitis Assessment 0 01/11/23 1443   Infiltration Assessment 0 01/11/23 1443   Dressing Status Clean, dry, & intact 01/11/23 1443   Dressing Type Tape;Transparent 01/11/23 1443   Hub Color/Line Status Blue; Infusing;Patent 01/11/23 1443   Alcohol Cap Used No 01/11/23 0924       Peripheral IV Left Antecubital (Active)   Site Assessment Clean, dry, & intact 01/11/23 1443   Phlebitis Assessment 0 01/11/23 1443   Infiltration Assessment 0 01/11/23 1443   Dressing Status Clean, dry, & intact 01/11/23 1443   Dressing Type Tape;Transparent 01/11/23 1443   Hub Color/Line Status Pink;Patent; Other (comment); Capped 01/11/23 1443        Opportunity for questions and clarification was provided.       Patient transported with:   O2 @ 2 liters  Tech

## 2023-01-11 NOTE — OP NOTES
Operative Report    Patient: Jeny Park MRN: [id number]  SSN: [id number]    YOB: 1950  Age: 81 y.o. Sex: female       Date of Surgery: 1/11/2023     Preoperative Diagnosis: Morbid obesity (HCC) [E66.01]  Diabetic gastroparesis ([facility] [address] [E11.43, K31.84]  Essential hypertension [I10]  Hyperlipidemia, unspecified hyperlipidemia type [E78.5]  Coronary artery disease, unspecified vessel or lesion type, unspecified whether angina present, unspecified whether native or transplanted heart [I25.10]  PSVT (paroxysmal supraventricular tachycardia) ([address] [I47.1]  Pulmonary embolism, unspecified chronicity, unspecified pulmonary embolism type, unspecified whether acute cor pulmonale present ([address] [I26.99]  Edema, unspecified type [R60.9]  SOB (shortness of breath) [R06.02]     Postoperative Diagnosis: Morbid obesity ([address] [E66.01]  Diabetic gastroparesis ([address] [E11.43, K31.84]  Essential hypertension [I10]  Hyperlipidemia, unspecified hyperlipidemia type [E78.5]  Coronary artery disease, unspecified vessel or lesion type, unspecified whether angina present, unspecified whether native or transplanted heart [I25.10]  PSVT (paroxysmal supraventricular tachycardia) ([address] [I47.1]  Pulmonary embolism, unspecified chronicity, unspecified pulmonary embolism type, unspecified whether acute cor pulmonale present ([address] [I26.99]  Edema, unspecified type [R60.9]  SOB (shortness of breath) [R06.02]     Surgeon(s) and Role:     * Aliyah Leone MD - Primary    Anesthesia: General     Procedure:   Laparoscopic suzi-en-Y gastric bypass (-22 modifier due to patient frailty, severe hepatomegaly and visceral adiposity, increasing the complexity and technical challenge of the procedure)    Procedure in Detail: Jeny Park was identified in the pre-operative holding area. Informed consent was obtained after a complete discussion of risks, benefits and alternatives to surgery were had with the patient.     The patient was brought back to the operating room and placed under general endotracheal anesthesia in the supine position on the operating room table. SCDs were applied. Preop VTE prophylaxis as well as all other multimodal analgesia, GI prophylaxis, etc were verified. The patient was then prepped and draped in the usual sterile fashion after being appropriately padded and secured to the table. A timeout was performed verifying patient identity, planned procedure, medications, and all other pertinent aspects of the case. Due to her age and frailty, as well as chronic SOB and recent ER visit for likely dehydration, we hydrated the patient with IV fluids and ensured hemodynamic stability during repositioning the table in reverse Trendelenberg prior to incision and after insufflation prior to any anatomic alterations were performed. An incision was made at Anderson's point and a Verress needle was introduced into the peritoneal cavity. Saline drop test showed no blood on aspiration and free flow of saline into the peritoneal cavity. The peritoneal cavity was insufflated to 15mmHg without incident. A 5mm optical trocar was introduced, visualizing the layers of the abdominal wall on entry. No evidence of visceral injury was noted from trocar or verress needle placement. A 12mm trocar was placed above and to the left of the umbilicus, another above and to the right of the umbilicus, and a 5mm trocar placed in the right upper quadrant, all under vision of the laparoscope. A YOHANA block was performed under laparoscopic visualization bilaterally. We began by removing the omentum from the pelvis and placing up over the colon. We did note a wide mouthed, shallow depth umbilical hernia containing omentum which we left in place to plug the defect. There was severe hepatomegaly and visceral adiposity increasing the complexity and technical challenge of the procedure.      We lifted the colonic mesentery and clearly identified the Ligament of Treitz. We then ran counted down 75-100cm on the jejunum. We used a 60 mm white stapler load to divide the jejunum to create the BP limb. We then use the ultrasonic toyin to divide the small bowel mesentery. Next, as the assistant held the BP limb for orientation, I ran 125 cm of jejunum to create the Mable limb. We created enterotomies with the ultrasonic toyin in the BP limb and JJ portion of the Mable limb. After confirming orientation and anatomy, we used a 60 mm white load to create a stapled JJ anastomosis. I then used absorbable suture in running fashion to close the common enterotomy followed by a 2-0 permanent suture to close the JJ mesenteric defect. We then divided the greater omentum with the ultrasonic toyin to decrease future tension on the Mable limb. We then made an incision in the epigastrium and inserted the [facility] retractor. The left lobe of liver was elevated. Inspection of the hiatus did not demonstrate a hiatal hernia. We then used the harmonic and blunt dissection to take down the angle of His. We identified the GE junction. We then counted approximately 6-7 cm distal to this. I then used a combination of energy and blunt dissection to create a perigastric window into the lesser sac. I then used a 60 mm blue load to make a transverse staple line. The anesthesiologist/anesthetist then placed a 39 Serbian calibration tube down the patient's esophagus into the pouch for a guide. I used multiple loads laterally to finish the pouch using the calibration tube as a sizing guide. We ensured hemostasis. The mable limb was brought up in an antecolic/antegastric orientation to the gastric pouch. A two-layer sewn gastrojejunal anastomosis was performed using absorbable suture and the 36F calibration tube to size the anastomotic diameter.  We then placed a clamp on the bowel and performed an intraoperative leak test.     Intraoperative leak test did not demonstrate a leak.  The anastomosis was patent. We suctioned out the remaining free fluid in the belly. We placed a 2-0 vicryl suture at the 12 mm trocar site. We removed the [facility] retractor. We removed the trocars under direct visualization. The dermis was closed with 4-0 Monocryl followed by Dermabond for the skin. At the end of the procedure all instrument, needle, and sponge counts were correct. I was present and scrubbed throughout the entirety of the case. The patient awoke from anesthesia and was extubated without complication and sent to PACU in stable condition. Estimated Blood Loss:  minimal    Tourniquet Time: * No tourniquets in log *      Implants: * No implants in log *            Specimens: * No specimens in log *        Drains: None                Complications: None    Counts: Sponge and needle counts were correct times two.     Signed By:  Princess Owens MD     January 11, 2023

## 2023-01-11 NOTE — PERIOP NOTES
Contacted family [Violeta Garcia] and updated on patient status. Violeta was asked by the if she has patient's clothes and CPAP machine, which she stated she did.

## 2023-01-11 NOTE — ANESTHESIA PREPROCEDURE EVALUATION
Relevant Problems   RESPIRATORY SYSTEM   (+) SOB (shortness of breath)   (+) Sleep apnea   (+) Uncomplicated severe persistent asthma      CARDIOVASCULAR   (+) CAD (coronary artery disease)   (+) HTN (hypertension)   (+) PSVT (paroxysmal supraventricular tachycardia) (HCC)      GASTROINTESTINAL   (+) GERD (gastroesophageal reflux disease)      ENDOCRINE   (+) Diabetes mellitus with renal complications (HCC)   (+) Morbid obesity (HCC)   (+) Type II diabetes mellitus with complication, uncontrolled       Anesthetic History   No history of anesthetic complications            Review of Systems / Medical History  Patient summary reviewed, nursing notes reviewed and pertinent labs reviewed    Pulmonary        Sleep apnea    Asthma        Neuro/Psych   Within defined limits           Cardiovascular    Hypertension        Dysrhythmias (PSVT)   CAD and hyperlipidemia    Exercise tolerance: <4 METS     GI/Hepatic/Renal     GERD      Liver disease     Endo/Other    Diabetes    Morbid obesity (super morbid BMI >60) and arthritis     Other Findings   Comments: History of PE         Physical Exam    Airway  Mallampati: II  TM Distance: 4 - 6 cm  Neck ROM: normal range of motion   Mouth opening: Normal     Cardiovascular    Rhythm: regular  Rate: normal         Dental  No notable dental hx       Pulmonary  Breath sounds clear to auscultation               Abdominal  GI exam deferred       Other Findings            Anesthetic Plan    ASA: 3  Anesthesia type: general          Induction: Intravenous  Anesthetic plan and risks discussed with: Patient

## 2023-01-11 NOTE — INTERVAL H&P NOTE
Update History & Physical    The Patient's History and Physical of December 20,   History and procedure was reviewed with the patient and I examined the patient. Patient experienced SOB and dizziness while on the preoperative liquid diet and was seen at [facility] ER. Labs were unremarkable. She was hydrated and discharged without further incident. The surgical site was confirmed by the patient and me. Plan:  The risk, benefits, expected outcome, and alternative to the recommended procedure have been discussed with the patient. Patient understands and wants to proceed with the procedure.     Electronically signed by Calos Huang MD on 1/11/2023 at 10:46 AM

## 2023-01-11 NOTE — ANESTHESIA POSTPROCEDURE EVALUATION
Procedure(s):  LAPAROSCOPIC KEYLA-EN-Y GASTRIC BYPASS. general    Anesthesia Post Evaluation      Multimodal analgesia: multimodal analgesia used between 6 hours prior to anesthesia start to PACU discharge  Patient location during evaluation: bedside  Patient participation: complete - patient participated  Level of consciousness: awake  Pain management: adequate  Airway patency: patent  Anesthetic complications: no  Cardiovascular status: stable  Respiratory status: acceptable  Hydration status: acceptable  Post anesthesia nausea and vomiting:  controlled      INITIAL Post-op Vital signs:   Vitals Value Taken Time   /60 01/11/23 1553   Temp 36.4 °C (97.6 °F) 01/11/23 1443   Pulse 86 01/11/23 1602   Resp 19 01/11/23 1602   SpO2 99 % 01/11/23 1602   Vitals shown include unvalidated device data.

## 2023-01-11 NOTE — BRIEF OP NOTE
Brief Postoperative Note    Patient: Palmira Alanis  YOB: 1950  MRN: [id number]    Date of Procedure: 1/11/2023     Pre-Op Diagnosis: Morbid obesity ([facility] [E66.01]  Diabetic gastroparesis ([facility] [E11.43, K31.84]  Essential hypertension [I10]  Hyperlipidemia, unspecified hyperlipidemia type [E78.5]  Coronary artery disease, unspecified vessel or lesion type, unspecified whether angina present, unspecified whether native or transplanted heart [I25.10]  PSVT (paroxysmal supraventricular tachycardia) ([facility] [I47.1]  Pulmonary embolism, unspecified chronicity, unspecified pulmonary embolism type, unspecified whether acute cor pulmonale present ([facility] [I26.99]  Edema, unspecified type [R60.9]  SOB (shortness of breath) [R06.02]    Post-Op Diagnosis: Same as preoperative diagnosis. Procedure(s):  LAPAROSCOPIC KEYLA-EN-Y GASTRIC BYPASS    Surgeon(s):  Luly Santana MD    Surgical Assistant: Surg Asst-1: Gianna Reyes    Anesthesia: General     Estimated Blood Loss (mL): Minimal    Complications: None    Specimens: * No specimens in log *     Implants: * No implants in log *    Drains: * No LDAs found *    Findings: Wide mouthed, shallow depth umbilical hernia containing omentum, left alone.      Severe hepatomegaly and visceral adiposity increasing the duration and complexity of the procedure, but uneventful LRYGB, 120/80, sewn    Electronically Signed by Izabella Leal MD on 1/11/2023 at 1:32 PM

## 2023-01-12 VITALS
OXYGEN SATURATION: 97 % | DIASTOLIC BLOOD PRESSURE: 76 MMHG | BODY MASS INDEX: 57.52 KG/M2 | HEART RATE: 71 BPM | SYSTOLIC BLOOD PRESSURE: 157 MMHG | WEIGHT: 293 LBS | HEIGHT: 60 IN | RESPIRATION RATE: 20 BRPM | TEMPERATURE: 98.7 F

## 2023-01-12 LAB
ALBUMIN SERPL-MCNC: 2.8 G/DL (ref 3.4–5)
ALBUMIN/GLOB SERPL: 0.6 (ref 0.8–1.7)
ALP SERPL-CCNC: 73 U/L (ref 45–117)
ALT SERPL-CCNC: 48 U/L (ref 13–56)
ANION GAP SERPL CALC-SCNC: 7 MMOL/L (ref 3–18)
AST SERPL-CCNC: 64 U/L (ref 10–38)
BASOPHILS # BLD: 0 K/UL (ref 0–0.1)
BASOPHILS NFR BLD: 0 % (ref 0–2)
BILIRUB SERPL-MCNC: 0.2 MG/DL (ref 0.2–1)
BUN SERPL-MCNC: 13 MG/DL (ref 7–18)
BUN/CREAT SERPL: 11 (ref 12–20)
CALCIUM SERPL-MCNC: 9.4 MG/DL (ref 8.5–10.1)
CHLORIDE SERPL-SCNC: 102 MMOL/L (ref 100–111)
CO2 SERPL-SCNC: 31 MMOL/L (ref 21–32)
CREAT SERPL-MCNC: 1.19 MG/DL (ref 0.6–1.3)
DIFFERENTIAL METHOD BLD: ABNORMAL
EOSINOPHIL # BLD: 0 K/UL (ref 0–0.4)
EOSINOPHIL NFR BLD: 0 % (ref 0–5)
ERYTHROCYTE [DISTWIDTH] IN BLOOD BY AUTOMATED COUNT: 19.7 % (ref 11.6–14.5)
GLOBULIN SER CALC-MCNC: 4.9 G/DL (ref 2–4)
GLUCOSE BLD STRIP.AUTO-MCNC: 133 MG/DL (ref 70–110)
GLUCOSE BLD STRIP.AUTO-MCNC: 164 MG/DL (ref 70–110)
GLUCOSE SERPL-MCNC: 138 MG/DL (ref 74–99)
HCT VFR BLD AUTO: 39.1 % (ref 35–45)
HGB BLD-MCNC: 12.6 G/DL (ref 12–16)
IMM GRANULOCYTES # BLD AUTO: 0 K/UL (ref 0–0.04)
IMM GRANULOCYTES NFR BLD AUTO: 0 % (ref 0–0.5)
LYMPHOCYTES # BLD: 1.1 K/UL (ref 0.9–3.6)
LYMPHOCYTES NFR BLD: 13 % (ref 21–52)
MCH RBC QN AUTO: 25.9 PG (ref 24–34)
MCHC RBC AUTO-ENTMCNC: 32.2 G/DL (ref 31–37)
MCV RBC AUTO: 80.3 FL (ref 78–100)
MONOCYTES # BLD: 0.7 K/UL (ref 0.05–1.2)
MONOCYTES NFR BLD: 9 % (ref 3–10)
NEUTS SEG # BLD: 6.2 K/UL (ref 1.8–8)
NEUTS SEG NFR BLD: 77 % (ref 40–73)
NRBC # BLD: 0 K/UL (ref 0–0.01)
NRBC BLD-RTO: 0 PER 100 WBC
PLATELET # BLD AUTO: 402 K/UL (ref 135–420)
PMV BLD AUTO: 10.2 FL (ref 9.2–11.8)
POTASSIUM SERPL-SCNC: 3.6 MMOL/L (ref 3.5–5.5)
PROT SERPL-MCNC: 7.7 G/DL (ref 6.4–8.2)
RBC # BLD AUTO: 4.87 M/UL (ref 4.2–5.3)
SODIUM SERPL-SCNC: 140 MMOL/L (ref 136–145)
WBC # BLD AUTO: 8 K/UL (ref 4.6–13.2)

## 2023-01-12 PROCEDURE — C9113 INJ PANTOPRAZOLE SODIUM, VIA: HCPCS | Performed by: SURGERY

## 2023-01-12 PROCEDURE — 74011000250 HC RX REV CODE- 250: Performed by: SURGERY

## 2023-01-12 PROCEDURE — 74011250636 HC RX REV CODE- 250/636: Performed by: SURGERY

## 2023-01-12 PROCEDURE — 85025 COMPLETE CBC W/AUTO DIFF WBC: CPT

## 2023-01-12 PROCEDURE — 99232 SBSQ HOSP IP/OBS MODERATE 35: CPT | Performed by: INTERNAL MEDICINE

## 2023-01-12 PROCEDURE — 80053 COMPREHEN METABOLIC PANEL: CPT

## 2023-01-12 PROCEDURE — 74011250637 HC RX REV CODE- 250/637: Performed by: SURGERY

## 2023-01-12 PROCEDURE — 82962 GLUCOSE BLOOD TEST: CPT

## 2023-01-12 PROCEDURE — 97161 PT EVAL LOW COMPLEX 20 MIN: CPT

## 2023-01-12 PROCEDURE — 97165 OT EVAL LOW COMPLEX 30 MIN: CPT

## 2023-01-12 PROCEDURE — 36415 COLL VENOUS BLD VENIPUNCTURE: CPT

## 2023-01-12 PROCEDURE — 97530 THERAPEUTIC ACTIVITIES: CPT

## 2023-01-12 PROCEDURE — 74011250637 HC RX REV CODE- 250/637: Performed by: STUDENT IN AN ORGANIZED HEALTH CARE EDUCATION/TRAINING PROGRAM

## 2023-01-12 PROCEDURE — 74011250637 HC RX REV CODE- 250/637: Performed by: NURSE PRACTITIONER

## 2023-01-12 PROCEDURE — 74011250636 HC RX REV CODE- 250/636: Performed by: STUDENT IN AN ORGANIZED HEALTH CARE EDUCATION/TRAINING PROGRAM

## 2023-01-12 RX ORDER — INSULIN LISPRO 100 [IU]/ML
INJECTION, SOLUTION INTRAVENOUS; SUBCUTANEOUS
Qty: 1 EACH | Refills: 0 | Status: SHIPPED
Start: 2023-01-12

## 2023-01-12 RX ORDER — OMEPRAZOLE 40 MG/1
40 CAPSULE, DELAYED RELEASE ORAL DAILY
Qty: 90 CAPSULE | Refills: 0 | Status: SHIPPED | OUTPATIENT
Start: 2023-01-12

## 2023-01-12 RX ORDER — LOSARTAN POTASSIUM 25 MG/1
25 TABLET ORAL DAILY
Qty: 30 TABLET | Refills: 0 | Status: SHIPPED | OUTPATIENT
Start: 2023-01-12

## 2023-01-12 RX ADMIN — ACETAMINOPHEN 650 MG: 325 TABLET, FILM COATED ORAL at 12:10

## 2023-01-12 RX ADMIN — ENOXAPARIN SODIUM 40 MG: 100 INJECTION SUBCUTANEOUS at 09:09

## 2023-01-12 RX ADMIN — SODIUM CHLORIDE, POTASSIUM CHLORIDE, SODIUM LACTATE AND CALCIUM CHLORIDE 125 ML/HR: 600; 310; 30; 20 INJECTION, SOLUTION INTRAVENOUS at 02:03

## 2023-01-12 RX ADMIN — POTASSIUM CHLORIDE 10 MEQ: 7.46 INJECTION, SOLUTION INTRAVENOUS at 01:05

## 2023-01-12 RX ADMIN — MONTELUKAST 10 MG: 10 TABLET, FILM COATED ORAL at 09:10

## 2023-01-12 RX ADMIN — POTASSIUM CHLORIDE 10 MEQ: 7.46 INJECTION, SOLUTION INTRAVENOUS at 02:02

## 2023-01-12 RX ADMIN — ACETAMINOPHEN 650 MG: 325 TABLET, FILM COATED ORAL at 05:54

## 2023-01-12 RX ADMIN — ROSUVASTATIN CALCIUM 40 MG: 20 TABLET, COATED ORAL at 09:08

## 2023-01-12 RX ADMIN — AMLODIPINE BESYLATE 10 MG: 10 TABLET ORAL at 09:09

## 2023-01-12 RX ADMIN — PREGABALIN 150 MG: 75 CAPSULE ORAL at 09:08

## 2023-01-12 RX ADMIN — POTASSIUM CHLORIDE 10 MEQ: 7.46 INJECTION, SOLUTION INTRAVENOUS at 03:06

## 2023-01-12 RX ADMIN — OXYCODONE HYDROCHLORIDE 5 MG: 5 TABLET ORAL at 06:46

## 2023-01-12 RX ADMIN — SODIUM CHLORIDE 40 MG: 9 INJECTION, SOLUTION INTRAMUSCULAR; INTRAVENOUS; SUBCUTANEOUS at 09:08

## 2023-01-12 NOTE — ROUTINE PROCESS
Patient was educated on all discharge instructions below. He/she understood and was provided a copy. He/she knows who to call for any issues post discharge. Hydration  Hydration is your NUMBER ONE priority. Dehydration is the most common reason for readmission to the hospital. Dehydration occurs when  your body does not get enough fluid to keep it functioning at its best. Your body also requires fluid  to burn its stored fat calories for energy. Carry a bottle of water with you all day, even when you are away from home; remind yourself to  drink even if you dont feel thirsty. Drinking 64 ounces of fluid is your daily goal. You can tell if  youre getting enough fluid if youre making clear, light-colored urine five to 10 times per day. Signs of dehydration can be thirst, headache, hard stools or dizziness upon sitting or standing up. You should contact your surgeons office if you are unable to drink enough fluid to stay hydrated. --   [address] [facility] after Surgery   No lifting over 15 pounds for four weeks. No driving while taking the pain medication (about seven to 10 days). No tub baths, swimming or hot tubs until incisions are healed (about two weeks). You may shower. Clean incisions daily /gently with soap and check incisions for signs of infection:  -- Redness around incision. -- Swelling at site. -- Drainage with an foul odor (pus). -- Increase tenderness around incision. Take your temperature and resting pulse in the morning and evening. Record on tracking form  given to you. Call if your temperature is greater than 101 or your pulse rate is greater than 115. Please contact your surgeon if you are having excessive abdominal pain (that lasts longer than  four hours and does not improve with prescribed pain medication), vomiting or shortness of breath. Get up and move -- do not sit in one place for more than an hour.    You need to WALK (EXERCISE) for 30 minutes per day. -- Walking around your house does not count. -- Bike, treadmill and elliptical are OK. -- NO weight lifting or sit ups. If constipated take an adult dose of Miralax (available over the counter). Contact the doctors  office if Miralax doesnt help. You may swallow pills starting the day after surgery as long as they fit inside this [place]:   Continue to use your incentive spirometer (breathing machine) for the next couple of weeks to  help prevent pneumonia. [address]  Temperature/Heart Rate Log  Take your temperature and heart rate (pulse) twice a day for 14 days. Take both in the morning and  evening at about the same time each day (when you wake up and before you go to bed when you  are relaxed). Please contact your doctors office if your:   Temperature is higher than 101 degrees. Heart rate (pulse) is higher than 115 beats per minute  (normal heart rate is 60 to 100 beats per minute). How to Take Your Heart Rate (Pulse)   Turn your left hand so that your palm is face-up. With the index and middle fingers of your right  hand, draw a line from the base of your thumb to  just below the crease in your wrist. Your fingers  should nestle just to the left of the large tendon that  pops up when you bend your wrist toward you. Dont press too hard, that will make the pulse go  away. Use gentle pressure. Wait. It can take several seconds -- and several micro-adjustments in the placement of your two  fingers on your wrist -- to find your pulse. Just keep moving your fingers down or up your wrist  in small increments (and pausing for a few seconds) until you find it. How to Take Your Pulse Rate   Find a watch with a second hand and place it on your right wrist or on the table next  to your left hand. After finding your pulse, count the number of beats for 20 seconds.    Multiply by three to get your heart rate, or beats per minute  (or just count for 60 seconds for a math-free option). Normal, resting heart rate is about 60 to 100 beats per minute. Lovenox Self Injection Guide  Prepare  Step 1: Wash and dry your hands thoroughly. Step 2: Sit or lie in a comfortable position and choose an area  on the right or left side of the abdomen at least two inches away  from the belly button. Step 3: Clean the injection site with an alcohol swab and let dry. Inject  Step 4: Remove the needle cap by pulling it straight off the syringe and  discard it in a sharps . Step 5: With your other hand, pinch an inch of the cleansed area to  make a fold in the skin. Insert the full length of the needle straight  down -- at a 90? angle -- into the fold of skin. [address] [place]  Step 6: Press the plunger with your thumb until the syringe is empty. Then pull the needle straight out and release the skin fold. Dispose  Step 7: Point the needle down and away from yourself and others,  and then push down on the plunger to activate the safety shield. Step 8: Place the used syringe in the sharps . Do NOT expel the air bubble from the syringe before the injection. Administration should be alternated between the left and right abdominal wall. The whole length  of the needle should be introduced into a skin fold held between the thumb and forefinger; the  skin fold should be held throughout the injection. To minimize bruising, do not rub the injection  site after completion of the injection. Questions about LOVENOX? Call [phone]    9. DIET AND LIFESTYLE  Key Diet Principles Following Bariatric Surgery   Begin each meal with soft moist high protein foods (i.e. chicken, turkey, yogurt, tuna, eggs,  cottage cheese, other fish and seafood). Consume a minimum of 64 ounces of fluid each day to prevent dehydration. No straws. No food and fluid together.  Stop drinking 30 minutes before a meal. You may begin fluids again  30 minutes after you finish a meal.   Eat very slowly and chew all foods completely. Keep portions small. No simple sugars or high fat foods. No carbonated beverages. No caffeine. Eat three meals per day. No skipping. Avoid snacking between meals. No alcohol. No smoking. Two Flintstones Complete Chewable vitamins each day. Take one in the morning and one at night.   1,500 milligrams Calcium Citrate per day in separate dosages. Vitamin D 3: 5,000 IU taken per day. Vitamin B-12: Take 1,000 micrograms sublingually daily. Iron: 60 milligrams per day from Bariatric Advantage. Protein supplements of your choice. Must be low sugar (0 to 3 grams), low fat (0 to 3 grams) and  provide at least 35 to 40 grams of protein each day. You need 60 to 70 grams of protein (food  and supplements) each day. Minimum of 30 minutes of physical activity daily. Do not take NSAIDS . Do not take Steroids without your surgeons permission. Your Priorities After Surgery  ? Fluid: 64 ounces of fluid per day. ? Protein: 60 to 70 grams of protein per day. ? Walk every day. Clear Liquid Diet  One week of clear liquids: minimum of 64 ounces of fluid per day. Fluid:   Zero calorie liquids. No caffeine. No carbonation. No sugary drinks. No alcohol. No straws. Food   Protein drinks. Less than 3 grams of sugar and  3 grams of fat per serving. Protein drink should provide you with  60 to 70 grams of protein. Soft Protein Diet  Five weeks of soft protein (1 ounce for soft protein, 3 ounces of yogurt/cottage cheese). Three meals per day and 1 protein shake. Protein shakes should provide you with 30 grams of  protein on the soft protein diet. Slow transition:   First week on soft protein diet -- focus on yogurt, cottage cheese, eggs, vegetarian refried beans,  black beans, kidney beans and white beans.  (NO BAKED BEANS.)   Second through fourth week on soft protein diet -- focus on yogurt, cottage cheese, eggs,  canned tuna, canned chicken, tilapia and fish (needs to be soft enough to be cut up with a fork). Fifth week on soft protein diet -- focus on yogurt, cottage cheese, eggs, canned tuna, canned  chicken, tilapia, fish, salmon, chicken breast or turkey. Fluid is your #1 Priority! Continue clear liquids between meals. You will need 64 ounces of fluid per day. Fluids that you can have include:   Water. Zero calorie liquids. You will need to sip throughout the day and should therefore have a water bottle with you at all  times! No liquids with your meals. Stop 30 minutes before a meal and wait 30 minutes after a meal.ugary drinks. No alco  Protein  You will need 60 to 70 grams of protein per day. 60 to 70 grams of protein shakes when on the clear liquid diet (two to three shakes per day). 30 to 50 grams of protein shakes when on the soft protein diet (one shake per day). Eat Three Times Per Day  You will need to eat three times per day. My planned times are:  _________________________________________________________  _________________________________________________________  _________________________________________________________  Nausea, Vomiting, Stomach Pain  If you have problems with nausea, vomiting or stomach pain, try:   Eating slowly: 20 to 30 minutes per meal.   Chewing food thoroughly: 20 to 30 chews before food is swallowed. Small portions: measure portions in medicine cup. Stopping before feeling full. AVOIDING SUGAR and FRIED FOOD: sugar will cause dumping syndrome and lead to weight gain. Exercise  I will need to get a minimum of 30 minutes of exercise per day or 150 minutes of exercise per week. Walking, swimming, biking or elliptical.   Find something you enjoy! Vitamins  After surgery, you will need to take the following vitamins for the rest of your life -- FOREVER. Vitamin D 3: 5,000 IU per day. Calcium Citrate: 1,500 milligrams, taken separately.    Flintstones Complete: two per day, taken separately. Sublingual Vitamin B-12: 1,000 micrograms daily. Iron for menstruating women or patients with a history of low iron: 65 milligrams daily. We recommend going to www.[email or web address] Productionsage. [employer] and purchasing iron from there. The lemon-lime has 60 milligrams. This iron is better absorbed than over-the-counter iron. Clear Liquid Log  Getting your fluid in is top priority during this week. Fluids (MINIUM of 64 ounces per day):  ? 8 oz. ? 8 oz. ? 8 oz. ? 8 oz. ? 8 oz. ? 8 oz. ? 8 oz. ? 8 oz. ? 8 oz. ? 8 oz. ? 8 oz. ? 8 oz. Flintstones Complete Chewable: ? a.m. ? p.m. Calcium Citrate (1,500 milligrams/day):  Pill form  ? Two crushed pills (morning) ? Two crushed pills (afternoon) ? Two crushed pills (evening)  OR Upcal D (powder)  ? One pack/scoop ? One pack/scoop ? One pack/scoop  OR Celebrate Chewable Vitamins (500 mg each) or Bariatric Advantage Chewables (500 mg)  ? One chewable (morning) ? One chewable (afternoon) ? One chewable (evening)  OR Liquid Calcium Citrate  ? 1 tbsp. Calcium Citrate ? 1 tbsp. Calcium Citrate ? 1 tbsp. Calcium Citrate  Vitamin D3: ? 5,000 IU daily. Vitamin B-12: ? 1,000 micrograms per day. Iron (menstruating women or patients with a history of low iron):  ? 60 milligrams per day from Bariatric Advantage. Protein drinks (protein drinks should be under 3 grams of sugar and 3 grams of fat). Protein shake (60 grams per day): ? a.m. ? p.m. Exercise: ? 30 to 40 minutes per day. Bariatric Soft and Moist Diet Shopping List   alcium Citrate (1,500 milligrams/day):  Pill form  ? Two crushed pills (morning) ? Two crushed pills (afternoon) ? Two crushed pills (evening)  OR Upcal D (powder)  ? One pack/scoop ? One pack/scoop ? One pack/scoop  OR Celebrate Chewable Vitamins (500 mg each) or Bariatric Advantage Chewables (500 mg)  ? One chewable (morning) ? One chewable (afternoon) ? One chewable (evening)  OR Liquid Calcium Citrate  ? 1 tbsp. Calcium Citrate ? 1 tbsp.  Calcium Citrate ? 1 tbsp. Calcium Citrate  Vitamin D3: ? 5,000 IU daily  Vitamin B-12: ? 1,000 micrograms per day  Iron (menstruating women or  patients with a history of low iron):  ? 60 milligrams per day  from Bariatric Advantage  Protein drinks (protein drinks should be under  3 grams of sugar and 3 grams of fat). Protein shake (30 to 40 grams per day):  ? a.m. ? p.m. Exercise: ? 30 to 40 minutes per  Educated on Diet Progression    Tien Ashley Gastric Bypass and Sleeve Dietary Progression    Patient Name:   Date of Surgery: Ice Chips start once admitted on floor. Begin Bariatric Clear Liquid Diet on:     Clear Liquid Diet: 64 oz. of fluid per day  Low calorie, low sugar, non-carbonated beverages  Water, Crystal Light, Propel Water, Sugar Free Jell-O, Sugar Free Popsicles, Bouillon  Start protein supplement during this stage. (60-70 grams per day)  Getting your fluid in and staying hydrated is your #1 priority! The clear liquid diet will last for 7 days. Begin Bariatric Soft and Moist on: This stage of the diet will last for 5 weeks, unless otherwise instructed by your surgeon. Begin:  1 week post-op   End:  6 weeks post-op (or when you follow up with the Registered Dietitian)    Soft, moist, high protein foods: 3 meals per day plus protein supplements. Portions should emphasize on soft protein. Portions will be a MAXIMUM of:   1 ounce of solid food   2-3 ounces of cottage cheese and yogurt. Protein supplements should be between meals and provide 30-40 grams per day during soft protein diet. Continue to get 64 ounces of fluid in per day. Protein foods that are ok on the Soft and Moist Diet include:  Slow transition:  1st week on soft protein should focus on:  Yogurt, cottage cheese, eggs  2nd -4th  week on soft protein diet should focus on: yogurt, cottage cheese, eggs, canned tuna, canned chicken, tilapia, fish (needs to be soft enough to be cut up with a fork)  5th week on soft protein diet should focus on: Yogurt, cottage cheese, eggs, canned tuna, canned chicken, tilapia, fish, salmon, chicken breast, or turkey. Remember to continue to get 64 ounces of fluid daily on ALL Stages. To be advanced to Bariatric Maintenance Stage of the bariatric diet, follow up with the dietitian 6 weeks post-op, around:         For any additional questions, please refer to your blue binder that was provided to you at the start of the bariatric program.

## 2023-01-12 NOTE — DISCHARGE INSTRUCTIONS
DISCHARGE SUMMARY from Nurse    PATIENT INSTRUCTIONS:    After general anesthesia or intravenous sedation, for 24 hours or while taking prescription Narcotics:  Limit your activities  Do not drive and operate hazardous machinery  Do not make important personal or business decisions  Do  not drink alcoholic beverages  If you have not urinated within 8 hours after discharge, please contact your surgeon on call. Report the following to your surgeon:  Excessive pain, swelling, redness or odor of or around the surgical area  Temperature over 100.5  Nausea and vomiting lasting longer than 4 hours or if unable to take medications  Any signs of decreased circulation or nerve impairment to extremity: change in color, persistent  numbness, tingling, coldness or increase pain  Any questions    What to do at Home:  Recommended activity: Activity as tolerated,     If you experience any of the following symptoms Refer to [facility], [facility], please follow up with Dr. Yordan Anglin. *  Please give a list of your current medications to your Primary Care Provider. *  Please update this list whenever your medications are discontinued, doses are      changed, or new medications (including over-the-counter products) are added. *  Please carry medication information at all times in case of emergency situations. These are general instructions for a healthy lifestyle:    No smoking/ No tobacco products/ Avoid exposure to second hand smoke  Surgeon General's Warning:  Quitting smoking now greatly reduces serious risk to your health.     Obesity, smoking, and sedentary lifestyle greatly increases your risk for illness    A healthy diet, regular physical exercise & weight monitoring are important for maintaining a healthy lifestyle    You may be retaining fluid if you have a history of heart failure or if you experience any of the following symptoms:  Weight gain of 3 pounds or more overnight or 5 pounds in a week, increased swelling in our hands or feet or shortness of breath while lying flat in bed. Please call your doctor as soon as you notice any of these symptoms; do not wait until your next office visit. Patient armband removed and shredded   MyChart Activation    Thank you for requesting access to We[employer]In. Please follow the instructions below to securely access and download your online medical record. We[employer].In allows you to send messages to your doctor, view your test results, renew your prescriptions, schedule appointments, and more. How Do I Sign Up? In your internet browser, go to [email or web address]  Click on the First Time User? Click Here link in the Sign In box. You will be redirect to the New Member Sign Up page. Enter your WeStudy.In Access Code exactly as it appears below. You will not need to use this code after youve completed the sign-up process. If you do not sign up before the expiration date, you must request a new code. WeStudy.In Access Code: Activation code not generated  Current WeStudy.In Status: Active (This is the date your WeStudy.In access code will )    Enter the last four digits of your Social Security Number (xxxx) and Date of Birth (mm/dd/yyyy) as indicated and click Submit. You will be taken to the next sign-up page. Create a WeStudy.In ID. This will be your WeStudy.In login ID and cannot be changed, so think of one that is secure and easy to remember. Create a WeStudy.In password. You can change your password at any time. Enter your Password Reset Question and Answer. This can be used at a later time if you forget your password. Enter your e-mail address. You will receive e-mail notification when new information is available in [address] Click Sign Up. You can now view and download portions of your medical record. Click the [employer] link to download a portable copy of your medical information.     Additional Information    If you have questions, please visit the Frequently Asked Questions section of the C[employer]let website at [email or web address] Remember, Meetylhart is NOT to be used for urgent needs. For medical emergencies, dial 911. The discharge information has been reviewed with the {PATIENT PARENT GUARDIAN:89574}. The {PATIENT PARENT GUARDIAN:80030} verbalized understanding. Discharge medications reviewed with the {Dishcarge meds reviewed [facility] [place]:65450} and appropriate educational materials and side effects teaching were provided. ___________________________________________________________________________________________________________________________________  DISCHARGE SUMMARY from Nurse    PATIENT INSTRUCTIONS:    After general anesthesia or intravenous sedation, for 24 hours or while taking prescription Narcotics:  Limit your activities  Do not drive and operate hazardous machinery  Do not make important personal or business decisions  Do  not drink alcoholic beverages  If you have not urinated within 8 hours after discharge, please contact your surgeon on call. Report the following to your surgeon:  Excessive pain, swelling, redness or odor of or around the surgical area  Temperature over 100.5  Nausea and vomiting lasting longer than 4 hours or if unable to take medications  Any signs of decreased circulation or nerve impairment to extremity: change in color, persistent  numbness, tingling, coldness or increase pain  Any questions    What to do at Home:  Recommended activity: Activity as tolerated,     If you experience any of the following symptoms Refer to [facility], please follow up with Dr. Alex Fernández. *  Please give a list of your current medications to your Primary Care Provider. *  Please update this list whenever your medications are discontinued, doses are      changed, or new medications (including over-the-counter products) are added. *  Please carry medication information at all times in case of emergency situations.     These are general instructions for a healthy lifestyle:    No smoking/ No tobacco products/ Avoid exposure to second hand smoke  Surgeon General's Warning:  Quitting smoking now greatly reduces serious risk to your health. Obesity, smoking, and sedentary lifestyle greatly increases your risk for illness    A healthy diet, regular physical exercise & weight monitoring are important for maintaining a healthy lifestyle    You may be retaining fluid if you have a history of heart failure or if you experience any of the following symptoms:  Weight gain of 3 pounds or more overnight or 5 pounds in a week, increased swelling in our hands or feet or shortness of breath while lying flat in bed. Please call your doctor as soon as you notice any of these symptoms; do not wait until your next office visit. Patient armband removed and shredded   MyChart Activation    Thank you for requesting access to [facility]. Please follow the instructions below to securely access and download your online medical record. [facility] allows you to send messages to your doctor, view your test results, renew your prescriptions, schedule appointments, and more. How Do I Sign Up? In your internet browser, go to [email or web address]  Click on the First Time User? Click Here link in the Sign In box. You will be redirect to the New Member Sign Up page. Enter your [facility] Access Code exactly as it appears below. You will not need to use this code after youve completed the sign-up process. If you do not sign up before the expiration date, you must request a new code. [facility] Access Code: Activation code not generated  Current [facility] Status: Active (This is the date your [facility] access code will )    Enter the last four digits of your Social Security Number (xxxx) and Date of Birth (mm/dd/yyyy) as indicated and click Submit. You will be taken to the next sign-up page. Create a [facility] ID.  This will be your [facility] login ID and cannot be changed, so think of one that is secure and easy to remember. Create a Triton password. You can change your password at any time. Enter your Password Reset Question and Answer. This can be used at a later time if you forget your password. Enter your e-mail address. You will receive e-mail notification when new information is available in [address] Click Sign Up. You can now view and download portions of your medical record. Click the [place] link to download a portable copy of your medical information. Additional Information    If you have questions, please visit the Frequently Asked Questions section of the Triton website at [email or web address] Remember, [employer] is NOT to be used for urgent needs. For medical emergencies, dial 911. The discharge information has been reviewed with the {PATIENT PARENT GUARDIAN:23982}. The {PATIENT PARENT GUARDIAN:58094} verbalized understanding. Discharge medications reviewed with the {Dishcarge meds reviewed [facility]:42752} and appropriate educational materials and side effects teaching were provided.   ___________________________________________________________________________________________________________________________________

## 2023-01-12 NOTE — DISCHARGE SUMMARY
Bariatric Surgery Discharge Progress Note    Admission Date: 1/11/2023    Discharge Date: 1/12/2023      Preoperative Diagnosis: Morbid obesity ([facility] [E66.01]  Diabetic gastroparesis ([facility] [E11.43, K31.84]  Essential hypertension [I10]  Hyperlipidemia, unspecified hyperlipidemia type [E78.5]  Coronary artery disease, unspecified vessel or lesion type, unspecified whether angina present, unspecified whether native or transplanted heart [I25.10]  PSVT (paroxysmal supraventricular tachycardia) ([facility] [I47.1]  Pulmonary embolism, unspecified chronicity, unspecified pulmonary embolism type, unspecified whether acute cor pulmonale present ([address] [I26.99]  Edema, unspecified type [R60.9]  SOB (shortness of breath) [R06.02]      Postoperative Diagnosis: Morbid obesity ([facility] [E66.01]  Diabetic gastroparesis ([facility] [E11.43, K31.84]  Essential hypertension [I10]  Hyperlipidemia, unspecified hyperlipidemia type [E78.5]  Coronary artery disease, unspecified vessel or lesion type, unspecified whether angina present, unspecified whether native or transplanted heart [I25.10]  PSVT (paroxysmal supraventricular tachycardia) ([facility] [I47.1]  Pulmonary embolism, unspecified chronicity, unspecified pulmonary embolism type, unspecified whether acute cor pulmonale present ([address] [I26.99]  Edema, unspecified type [R60.9]  SOB (shortness of breath) [R06.02]      Procedure:   Laparoscopic suzi-en-Y gastric bypass (-22 modifier due to patient frailty, severe hepatomegaly and visceral adiposity, increasing the complexity and technical challenge of the procedure)    Postop Complications: none    Hospital Course:  Patient was admitted on 1/11/2023 for scheduled bariatric surgery. Operation was without significant complication. Patient admitted to the floor postoperatively, monitored as per protocol. Diet sequentially advanced beginning POD 1, pain medications transitioned to oral during the hospital course.  Currently the patient is afebrile, vital signs stable, tolerating a clear liquid diet with protein supplementation, voiding spontaneously, ambulatory with adequate pain control with oral medications and clear surgical sites without evidence of infection. Discharge Diet:  Clear Liquid Bariatric Diet for 7 days, then soft moist protein diet for 5 weeks    RESTART ELIQUIS TOMORROW (1/13/2023)    Discharge Medications:  Current Discharge Medication List        START taking these medications    Details   omeprazole (PRILOSEC) 40 mg capsule Take 1 Capsule by mouth daily. Indications: gastroesophageal reflux disease  Qty: 90 Capsule, Refills: 0  Start date: 1/12/2023           CONTINUE these medications which have CHANGED    Details   insulin glargine U-300 conc 300 unit/mL (3 mL) inpn 150 Units by SubCUTAneous route nightly. Indications: take 50 units of lantus daily and report blood glucose to your PCP  Qty: 3 mL, Refills: 0  Start date: 1/12/2023      insulin lispro (HUMALOG) 100 unit/mL injection INITIATE INSULIN CORRECTIVE PROTOCOL: Normal Insulin Sensitivity   For Blood Sugar (mg/dL) of:     Less than 150 =   0 units           150 -199 =   2 units  200 -249 =   4 units  250 -299 =   6 units  300 -349 =   8 units  350 and above = 10 units and Call Physician  If 2 glucose readings are above 200 mg/dL within a 24 hr period, proceed to \"Insulin Resistant\" dosing. Initiate Hypoglycemia protocol if blood glucose is <70 mg/dL  Fast Acting - Administer Immediately - or within 15 minutes of start of meal, if mealtime coverage. Qty: 1 Each, Refills: 0  Start date: 1/12/2023      losartan (COZAAR) 25 mg tablet Take 1 Tablet by mouth daily. Indications: high blood pressure  Qty: 30 Tablet, Refills: 0  Start date: 1/12/2023           CONTINUE these medications which have NOT CHANGED    Details   Gemtesa 75 mg tablet Take 75 mg by mouth daily. promethazine (PHENERGAN) 12.5 mg tablet Take  by mouth every six (6) hours as needed for Nausea.       latanoprost (XALATAN) 0.005 % ophthalmic solution       hyoscyamine (LEVSIN) 0.125 mg/5 mL elixir       apixaban (ELIQUIS) 5 mg tablet Take 5 mg by mouth two (2) times a day. dicyclomine (BENTYL) 20 mg tablet Take  by mouth two (2) times a day. pregabalin (LYRICA) 150 mg capsule Take  by mouth daily. ondansetron (ZOFRAN ODT) 4 mg disintegrating tablet Take 1 Tab by mouth every eight (8) hours as needed for Nausea. Qty: 12 Tab, Refills: 0      HYDROmorphone (DILAUDID) 4 mg tablet Take 4 mg by mouth every six (6) hours as needed for Pain. amLODIPine (NORVASC) 10 mg tablet Take 10 mg by mouth daily. montelukast (SINGULAIR) 10 mg tablet Take 10 mg by mouth daily. albuterol (PROVENTIL HFA, VENTOLIN HFA, PROAIR HFA) 90 mcg/actuation inhaler Take 2 Puffs by inhalation every four (4) hours as needed for Wheezing. Cholecalciferol, Vitamin D3, 50,000 unit cap Take  by mouth every Monday. rosuvastatin (CRESTOR) 40 mg tablet Take 40 mg by mouth daily. NON FORMULARY       isosorbide mononitrate ER (IMDUR) 30 mg tablet Take 1 Tab by mouth daily.   Qty: 30 Tab, Refills: 6           STOP taking these medications       dulaglutide (Trulicity) 5.17 FS/7.6 mL sub-q pen Comments:   Reason for Stopping:         pantoprazole (PROTONIX) 40 mg tablet Comments:   Reason for Stopping:         furosemide (LASIX) 20 mg tablet Comments:   Reason for Stopping:         enoxaparin (Lovenox) 40 mg/0.4 mL Comments:   Reason for Stopping:         erythromycin (EDNA-TAB) 333 mg tablet Comments:   Reason for Stopping:                 Bariatric Chewable vitamins, 2 orally daily for life  Calcium Citrate 1500 mg orally daily for life  Vitamin B12 1000 micrograms sublingual daily for life  Vitamin D3 5,000 units orally daily for life   Vitamin B1 100 mg orally daily for life    Discharge disposition: home    Discharge condition: stable      Local wound care with daily showers, keep wounds clean and dry     Activity: as desired, no lifting, pushing, or pulling greater than 15lbs or situps for 30 days     Special Instructions:            - No driving until activity is not influenced by incisional pain and off narcotics            - No bath or hot tub until wounds are healed            - Check pulse and temperature twice daily for 10 days            - Notify [facility] for a Temp >100.5 or Pulse>115     Follow-up with your surgeon in 2 weeks, call office for appointment [phone] ([facility]) [facility] [facility])

## 2023-01-12 NOTE — ROUTINE PROCESS
0496- Patient admitted from PACU via stretcher. Transferred to bed. Oriented to room. Head of bed elevated 30 degrees. Patient wearing oxygen at 2 lpm via nasal cannula. New purewick placed and attached to low suction. LR at 125 ml/hr started via IV pump. Ice pack to abdomen. 5 lap sites to abdomen with dermabond. No drainage noted from lap sites. Call bell in reach. Bed alarm placed on bed. [phone]- Patient complaining of sever pain, 9/10 to abdomen. Dilaudid 1 mg given slow IV push for pain. 1617- patient complaining of nausea. Emesis bag given. Zofran 4 mg given IV push for nausea. Patient did not vomit.

## 2023-01-12 NOTE — PROGRESS NOTES
Problem: Self Care Deficits Care Plan (Adult)  Goal: *Acute Goals and Plan of Care (Insert Text)  Description: Occupational Therapy Goals  Initiated 1/12/2023 within 7 day(s). 1.  Patient will perform bed mobility for ADLs with supervision/set-up. 2.  Patient will perform lower body dressing with supervision/set-up using AE prn.  3.  Patient will perform bathing with supervision/set-up using AE prn.  4.  Patient will perform BSC transfers with supervision/set-up. 5.  Patient will perform all aspects of toileting with supervision/set-up. 6.  Patient will participate in upper extremity therapeutic exercise/activities with supervision/set-up for 8 minutes to improve endurance and UB strength needed for ADLs    7. Patient will utilize energy conservation techniques during functional activities with verbal cues. Prior Level of Function: Pt lives with daughter and son in law. Pt uses w/c for functional mobility, independent with txfr to w/c and to [facility] for ADLs. Performs sponge baths with basin due to narrow entrance to the BR.   Outcome: Progressing Towards Goal  OCCUPATIONAL THERAPY EVALUATION    Patient: Didi Gomez (88 y.o. female)  Date: 1/12/2023  Primary Diagnosis: Morbid obesity ([facility] [E66.01]  Diabetic gastroparesis ([facility] [E11.43, K31.84]  Essential hypertension [I10]  Hyperlipidemia, unspecified hyperlipidemia type [E78.5]  Coronary artery disease, unspecified vessel or lesion type, unspecified whether angina present, unspecified whether native or transplanted heart [I25.10]  PSVT (paroxysmal supraventricular tachycardia) ([facility] [I47.1]  Pulmonary embolism, unspecified chronicity, unspecified pulmonary embolism type, unspecified whether acute cor pulmonale present ([facility] [I26.99]  Edema, unspecified type [R60.9]  SOB (shortness of breath) [R06.02]  Morbid obesity due to excess calories ([facility] [E66.01]  Procedure(s) (LRB):  LAPAROSCOPIC KEYLA-EN-Y GASTRIC BYPASS (N/A) 1 Day Post-Op Precautions:   Contact    ASSESSMENT :  Pt is motivated to participate in OT this am, requesting to maneuver to EOB for ADLs. Based on the objective data described below, the patient presents with decreased endurance and functional activity tolerance, generalized weakness, decreased functional mobility and standing balance, limiting pt's participation and independence with ADLs. Pt reports chronic difficulty with LB dressing, however, increased difficulty after the surgery due to pain in surgical site with all bending fwd activities. PT joined the session in preparation for functional txfrs, Pt required Min A to std and to txfr to the recliner, simulating [facility] txfr, benefiting from 2nd person for safety as pt has not been getting up since her surgery. Pt left comfortable in recliner, all needs met. Patient will benefit from skilled intervention to address the above impairments.   Patient's rehabilitation potential is considered to be Fair  Factors which may influence rehabilitation potential include:   []             None noted  []             Mental ability/status  [x]             Medical condition  []             Home/family situation and support systems  []             Safety awareness  [x]             Pain tolerance/management  []             Other:      PLAN :  Recommendations and Planned Interventions:   [x]               Self Care Training                  [x]      Therapeutic Activities  [x]               Functional Mobility Training   []      Cognitive Retraining  [x]               Therapeutic Exercises           [x]      Endurance Activities  [x]               Balance Training                    [x]      Neuromuscular Re-Education  []               Visual/Perceptual Training     [x]      Home Safety Training  [x]               Patient Education                   [x]      Family Training/Education  []               Other (comment):    Frequency/Duration: Patient will be followed by occupational therapy 1-2 times per day/3-5 days per week to address goals. Further Equipment Recommendations for Discharge: N/A    AMPAC: Current research shows that an AM-PAC score of 18 or greater is associated with a discharge to the patient's home setting. Based on an AM-PAC score of 18/24 and their current ADL deficits; it is recommended that the patient have 2-3 sessions per week of Occupational Therapy at d/c to increase the patient's independence. This AMPAC score should be considered in conjunction with interdisciplinary team recommendations to determine the most appropriate discharge setting. Patient's social support, diagnosis, medical stability, and prior level of function should also be taken into consideration. SUBJECTIVE:   Patient stated I want to get up.     OBJECTIVE DATA SUMMARY:     Past Medical History:   Diagnosis Date    Asthma     CAD (coronary artery disease) 2005    insignif = 20% les mid LAD    Chronic pain 2005    Colitis     Colitis     Diabetes ([address]     Diabetes mellitus with renal complications ([address] 0596    Diabetic gastroparesis ([address]     DJD (degenerative joint disease), multiple sites     GERD (gastroesophageal reflux disease)     Heel spur 2007    left    HTN (hypertension)     Hyperlipemia     Hyperlipidemia     Hypertension     Liver disease 2005    Morbid obesity ([address]     Osteoarthritis     PSVT (paroxysmal supraventricular tachycardia) ([address] 2006    Pulmonary embolism (HCC)     RAD (reactive airway disease)     Sleep apnea 2007    CPAP    Vitamin D deficiency      Past Surgical History:   Procedure Laterality Date    COLONOSCOPY N/A 2/13/2020    DIAGNOSTIC COLONOSCOPY /c Hot Snared Polypectomy performed by Simona Molina MD at [address] [address]      HX CHOLECYSTECTOMY  2018    lap    HX SHOULDER ARTHROSCOPY  2008    left decompression    HX TUBAL LIGATION  1982    lap    MO UNLISTED PROCEDURE LEG/ANKLE      benign cyst left ankle Barriers to Learning/Limitations: None  Compensate with: visual, verbal, tactile, kinesthetic cues/model    Home Situation:   Home Situation  Home Environment: Private residence  # Steps to Enter:  (ramp)  Wheelchair Ramp: Yes  One/Two Story Residence: Two story, live on 1st floor  Living Alone: No  Support Systems: Child(audrey), Other Family Member(s)  Patient Expects to be Discharged to[de-identified] Home  Current DME Used/Available at Home: Wheelchair, Shower chair, Walker, rollator, Commode, bedside, Oxygen, portable  Tub or Shower Type: Other (comment) (pt washes up with basin)  []  Right hand dominant   []  Left hand dominant    Cognitive/Behavioral Status:  Neurologic State: Alert  Orientation Level: Oriented X4  Cognition: Follows commands  Safety/Judgement: Awareness of environment; Fall prevention    Skin: visible skin intact  Edema: none noted    Vision/Perceptual:       Acuity: Able to read clock/calendar on wall without difficulty         Coordination: BUE  Coordination: Generally decreased, functional  Fine Motor Skills-Upper: Left Intact; Right Intact    Gross Motor Skills-Upper: Left Intact; Right Intact    Balance:  Sitting: Intact  Standing: Impaired; With support  Standing - Static: Fair  Standing - Dynamic : Fair    Strength: BUE    Strength: Generally decreased, functional   Tone & Sensation: BUE    Tone: Normal  Sensation: Intact     Range of Motion: BUE    AROM: Generally decreased, functional   Functional Mobility and Transfers for ADLs:  Bed Mobility:     Supine to Sit: Minimum assistance     Scooting: Contact guard assistance  Transfers:  Sit to Stand: Minimum assistance  Stand to Sit: Minimum assistance  Stand Pivot Transfers: Minimum assistance   Toilet Transfer : Minimum assistance      ADL Assessment:   Feeding: Setup    Oral Facial Hygiene/Grooming: Setup    Bathing: Minimum assistance    Upper Body Dressing: Supervision    Lower Body Dressing: Moderate assistance    Toileting:  Moderate assistance ADL Intervention:   Cognitive Retraining  Safety/Judgement: Awareness of environment; Fall prevention  Pain:  Pain level pre-treatment: not rated  Pain level post-treatment: not rated    Activity Tolerance:   Fair  Please refer to the flowsheet for vital signs taken during this treatment. After treatment:   [x] Patient left in no apparent distress sitting up in chair  [] Patient left in no apparent distress in bed  [x] Call bell left within reach  [x] Nursing notified  [x] RN present  [] Bed alarm activated    COMMUNICATION/EDUCATION:   [x] Role of Occupational Therapy in the acute care setting  [x] Home safety education was provided and the patient/caregiver indicated understanding. [x] Patient/family have participated as able in goal setting and plan of care. [] Patient/family agree to work toward stated goals and plan of care. [] Patient understands intent and goals of therapy, but is neutral about his/her participation. [] Patient is unable to participate in goal setting and plan of care. Thank you for this referral.  Allie Iyer, OTR/L  Time Calculation: 25 mins    Eval Complexity: History: LOW Complexity : Brief history review ; Examination: MEDIUM Complexity : 3-5 performance deficits relating to physical, cognitive , or psychosocial skils that result in activity limitations and / or participation restrictions; Decision Making:LOW Complexity : No comorbidities that affect functional and no verbal or physical assistance needed to complete eval tasks     [employer] AM-PAC® Daily Activity Inpatient Short Form (6-Clicks)*    How much HELP from another person does the patient currently need    (If the patient hasn't done an activity recently, how much help from another person do you think he/she would need if he/she tried?)   Total (Total A or Dep)   A Lot  (Mod to Max A)   A Little (Sup or Min A)   None (Mod I to I)   Putting on and taking off regular lower body clothing?    [] 1 [x] 2 [] 3 [] 4   2. Bathing (including washing, rinsing,      drying)? [] 1 [] 2 [x] 3 [] 4   3. Toileting, which includes using toilet, bedpan or urinal?   [] 1 [x] 2 [] 3 [] 4   4. Putting on and taking off regular upper body clothing? [] 1 [] 2 [x] 3 [] 4   5. Taking care of personal grooming such as brushing teeth? [] 1 [] 2 [] 3 [x] 4   6. Eating meals? [] 1 [] 2 [] 3 [x] 4       Current research shows that an AM-PAC score of 18 or greater is associated with a discharge to the patient's home setting. Based on an AM-PAC score of 18/24 and their current ADL deficits; it is recommended that the patient have 2-3 sessions per week of Occupational Therapy at d/c to increase the patient's independence.

## 2023-01-12 NOTE — PROGRESS NOTES
Problem: Laparoscopic Gastric Bypass:Day of Surgery  Goal: Activity/Safety  Outcome: Progressing Towards Goal  Goal: Consults, if ordered  Outcome: Progressing Towards Goal  Goal: Diagnostic Test/Procedures  Outcome: Progressing Towards Goal  Goal: Nutrition/Diet  Outcome: Progressing Towards Goal  Goal: Medications  Outcome: Progressing Towards Goal  Goal: Respiratory  Outcome: Progressing Towards Goal  Goal: Treatments/Interventions/Procedures  Outcome: Progressing Towards Goal  Goal: Psychosocial  Outcome: Progressing Towards Goal  Goal: *No signs and symptoms of infection or wound complications  Outcome: Progressing Towards Goal  Goal: *Optimal pain control at patient's stated goal  Outcome: Progressing Towards Goal  Goal: *Adequate urinary output (equal to or greater than 30 milliliters/hour)  Outcome: Progressing Towards Goal  Goal: *Hemodynamically stable  Outcome: Progressing Towards Goal  Goal: *Absence of signs and symptoms of DVT  Outcome: Progressing Towards Goal  Goal: *Labs within defined limits  Outcome: Progressing Towards Goal  Goal: *Oxygen saturation within defined limits  Outcome: Progressing Towards Goal

## 2023-01-12 NOTE — CONSULTS
Consult  Hospitalist Service    Patient: Debra Garcia MRN: [id number]   SSN: [id number]  YOB: 1950   Age: 81 y.o. Sex: female      Admit Date: 1/11/2023    LOS: 0 days   No chief complaint on file. Subjective:     81year old female patient with PMH of multiple subsegmental pulmonary emboli, IZABEL on CPAP, Asthma (mild intermittent), morbid obesity, chronic hypoxic respiratory failure on 2L NC at baseline. Hospitalist team being consulted for patient now s/p gastric sleeve for care of chronic medical conditions while IP at request of Dr. Jcarlos Barnes. Patient seen and examined in PACU. She states she is currently comfortable with pain well managed. She manages her own medications. Objective:     Vitals:  Visit Vitals  BP (!) 152/66 (BP 1 Location: Right upper arm, BP Patient Position: At rest;Semi fowlers)   Pulse 78   Temp 97.3 °F (36.3 °C)   Resp 20   Ht 5' (1.524 m)   Wt 142.4 kg (314 lb)   SpO2 95%   Breastfeeding No   BMI 61.32 kg/m²       Physical Exam:   General appearance: fatigued, cooperative, no distress, appears stated age, morbidly obese  Lungs: clear to auscultation bilaterally  Heart: regular rate and rhythm, S1, S2 normal, no murmur, click, rub or gallop  Abdomen: soft, nondistended. Pulses: 2+ and symmetric  Skin: Skin color, texture, turgor normal. No rashes or lesions  Neuro:  normal without focal findings  mental status, speech normal, alert and oriented x iii  DIONNE  reflexes normal and symmetric    Intake and Output:  Current Shift: No intake/output data recorded.   Last three shifts: 01/10 0701 - 01/11 1900  In: 1030 [I.V.:1030]  Out: -     Lab/Data Review:  Recent Results (from the past 12 hour(s))   TYPE & SCREEN    Collection Time: 01/11/23 11:43 AM   Result Value Ref Range    Crossmatch Expiration 01/14/2023,2359     ABO/Rh(D) Sandra Bath POSITIVE     Antibody screen NEG    GLUCOSE, POC    Collection Time: 01/11/23  2:07 PM   Result Value Ref Range    Glucose (POC) 162 (H) 70 - 110 mg/dL         Key Findings or tests:       Telemetry NONE   Oxygen NONE     Assessment and Plan: Morbid obesity now s/p gastric sleeve with Dr. Lia Gowers on 1/11. H/o of multiple subsegmental pulmonary emboli without acute cor pulmonale on Eliquis 5 mg BID   #2. Will hold Eliquis for now. Restart when cleared by surgery. Can likely restart tomorrow, discontinue post-op Lovenox before starting. Restrictive lung disease, mild intermittent asthma, chronic respiratory failure - on 2 L NC at baseline. #3. On only albuterol at home. No controller meds. Will order PRN duoneb. 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 HTN, essential - home medications restarted with holding perimeters. Close observation while on opioids, potential for hypotension. Debility, impaired mobility - patient uses wheelchair. Will consult Pt/ot. Hypokalemia - IV repletion, repeat CMP in AM      Diet NPO.  To be advanced by surgery    DVT Prophylax Lovenox    GI Prophylaxis Pantoprazole    Code status Full    Disposition TBD by surgery         Chandni Lim DO, hospitalist   January 11, 2023

## 2023-01-12 NOTE — ACP (ADVANCE CARE PLANNING)
Advance Care Planning   Advance Care Planning Inpatient Note  [facility]  Spiritual Care Department    Today's Date: 1/12/2023  [address]: [address]    Received request from . Upon review of chart and communication with care team, patient's decision making abilities are not in question. Patient was/were present in the room during visit. Goals of ACP Conversation:  Discuss Advance Care planning documents    Health Care Decision Makers:      Primary Decision Maker: Wesley Ty - Daughter - [phone]    Summary:  No Decision Maker named by patient at this time    Advance Care Planning Documents (Patient Wishes) on file:  None     Assessment:    Patient seen in bed 466 as a new admit to the hospital. Patient ws setting in a chair watching television when I arrived. There is no advance directive present according to the patient when asked.     Interventions:  Deferred conversation as patient not interested in completing an advance directive at this time    Care Preferences Communicated:  No    Outcomes/Plan:      [facility] on 1/12/2023 at 1:36 PM

## 2023-01-12 NOTE — PROGRESS NOTES
D/C order noted for today. Orders reviewed. No needs identified at this time. CM remains available if needed. PT/OT recommending Home Health. Currently pending Home Health orders. Dr. Chuy Hobbs notified.     VINAY Milner    [facility] Management [facility]

## 2023-01-12 NOTE — PROGRESS NOTES
Assessment/Transition Planning    Reason for Admission:  Morbid obesity ([facility] [E66.01]  Diabetic gastroparesis ([facility] [E11.43, K31.84]  Essential hypertension [I10]  Hyperlipidemia, unspecified hyperlipidemia type [E78.5]  Coronary artery disease, unspecified vessel or lesion type, unspecified whether angina present, unspecified whether native or transplanted heart [I25.10]  PSVT (paroxysmal supraventricular tachycardia) ([facility] [I47.1]  Pulmonary embolism, unspecified chronicity, unspecified pulmonary embolism type, unspecified whether acute cor pulmonale present ([facility] [I26.99]  Edema, unspecified type [R60.9]  SOB (shortness of breath) [R06.02]  Morbid obesity due to excess calories ([facility] [E66.01]                 RUR Score:    11            Plan for utilizing home health: Yes                      Likelihood of Readmission:   LOW                         Transition of Care Plan:              Initial assessment completed with patient. Cognitive status of patient: oriented to time, place, person and situation. Face sheet information confirmed:  yes. The patient designates Zahraa Lara (daughter) to participate in her discharge plan and to receive any needed information. This patient lives in a single family home with daughter. Patient is not able to navigate steps as needed. Prior to hospitalization, patient was considered to be independent with ADLs/IADLS : yes . Patient has a current ACP document on file: no      Healthcare Decision Maker:   Primary Decision Maker: Loretta Rosa - Daughter - [phone]    Click here to complete [address] including selection of the Healthcare Decision Maker Relationship (ie \"Primary\")    The daughter will be available to transport patient home upon discharge. The patient already has Rollator, W/C, Shower chair, BSC, and CPAP and Oxygen medical equipment available in the home. Patient is not currently active with home health.  Patient has not stayed in a skilled nursing facility or rehab. Was  stay within last 60 days : no. This patient is on dialysis :no    List of available Home Health agencies were provided and reviewed with the patient prior to discharge. Freedom of choice verbalized: yes, for [facility]. Currently, the discharge plan is Home with [address] [place] [address] Gurdeep Irizarry     The patient states that she can obtain her medications from the pharmacy, and take her medications as directed. Patient's current insurance is MEDICARE       Care Management Interventions  PCP Verified by CM: Yes  Palliative Care Criteria Met (RRAT>21 & CHF Dx)?: No  Mode of Transport at Discharge:  Other (see comment) (Family)  Transition of Care Consult (CM Consult): [address]: No  Reason Outside [place]: Patient already serviced by other home care/hospice agency  MyChart Signup: No  Discharge Durable Medical Equipment: No  Health Maintenance Reviewed: Yes  Physical Therapy Consult: Yes  Occupational Therapy Consult: Yes  Speech Therapy Consult: No  Support Systems: Child(audrey), Other Family Member(s)  Confirm Follow Up Transport: Family  The Patient and/or Patient Representative was Provided with a Choice of Provider and Agrees with the Discharge Plan?: Yes  Freedom of Choice List was Provided with Basic Dialogue that Supports the Patient's Individualized Plan of Care/Goals, Treatment Preferences and Shares the Quality Data Associated with the Providers?: Yes   Resource Information Provided?: No  Discharge Location  Patient Expects to be Discharged to[de-identified] Home with home health        VINAY Peguero    Care Management Group

## 2023-01-12 NOTE — PROGRESS NOTES
[facility]  Progress Note    Patient: Joe Mix Age: 81 y.o. : 1950 MR#: [id number] SSN: [id number]  Date/Time: 2023     C/C: Medicine consulted for management of hypertension diabetes and postoperative status      Subjective:   HPI : Patient is morbidly obese female history of diabetes longstanding use of insulin and hypertension with history of PE in the past, now s/p surgery    LAPAROSCOPIC KEYLA-EN-Y GASTRIC BYPASS           Review of Systems:  positive responses in bold type   Constitutional: Negative for fever, chills, diaphoresis and unexpected weight change. HENT: Negative for ear pain, congestion, sore throat, rhinorrhea, drooling, trouble swallowing, neck pain and tinnitus. Eyes: Negative for photophobia, pain, redness and visual disturbance. Respiratory: negative for shortness of breath, cough, choking, chest tightness, wheezing or stridor. Cardiovascular: Negative for chest pain, palpitations and leg swelling. Gastrointestinal: Negative for nausea, vomiting, abdominal pain, diarrhea, constipation, blood in stool, abdominal distention and anal bleeding. Genitourinary: Negative for dysuria, urgency, frequency, hematuria, flank pain and difficulty urinating. Musculoskeletal: Negative for back pain and arthralgias. Skin: Negative for color change, rash and wound. Neurological: Negative for dizziness, seizures, syncope, speech difficulty, light-headedness or headaches. Hematological: Does not bruise/bleed easily. Psychiatric/Behavioral: Negative for suicidal ideas, hallucinations, behavioral problems, self-injury or agitation     Assessment/Plan:     1.   Diabetes mellitus type 2-decrease Lantus insulin from 150 to 50 unit since patient is now barely eating, patient is requested to follow-up on her blood glucose level frequently, further added insulin will be informed on sliding scale which patient is very familiar, also patient will follow-up with her primary care doctor regarding further adjustment of her medications  2 hypertension-continue current treatment  3 history of PE-continue NOAC agent-starting beta-blocker surgery  4 morbid obesity-s/pLAPAROSCOPIC KEYLA-EN-Y GASTRIC BYPASS       Objective:       General:  Alert, cooperative, no acute distress   HEENT: No facial asymmetry, ALEXANDER Pravin, External ears - WNL    Cardiovascular: S1S2 - regular , No Murmur   Pulmonary: Equal expansion , No Use of accessory muscles , No Rales No Rhonchi    GI:  +BS in all four quadrants, soft, non-tender  Extremities:  No edema; 2+ dorsalis pedis pulses bilaterally  Neuro: Alert and oriented X 2. DVT Prophylaxis:  []Lovenox  []Hep SQ  []SCDs  []Coumadin   []On Heparin gtt    [] Eliquis [] Xarelto     Vitals:         VS: Visit Vitals  BP (!) 157/76 (BP 1 Location: Right upper arm, BP Patient Position: Semi fowlers; Lying)   Pulse 71   Temp 98.7 °F (37.1 °C)   Resp 20   Ht 5' (1.524 m)   Wt 142.4 kg (314 lb)   SpO2 97%   Breastfeeding No   BMI 61.32 kg/m²      Tmax/24hrs: Temp (24hrs), Av.9 °F (36.6 °C), Min:97.3 °F (36.3 °C), Max:98.7 °F (37.1 °C)        Medications:   Current Facility-Administered Medications   Medication Dose Route Frequency    scopolamine (TRANSDERM-SCOP) 1 mg over 3 days 1 Patch  1 Patch TransDERmal Q72H    amLODIPine (NORVASC) tablet 10 mg  10 mg Oral DAILY    albuterol (PROVENTIL VENTOLIN) nebulizer solution 2.5 mg  2.5 mg Nebulization Q4H PRN    naloxone (NARCAN) injection 0.4 mg  0.4 mg IntraVENous PRN    lactated Ringers infusion  125 mL/hr IntraVENous CONTINUOUS    acetaminophen (TYLENOL) tablet 650 mg  650 mg Oral Q6H    oxyCODONE IR (ROXICODONE) tablet 5 mg  5 mg Oral Q4H PRN    HYDROmorphone (DILAUDID) injection 1 mg  1 mg IntraVENous Q4H PRN    enoxaparin (LOVENOX) injection 40 mg  40 mg SubCUTAneous Q24H    ondansetron (ZOFRAN) injection 4 mg  4 mg IntraVENous Q6H PRN    hyoscyamine SL (LEVSIN/SL) tablet 0.125 mg 0.125 mg SubLINGual Q6H PRN    diphenhydrAMINE (BENADRYL) injection 25 mg  25 mg IntraVENous Q4H PRN    pantoprazole (PROTONIX) 40 mg in 0.9% sodium chloride 10 mL injection  40 mg IntraVENous DAILY    [Held by provider] isosorbide mononitrate ER (IMDUR) tablet 30 mg  30 mg Oral DAILY    [Held by provider] losartan (COZAAR) tablet 25 mg  25 mg Oral DAILY    montelukast (SINGULAIR) tablet 10 mg  10 mg Oral DAILY    pregabalin (LYRICA) capsule 150 mg  150 mg Oral BID    rosuvastatin (CRESTOR) tablet 40 mg  40 mg Oral DAILY    insulin lispro (HUMALOG) injection   SubCUTAneous AC&HS    glucose chewable tablet 16 g  4 Tablet Oral PRN    glucagon (GLUCAGEN) injection 1 mg  1 mg IntraMUSCular PRN    dextrose 10% infusion 0-250 mL  0-250 mL IntraVENous PRN    albuterol-ipratropium (DUO-NEB) 2.5 MG-0.5 MG/3 ML  3 mL Nebulization Q4H PRN       Labs:    Recent Labs     01/12/23  0832   WBC 8.0   HGB 12.6   HCT 39.1        Recent Labs     01/12/23  0832 01/11/23  0922     --    K 3.6 3.2*     --    CO2 31  --    *  --    BUN 13  --    CREA 1.19  --    CA 9.4  --    ALB 2.8*  --    ALT 48  --          Time spent on direct patient care >30 mints     Complexity : High complex - due to multiple medical issues outlined above. CODE Status :     Case discussed with:  [x]Patient  [] Family  [x]Nursing  []Case Management         Disclaimer: Sections of this note are dictated utilizing voice recognition software, which may have resulted in some phonetic based errors in grammar and contents. Even though attempts were made to correct all the mistakes, some may have been missed, and remained in the body of the document. If questions arise, please contact our department.     Signed By: Moustapha Aguilera MD     January 12, 2023

## 2023-01-12 NOTE — PROGRESS NOTES
Problem: Mobility Impaired (Adult and Pediatric)  Goal: *Acute Goals and Plan of Care (Insert Text)  Description: Physical Therapy Goals  Initiated 1/12/2023 and to be accomplished within 7 day(s)  1. Patient will move from supine to sit and sit to supine , scoot up and down, and roll side to side in bed with modified independence. 2.  Patient will transfer from bed to chair and chair to bed with modified independence using the least restrictive device. 3.  Patient will perform sit to stand with modified independence. 4.  Patient will participate in LE exercise to tolerance. PLOF: Pt lives with family in 2 story house with first floor set up and ramp to enter. Pt transferring to wheelchair via stand step transfers independently. Reports no recent falls, does not ambulate. Outcome: Progressing Towards Goal   PHYSICAL THERAPY EVALUATION    Patient: Wandy Aguilar (79 y.o. female)  Date: 1/12/2023  Primary Diagnosis: Morbid obesity ([facility] [E66.01]  Diabetic gastroparesis ([address] [E11.43, K31.84]  Essential hypertension [I10]  Hyperlipidemia, unspecified hyperlipidemia type [E78.5]  Coronary artery disease, unspecified vessel or lesion type, unspecified whether angina present, unspecified whether native or transplanted heart [I25.10]  PSVT (paroxysmal supraventricular tachycardia) ([address] [I47.1]  Pulmonary embolism, unspecified chronicity, unspecified pulmonary embolism type, unspecified whether acute cor pulmonale present ([address] [I26.99]  Edema, unspecified type [R60.9]  SOB (shortness of breath) [R06.02]  Morbid obesity due to excess calories ([address] [E66.01]  Procedure(s) (LRB):  LAPAROSCOPIC KEYLA-EN-Y GASTRIC BYPASS (N/A) 1 Day Post-Op   Precautions:  Contact    ASSESSMENT :  Pt cleared to participate in PT session, pt received semi-reclined in bed and agreeable to therapy session. Completing with OT to maximize safety and mobility.  Based on the objective data described below, the patient presents with decreased endurance, decreased strength, decreased balance reactions, gait deviations, and decreased independence in functional mobility. Pt completing supine to sit with Francisco, sitting on EOB with good balance. Standing with Francisco and reporting LE weakness. Returned to sitting on EOB. 2nd stand with Francisco and Francisco for walking SPT to recliner next to bed. Able to stand for 3rd time with CGA and improved balance. Discussed working on Ankle pumps, LAQ, and seated marching while in chair. Pt positioned for comfort and educated to call for assist before getting up, pt verbalized understanding. Pt left with all needs met and call bell in reach. RN notified of position and participation. Patient will benefit from skilled intervention to address the above impairments. Patient's rehabilitation potential is considered to be Fair  Factors which may influence rehabilitation potential include:   [x]         None noted  []         Mental ability/status  []         Medical condition  []         Home/family situation and support systems  []         Safety awareness  []         Pain tolerance/management  []         Other:      PLAN :  Recommendations and Planned Interventions:   [x]           Bed Mobility Training             []    Neuromuscular Re-Education  [x]           Transfer Training                   []    Orthotic/Prosthetic Training  [x]           Gait Training                          []    Modalities  [x]           Therapeutic Exercises           []    Edema Management/Control  [x]           Therapeutic Activities            [x]    Family Training/Education  [x]           Patient Education  []           Other (comment):    Frequency/Duration: Patient will be followed by physical therapy 1-2 times per day/4-7 days per week to address goals.     Further Equipment Recommendations for Discharge: pt has wheelchair, [facility]:   Current research shows that an AM-PAC score of 14 without stairs or greater is associated with a discharge to the patient's home setting. Based on an AM-PAC score of 14/20 if omitting stairs and their current functional mobility deficits, it is recommended that the patient have 2-3 sessions per week of Physical Therapy at d/c to increase the patient's independence. This [facility] score should be considered in conjunction with interdisciplinary team recommendations to determine the most appropriate discharge setting. Patient's social support, diagnosis, medical stability, and prior level of function should also be taken into consideration. SUBJECTIVE:   Patient stated This is my first time getting up.     OBJECTIVE DATA SUMMARY:     Past Medical History:   Diagnosis Date    Asthma     CAD (coronary artery disease) 2005    insignif = 20% les mid LAD    Chronic pain 2005    Colitis     Colitis     Diabetes ([address]     Diabetes mellitus with renal complications ([address] 4511    Diabetic gastroparesis ([address]     DJD (degenerative joint disease), multiple sites     GERD (gastroesophageal reflux disease)     Heel spur 2007    left    HTN (hypertension)     Hyperlipemia     Hyperlipidemia     Hypertension     Liver disease 2005    Morbid obesity ([address]     Osteoarthritis     PSVT (paroxysmal supraventricular tachycardia) ([address] 2006    Pulmonary embolism (HCC)     RAD (reactive airway disease)     Sleep apnea 2007    CPAP    Vitamin D deficiency      Past Surgical History:   Procedure Laterality Date    COLONOSCOPY N/A 2/13/2020    DIAGNOSTIC COLONOSCOPY /c Hot Snared Polypectomy performed by Molly Lucio MD at [address] [place] [address]      HX CHOLECYSTECTOMY  2018    lap    HX SHOULDER ARTHROSCOPY  2008    left decompression    HX TUBAL LIGATION  1982    lap    [place] UNLISTED PROCEDURE LEG/ANKLE      benign cyst left ankle     Barriers to Learning/Limitations: None  Compensate with: N/A  Home Situation:  Home Situation  Home Environment: Private residence  # Steps to Enter:  (ramp)  Wheelchair Ramp: Yes  One/Two Story Residence: Two story, live on 1st floor  Living Alone: No  Support Systems: Child(audrey), Other Family Member(s)  Patient Expects to be Discharged to[de-identified] Home  Current DME Used/Available at Home: Wheelchair, Shower chair, Walker, rollator, Commode, bedside, Oxygen, portable  Critical Behavior:  Neurologic State: Alert  Orientation Level: Oriented X4  Cognition: Appropriate decision making  Safety/Judgement: Awareness of environment; Fall prevention  Psychosocial  Patient Behaviors: Calm; Cooperative  Purposeful Interaction: Yes  Pt Identified Daily Priority: Clinical issues (comment)  Caritas Process: Establish trust  Caring Interventions: Reassure        Skin Integrity: Incision (comment)  Skin Integumentary  Skin Integrity: Incision (comment)     Strength:    Strength: Generally decreased, functional    Tone & Sensation:   Tone: Normal    Sensation: Intact    Range Of Motion:  AROM: Generally decreased, functional    Posture:  Posture (WDL): Within defined limits     Functional Mobility:  Bed Mobility:     Supine to Sit: Minimum assistance     Scooting: Contact guard assistance  Transfers:  Sit to Stand: Minimum assistance  Stand to Sit: Minimum assistance  Stand Pivot Transfers: Minimum assistance       Balance:   Sitting: Intact  Standing: Impaired; With support  Standing - Static: Fair  Standing - Dynamic : Fair    Pain:  Pain level pre-treatment: 0/10   Pain level post-treatment: 0/10   Activity Tolerance:   Fair tolerance     Please refer to the flowsheet for vital signs taken during this treatment.   After treatment:   []         Patient left in no apparent distress sitting up in chair  [x]         Patient left in no apparent distress in bed  [x]         Call bell left within reach  [x]         Nursing notified  []         Caregiver present  []         Bed alarm activated  []         SCDs applied    COMMUNICATION/EDUCATION:   [x]         Role of Physical Therapy in the acute care setting. [x]         Fall prevention education was provided and the patient/caregiver indicated understanding. [x]         Patient/family have participated as able in goal setting and plan of care. [x]         Patient/family agree to work toward stated goals and plan of care. []         Patient understands intent and goals of therapy, but is neutral about his/her participation. []         Patient is unable to participate in goal setting/plan of care: ongoing with therapy staff.  []         Other: Thank you for this referral.  Nano Monae, PT   Time Calculation: 14 mins      Eval Complexity: History: MEDIUM  Complexity : 1-2 comorbidities / personal factors will impact the outcome/ POC Exam:LOW Complexity : 1-2 Standardized tests and measures addressing body structure, function, activity limitation and / or participation in recreation  Presentation: LOW Complexity : Stable, uncomplicated  Clinical Decision Making:Low Complexity low  Overall Complexity:LOW     [place] AM-PAC® Basic Mobility Inpatient Short Form (6-Clicks) Version 2    How much HELP from another person does the patient currently need    (If the patient hasn't done an activity recently, how much help from another person do you think he/she would need if he/she tried?)   Total (Total A or Dep)   A Lot  (Mod to Max A)   A Little (Sup or Min A)   None (Mod I to I)   Turning from your back to your side while in a flat bed without using bedrails? [] 1 [] 2 [x] 3 [] 4   2. Moving from lying on your back to sitting on the side of a flat bed without using bedrails? [] 1 [] 2 [x] 3 [] 4   3. Moving to and from a bed to a chair (including a wheelchair)? [] 1 [] 2 [x] 3 [] 4   4. Standing up from a chair using your arms (e.g., wheelchair, or bedside chair)? [] 1 [] 2 [x] 3 [] 4   5. Walking in hospital room? [] 1 [x] 2 [] 3 [] 4          +If stair climbing cannot be assessed, skip item #6.   Sum responses from items 1-5.

## 2023-01-12 NOTE — PROGRESS NOTES
Surgery Progress Note    1/12/2023    Admit Date: 1/11/2023    Subjective:     Pt reports abd pain today but is managed with current plan. Some nausea overnight but improved this morning, denies vomiting and has been tolerating ice chips. Has not been up out of bed overnight or this morning. Objective:     Blood pressure 126/69, pulse 79, temperature 98.3 °F (36.8 °C), resp. rate 19, height 5' (1.524 m), weight 142.4 kg (314 lb), SpO2 99 %, not currently breastfeeding. No intake/output data recorded.     01/10 1901 - 01/12 0700  In: 1030 [I.V.:1030]  Out: 300 [Urine:300]    EXAM: GENERAL: alert, pleasant, no distress   HEART: regular rate and rhythm   LUNGS: clear to auscultation   ABDOMEN:  Soft, obese, appropriate incisional tenderness, +BS, slight distention in upper abd, surgical incisions clean, dry, no erythema or drainage   EXTREMITIES: warm, well perfused    Data Review    Recent Results (from the past 24 hour(s))   GLUCOSE, POC    Collection Time: 01/11/23  9:05 AM   Result Value Ref Range    Glucose (POC) 131 (H) 70 - 110 mg/dL   POTASSIUM    Collection Time: 01/11/23  9:22 AM   Result Value Ref Range    Potassium 3.2 (L) 3.5 - 5.5 mmol/L   HEMOGLOBIN A1C W/O EAG    Collection Time: 01/11/23  9:22 AM   Result Value Ref Range    Hemoglobin A1c 5.8 (H) 4.2 - 5.6 %   TYPE & SCREEN    Collection Time: 01/11/23 11:43 AM   Result Value Ref Range    Crossmatch Expiration 01/14/2023,2359     ABO/Rh(D) Tanisha Zhanna POSITIVE     Antibody screen NEG    GLUCOSE, POC    Collection Time: 01/11/23  2:07 PM   Result Value Ref Range    Glucose (POC) 162 (H) 70 - 110 mg/dL   GLUCOSE, POC    Collection Time: 01/12/23 12:12 AM   Result Value Ref Range    Glucose (POC) 164 (H) 70 - 110 mg/dL   GLUCOSE, POC    Collection Time: 01/12/23  5:45 AM   Result Value Ref Range    Glucose (POC) 133 (H) 70 - 110 mg/dL       Assessment:   Dasha Min is a 81 y.o. female,  day 1 status post LAPAROSCOPIC KEYLA-EN-Y GASTRIC BYPASS   Condition: good    Plan:   -Start clear liquid bariatric diet with protein shake- bariatric drinking trial (4 oz per hr for 3 consecutive hours)  -Labs not drawn at time of visit, awaiting CBC, CMP, and A1c.   -Up in chair for drinking trial with assistance from lift team.  -PT/OT today as ordered by hospitalist.   -Lovenox while inpatient, can resume Eliquis day after discharge. -D/c Lasix ordered by hospitalist as pt just started PO fluids this morning.   -Continue current nausea and pain management plan. No PO pain meds will be given upon discharge as pt is in Pain Management. If labs reasonable, and pt meets goals (passes drinking trial, able to transfer to bedside commode with one assist, and pain/nausea managed) okay to d/c later today from surgical standpoint, but most likely will go home tomorrow.      Maribeth Brennan NP  8:44 AM  1/12/2023

## 2023-01-12 NOTE — PROGRESS NOTES
Discharge teaching completed at bedside with patient. Opportunity provided for clarifying questions. All answered to patient satisfaction. IV(2) removed. ID removed and shredded.

## 2023-01-12 NOTE — ACP (ADVANCE CARE PLANNING)
conducted an initial consultation and Spiritual Assessment for Bernice Jara, who is a 81 y.o.,female. Patients Primary Language is: [place]. According to the patients EMR Taoism Affiliation is: [place].     The reason the Patient came to the hospital is:   Patient Active Problem List    Diagnosis Date Noted    Morbid obesity due to excess calories ([facility] 01/11/2023    Edema 04/19/2022    Chronic pain of both shoulders 07/07/2021    Primary osteoarthritis of both knees 07/07/2021    Pulmonary embolism ([address] 04/17/2021    SOB (shortness of breath) 12/08/2020    Colitis 01/01/2020    Panniculitis 03/18/2019    Type II diabetes mellitus with complication, uncontrolled 11/25/2016    Preseptal cellulitis 63/99/8230    Uncomplicated severe persistent asthma 10/21/2015    Uncontrolled type 2 diabetes mellitus with hyperglycemia ([address] 07/29/2014    Right flank pain 06/24/2014    Diabetes mellitus with renal complications (HCC)     HTN (hypertension)     Hyperlipidemia     CAD (coronary artery disease)     RAD (reactive airway disease)     Diabetic gastroparesis (HCC)     PSVT (paroxysmal supraventricular tachycardia) (HCC)     Sleep apnea     Heel spur     GERD (gastroesophageal reflux disease)     DJD (degenerative joint disease), multiple sites     Morbid obesity ([address]     Vitamin D deficiency     Chronic pain of both knees 10/22/2007        The  provided the following Interventions:  Initiated a relationship of care and support support with patient in room 466. Listened empathically as she shared her story of being here and hopes for a short stay. There is no advance directive completed on chart. Provided chaplaincy education. Provided information about Spiritual Care Services. Offered prayer  on patients behalf. The following outcomes were achieved:  Patient shared limited information about her medical narrative and spiritual journey/beliefs.   Patient processed feeling about current hospitalization. Patient expressed gratitude for pastoral care visit. Assessment:  Patient does not have any Restorationist/cultural needs that will affect patients preferences in health care. There are no further spiritual or Restorationist issues which require Spiritual Care Services interventions at this time. Plan:  Chaplains will continue to follow and will provide pastoral care on an as needed/requested basis    . Joce Locke   Spiritual Care   [phone]

## 2023-01-13 ENCOUNTER — TELEPHONE (OUTPATIENT)
Dept: SURGERY | Age: 73
End: 2023-01-13

## 2023-01-13 NOTE — TELEPHONE ENCOUNTER
Curt from [address] [place] [address] Gurdeep Irizarry needs a verbal order to start the patient on 1/16/23. They want to know if the order can be signed.

## 2023-01-16 ENCOUNTER — TELEPHONE (OUTPATIENT)
Dept: BARIATRICS/WEIGHT MGMT | Age: 73
End: 2023-01-16

## 2023-01-16 NOTE — TELEPHONE ENCOUNTER
Pt is getting in 50 oz of fluid. Pt instructed that she needs to get in 64 oz of fluid. Pt verbalized understanding. Pt states that she is pumping her ankles and feet. Pt states that her urine is yellow.

## 2023-01-27 ENCOUNTER — OFFICE VISIT (OUTPATIENT)
Dept: SURGERY | Age: 73
End: 2023-01-27
Payer: MEDICARE

## 2023-01-27 VITALS
HEART RATE: 81 BPM | HEIGHT: 63 IN | OXYGEN SATURATION: 96 % | SYSTOLIC BLOOD PRESSURE: 171 MMHG | TEMPERATURE: 97.5 F | DIASTOLIC BLOOD PRESSURE: 78 MMHG | WEIGHT: 293 LBS | BODY MASS INDEX: 51.91 KG/M2

## 2023-01-27 DIAGNOSIS — E11.65 UNCONTROLLED TYPE 2 DIABETES MELLITUS WITH HYPERGLYCEMIA (HCC): ICD-10-CM

## 2023-01-27 DIAGNOSIS — Z98.84 BARIATRIC SURGERY STATUS: ICD-10-CM

## 2023-01-27 DIAGNOSIS — E66.01 MORBID OBESITY (HCC): Primary | ICD-10-CM

## 2023-01-27 DIAGNOSIS — N18.30 STAGE 3 CHRONIC KIDNEY DISEASE, UNSPECIFIED WHETHER STAGE 3A OR 3B CKD (HCC): ICD-10-CM

## 2023-01-27 DIAGNOSIS — G47.33 OBSTRUCTIVE SLEEP APNEA SYNDROME: ICD-10-CM

## 2023-01-27 DIAGNOSIS — I10 HYPERTENSION, UNSPECIFIED TYPE: ICD-10-CM

## 2023-01-27 DIAGNOSIS — Z13.21 MALNUTRITION SCREEN: ICD-10-CM

## 2023-01-27 DIAGNOSIS — M17.0 PRIMARY OSTEOARTHRITIS OF BOTH KNEES: ICD-10-CM

## 2023-01-27 DIAGNOSIS — I25.10 CORONARY ARTERY DISEASE, UNSPECIFIED VESSEL OR LESION TYPE, UNSPECIFIED WHETHER ANGINA PRESENT, UNSPECIFIED WHETHER NATIVE OR TRANSPLANTED HEART: ICD-10-CM

## 2023-01-27 DIAGNOSIS — K91.2 POSTSURGICAL MALABSORPTION: ICD-10-CM

## 2023-01-27 DIAGNOSIS — J45.50 UNCOMPLICATED SEVERE PERSISTENT ASTHMA: ICD-10-CM

## 2023-01-27 DIAGNOSIS — E78.5 HYPERLIPIDEMIA, UNSPECIFIED HYPERLIPIDEMIA TYPE: ICD-10-CM

## 2023-01-27 DIAGNOSIS — K21.9 GASTROESOPHAGEAL REFLUX DISEASE, UNSPECIFIED WHETHER ESOPHAGITIS PRESENT: ICD-10-CM

## 2023-01-27 RX ORDER — MULTIVIT-MIN/IRON/FOLIC ACID/K 45-800-120
CAPSULE ORAL
COMMUNITY

## 2023-01-27 NOTE — PROGRESS NOTES
Anabel Salazar is a 81 y.o. female (: 1950) presenting to address:    Chief Complaint   Patient presents with    Post OP Follow Up     LGBP 23       Medication list and allergies have been reviewed with Anabel Salazar and updated as of today's date. I have gone over all Medical, Surgical and Social History with Anabel Salazar and updated/added the information accordingly. 1. Have you been to the ER, Urgent Care or Hospitalized since your last visit? NO      2. Have you followed up with your PCP or any other Physicians since your procedure/ last office visit?    NO

## 2023-01-27 NOTE — PROGRESS NOTES
Bariatric Surgery Post Op Progress Note    CC: follow up LRYGB  Subjective:     Basilio Light  is a 81 y.o. female who presents for follow-up after LRYGB. Mikki Erickson She has lost a total of 29 pounds since surgery. Body mass index is 51.9 kg/m². Mikki Erickson Denies f/c/cp/sob/peripheral swelling    40+ g protein per day  64 oz of fluids per day    Nausea: No  Vomiting: No  Dysphagia: No  Reflux: No  Exercise: No, wheelchair bound  MVI: Yes, [facility]  Calcium citrate: Yes    1 tablets of opiate medication taken postop. Changes in their medical history and medications have been reviewed.     Restarted eliquis    Comorbid conditions: CKD, DM2, HTN, asthma, IZABEL, SOB, lymphedema    Patient Active Problem List   Diagnosis Code    Right flank pain R10.9    Diabetes mellitus with renal complications ([facility] I94.09    HTN (hypertension) I10    Hyperlipidemia E78.5    CAD (coronary artery disease) I25.10    RAD (reactive airway disease) J45.909    Diabetic gastroparesis ([facility]) E11.43, K31.84    PSVT (paroxysmal supraventricular tachycardia) ([facility]) I47.1    Sleep apnea G47.30    Heel spur M77.30    GERD (gastroesophageal reflux disease) K21.9    DJD (degenerative joint disease), multiple sites M15.9    Morbid obesity ([facility] E66.01    Vitamin D deficiency E55.9    Preseptal cellulitis L03.213    Type II diabetes mellitus with complication, uncontrolled [id number]    Colitis K52.9    Pulmonary embolism ([facility]) I26.99    Chronic pain of both knees M25.561, M25.562, G89.29    Chronic pain of both shoulders M25.511, G89.29, M25.512    Edema R60.9    Panniculitis M79.3    Primary osteoarthritis of both knees M17.0    SOB (shortness of breath) R97.57    Uncomplicated severe persistent asthma J45.50    Uncontrolled type 2 diabetes mellitus with hyperglycemia ([facility]) E11.65    Morbid obesity due to excess calories ([facility]) E66.01    Chronic renal disease, stage III N18.30    Bariatric surgery status Z98.84     Past Medical History:   Diagnosis Date    Asthma CAD (coronary artery disease) 2005    insignif = 20% les mid LAD    Chronic pain 2005    Colitis     Colitis     Diabetes ([facility]     Diabetes mellitus with renal complications ([facility] 6144    Diabetic gastroparesis ([facility]     DJD (degenerative joint disease), multiple sites     GERD (gastroesophageal reflux disease)     Heel spur 2007    left    HTN (hypertension)     Hyperlipemia     Hyperlipidemia     Hypertension     Liver disease 2005    Morbid obesity ([facility]     Osteoarthritis     PSVT (paroxysmal supraventricular tachycardia) ([facility] 2006    Pulmonary embolism (HCC)     RAD (reactive airway disease)     Sleep apnea 2007    CPAP    Vitamin D deficiency      Past Surgical History:   Procedure Laterality Date    COLONOSCOPY N/A 2/13/2020    DIAGNOSTIC COLONOSCOPY /c Hot Snared Polypectomy performed by Jaun Chen MD at [address] [place] [address]    HX BREAST REDUCTION      HX CHOLECYSTECTOMY  2018    lap    HX SHOULDER ARTHROSCOPY  2008    left decompression    HX TUBAL LIGATION  1982    lap    AL UNLISTED PROCEDURE LEG/ANKLE      benign cyst left ankle       Objective:   Physical Exam:  Visit Vitals  BP (!) 171/78 (BP 1 Location: Left upper arm, BP Patient Position: Sitting)   Pulse 81   Temp 97.5 °F (36.4 °C) (Temporal)   Ht 5' 3\" (1.6 m)   Wt 132.9 kg (293 lb)   SpO2 96%   BMI 51.90 kg/m²     General: No acute distress, conversant  Eyes: PERRLA, no scleral icterus  HENT: Normocephalic without oral lesions  Neck: Trachea midline without LAD  Cardiac: Normal pulse rate and rhythm  Pulmonary: Symmetric chest rise with normal effort  GI: Soft, NT, ND, no hernia, no splenomegaly  Skin: Warm without rash  Extremities: No edema or joint stiffness  Psych: Appropriate mood and affect    Assessment:     History of Morbid obesity, status post LRYGB    Plan:     Encouraged to adhere to the post operative diet and exercise program.  Advised lifelong vitamin supplementation.   Continue routine labs and follow ups.   Attend support group  Follow-up per protocol    Autumn Wick MD [facility]  Bariatric and General Surgeon  [facility]

## 2023-02-11 ENCOUNTER — TELEPHONE (OUTPATIENT)
Dept: SURGERY | Age: 73
End: 2023-02-11

## 2023-02-11 ENCOUNTER — HOSPITAL ENCOUNTER (EMERGENCY)
Facility: HOSPITAL | Age: 73
Discharge: HOME | End: 2023-02-11
Attending: EMERGENCY MEDICINE
Payer: MEDICARE

## 2023-02-11 ENCOUNTER — APPOINTMENT (OUTPATIENT)
Facility: HOSPITAL | Age: 73
End: 2023-02-11
Payer: MEDICARE

## 2023-02-11 VITALS
DIASTOLIC BLOOD PRESSURE: 75 MMHG | BODY MASS INDEX: 57.52 KG/M2 | HEIGHT: 60 IN | HEART RATE: 95 BPM | TEMPERATURE: 98.3 F | WEIGHT: 293 LBS | OXYGEN SATURATION: 95 % | RESPIRATION RATE: 18 BRPM | SYSTOLIC BLOOD PRESSURE: 151 MMHG

## 2023-02-11 DIAGNOSIS — R53.83 OTHER FATIGUE: Primary | ICD-10-CM

## 2023-02-11 LAB
ALBUMIN SERPL-MCNC: 3.3 G/DL (ref 3.4–5)
ALBUMIN/GLOB SERPL: 0.6 (ref 0.8–1.7)
ALP SERPL-CCNC: 142 U/L (ref 45–117)
ALT SERPL-CCNC: 69 U/L (ref 13–56)
ANION GAP SERPL CALC-SCNC: 5 MMOL/L (ref 3–18)
APPEARANCE UR: ABNORMAL
AST SERPL-CCNC: 103 U/L (ref 10–38)
BACTERIA URNS QL MICRO: ABNORMAL /HPF
BASOPHILS # BLD: 0 K/UL (ref 0–0.1)
BASOPHILS NFR BLD: 1 % (ref 0–2)
BILIRUB SERPL-MCNC: 0.3 MG/DL (ref 0.2–1)
BILIRUB UR QL: NEGATIVE
BUN SERPL-MCNC: 39 MG/DL (ref 7–18)
BUN/CREAT SERPL: 25 (ref 12–20)
CALCIUM SERPL-MCNC: 10.3 MG/DL (ref 8.5–10.1)
CHLORIDE SERPL-SCNC: 100 MMOL/L (ref 100–111)
CO2 SERPL-SCNC: 29 MMOL/L (ref 21–32)
COLOR UR: YELLOW
CREAT SERPL-MCNC: 1.58 MG/DL (ref 0.6–1.3)
DIFFERENTIAL METHOD BLD: ABNORMAL
EKG ATRIAL RATE: 89 BPM
EKG DIAGNOSIS: NORMAL
EKG P AXIS: 65 DEGREES
EKG P-R INTERVAL: 168 MS
EKG Q-T INTERVAL: 354 MS
EKG QRS DURATION: 90 MS
EKG QTC CALCULATION (BAZETT): 430 MS
EKG R AXIS: -17 DEGREES
EKG T AXIS: 57 DEGREES
EKG VENTRICULAR RATE: 89 BPM
EOSINOPHIL # BLD: 0 K/UL (ref 0–0.4)
EOSINOPHIL NFR BLD: 0 % (ref 0–5)
EPITH CASTS URNS QL MICRO: ABNORMAL /LPF (ref 0–5)
ERYTHROCYTE [DISTWIDTH] IN BLOOD BY AUTOMATED COUNT: 19.8 % (ref 11.6–14.5)
FLUAV RNA SPEC QL NAA+PROBE: NOT DETECTED
FLUBV RNA SPEC QL NAA+PROBE: NOT DETECTED
GLOBULIN SER CALC-MCNC: 5.4 G/DL (ref 2–4)
GLUCOSE SERPL-MCNC: 235 MG/DL (ref 74–99)
GLUCOSE UR STRIP.AUTO-MCNC: NEGATIVE MG/DL
HCT VFR BLD AUTO: 41.9 % (ref 35–45)
HGB BLD-MCNC: 13.4 G/DL (ref 12–16)
HGB UR QL STRIP: ABNORMAL
IMM GRANULOCYTES # BLD AUTO: 0 K/UL (ref 0–0.04)
IMM GRANULOCYTES NFR BLD AUTO: 1 % (ref 0–0.5)
KETONES UR QL STRIP.AUTO: 15 MG/DL
LACTATE BLD-SCNC: 1.24 MMOL/L (ref 0.4–2)
LEUKOCYTE ESTERASE UR QL STRIP.AUTO: ABNORMAL
LIPASE SERPL-CCNC: 153 U/L (ref 73–393)
LYMPHOCYTES # BLD: 0.7 K/UL (ref 0.9–3.6)
LYMPHOCYTES NFR BLD: 11 % (ref 21–52)
MCH RBC QN AUTO: 25.3 PG (ref 24–34)
MCHC RBC AUTO-ENTMCNC: 32 G/DL (ref 31–37)
MCV RBC AUTO: 79.1 FL (ref 78–100)
MONOCYTES # BLD: 0.4 K/UL (ref 0.05–1.2)
MONOCYTES NFR BLD: 6 % (ref 3–10)
NEUTS SEG # BLD: 5.5 K/UL (ref 1.8–8)
NEUTS SEG NFR BLD: 82 % (ref 40–73)
NITRITE UR QL STRIP.AUTO: NEGATIVE
NRBC # BLD: 0 K/UL (ref 0–0.01)
NRBC BLD-RTO: 0 PER 100 WBC
PH UR STRIP: 6 (ref 5–8)
PLATELET # BLD AUTO: 325 K/UL (ref 135–420)
PMV BLD AUTO: 11.1 FL (ref 9.2–11.8)
POTASSIUM SERPL-SCNC: 4.5 MMOL/L (ref 3.5–5.5)
PROT SERPL-MCNC: 8.7 G/DL (ref 6.4–8.2)
PROT UR STRIP-MCNC: 300 MG/DL
RBC # BLD AUTO: 5.3 M/UL (ref 4.2–5.3)
RBC #/AREA URNS HPF: ABNORMAL /HPF (ref 0–5)
SARS-COV-2 RNA RESP QL NAA+PROBE: NOT DETECTED
SODIUM SERPL-SCNC: 134 MMOL/L (ref 136–145)
SP GR UR REFRACTOMETRY: 1.01 (ref 1–1.03)
TROPONIN I SERPL HS-MCNC: 13 NG/L (ref 0–54)
UROBILINOGEN UR QL STRIP.AUTO: 0.2 EU/DL (ref 0.2–1)
WBC # BLD AUTO: 6.6 K/UL (ref 4.6–13.2)
WBC URNS QL MICRO: ABNORMAL /HPF (ref 0–4)

## 2023-02-11 PROCEDURE — 96365 THER/PROPH/DIAG IV INF INIT: CPT

## 2023-02-11 PROCEDURE — 6360000002 HC RX W HCPCS: Performed by: STUDENT IN AN ORGANIZED HEALTH CARE EDUCATION/TRAINING PROGRAM

## 2023-02-11 PROCEDURE — 83690 ASSAY OF LIPASE: CPT

## 2023-02-11 PROCEDURE — 80053 COMPREHEN METABOLIC PANEL: CPT

## 2023-02-11 PROCEDURE — 84484 ASSAY OF TROPONIN QUANT: CPT

## 2023-02-11 PROCEDURE — 87636 SARSCOV2 & INF A&B AMP PRB: CPT

## 2023-02-11 PROCEDURE — 2580000003 HC RX 258: Performed by: STUDENT IN AN ORGANIZED HEALTH CARE EDUCATION/TRAINING PROGRAM

## 2023-02-11 PROCEDURE — 85025 COMPLETE CBC W/AUTO DIFF WBC: CPT

## 2023-02-11 PROCEDURE — 83605 ASSAY OF LACTIC ACID: CPT

## 2023-02-11 PROCEDURE — 81001 URINALYSIS AUTO W/SCOPE: CPT

## 2023-02-11 PROCEDURE — 99284 EMERGENCY DEPT VISIT MOD MDM: CPT

## 2023-02-11 RX ORDER — CEPHALEXIN 500 MG/1
500 CAPSULE ORAL 2 TIMES DAILY
Qty: 14 CAPSULE | Refills: 0 | Status: SHIPPED | OUTPATIENT
Start: 2023-02-11

## 2023-02-11 RX ORDER — CEPHALEXIN 500 MG/1
500 CAPSULE ORAL 2 TIMES DAILY
Qty: 14 CAPSULE | Refills: 0 | Status: SHIPPED | OUTPATIENT
Start: 2023-02-11 | End: 2023-02-11 | Stop reason: SDUPTHER

## 2023-02-11 RX ORDER — 0.9 % SODIUM CHLORIDE 0.9 %
500 INTRAVENOUS SOLUTION INTRAVENOUS ONCE
Status: COMPLETED | OUTPATIENT
Start: 2023-02-11 | End: 2023-02-11

## 2023-02-11 RX ORDER — THIAMINE HYDROCHLORIDE 100 MG/ML
100 INJECTION, SOLUTION INTRAMUSCULAR; INTRAVENOUS ONCE
Status: DISCONTINUED | OUTPATIENT
Start: 2023-02-11 | End: 2023-02-11

## 2023-02-11 RX ADMIN — THIAMINE HYDROCHLORIDE 100 MG: 100 INJECTION, SOLUTION INTRAMUSCULAR; INTRAVENOUS at 19:31

## 2023-02-11 RX ADMIN — SODIUM CHLORIDE 500 ML: 9 INJECTION, SOLUTION INTRAVENOUS at 19:31

## 2023-02-11 ASSESSMENT — ENCOUNTER SYMPTOMS
SHORTNESS OF BREATH: 0
ABDOMINAL PAIN: 0
BACK PAIN: 0
SORE THROAT: 0
WHEEZING: 0
COUGH: 0
EYE PAIN: 0
NAUSEA: 0
VOMITING: 0
DIARRHEA: 0

## 2023-02-11 NOTE — TELEPHONE ENCOUNTER
Patient's daughter called with onset today  nausea, fatigue, drowsiness, and shakiness. Daughter reports no fever (97 degrees), no mental status changes other than drowsiness, no chest pain, no SOB, no abdominal pain, having normal BM, urinating until today without issue, no pain/burning with urination, using home blood pressure machine BP 87/56, , blood sugar 187. Taking amlodipine, eliquis, imdur, losartan 25mg, montelukast, omeprazole, MVIs, crestor, pregabalin. Due to abnormal vitals and systemic symptoms of nausea, tremulousness, and fatigue, I'm concerning about renal or cardiac pathology or dehydration vs less likely surgical complication. Advised to report to [facility] ER for evaluation (EKG, lab panel including CBC, CMP, possible CT C/A/P if abnormal labs or clinical findings in ER, any other indicated workup).

## 2023-02-11 NOTE — ED NOTES
Sent by gastric bypass surgeon (advised over the phone) to ED based upon at-home finding of LOW BP, HIGH HEART RATE AND FATIGUE, per daughter. Current vital signs reflect no abnormal findings at this time. Pt family states all vitals were higher at home (home wrist BP cuff). Pt with current feeling of nausea. Romayne Promise Verneta Seller  02/11/23 [id number]

## 2023-02-11 NOTE — ED TRIAGE NOTES
Pt reports feelings of nausea and fatigue, today at home wrist BP was 92B systolic, not hypotensive in triage. Hx gastric bypass on 1/11.

## 2023-02-12 NOTE — DISCHARGE INSTRUCTIONS
You were evaluated for generalized fatigue with nausea and vomiting that has since resolved. Today's evaluation demonstrates no emergent process. Management is with supportive care. Be sure to make sure that you continue to eat normal diet and continue to hydrate with plenty of fluids. Follow-up with your primary care provider and your surgeon for continued evaluation/management as needed. As discussed, you have been prescribed Keflex for what may be a UTI. However, in the absence of symptoms, you may avoid using this medication and wait until you develop signs of a urinary tract infection or if your fatigue returns. If you start the antibiotic regimen, be sure to complete the entire regimen even if you are feeling better. Return to the emergency department if you develop worsening abdominal pain, fatigue, dizziness/fainting, chest pain, shortness of breath, difficulty breathing, fevers, nausea/vomiting.

## 2023-02-12 NOTE — ED PROVIDER NOTES
EMERGENCY DEPARTMENT HISTORY AND PHYSICAL EXAM    9:03 PM      Date: 2/11/2023  Patient Name: Lenora Lynn    History of Presenting Illness     Chief Complaint   Patient presents with    Hypotension    Fatigue    Shaking       81-year-old female past medical history of pulmonary embolism on Eliquis, HLD, HTN, CAD, diabetes, sleep apnea status post gastric bypass approximately 1 month ago presenting at the behest of her surgeon secondary to initial complaints this morning of fatigue, shakes/tremors, nausea and measured hypotension at home. Upon presentation to the emergency department, patient reports most of her symptoms have resolved. She still complains of some fatigue. She denies significant abdominal pain, fevers, night sweats or chills, vomiting. Patient has not been eating/drinking as much the past several days and is concerned for some dehydration. Otherwise, remaining review of systems negative. PCP: Chad Madera MD    Current Facility-Administered Medications   Medication Dose Route Frequency Provider Last Rate Last Admin    iopamidol (ISOVUE-300) 61 % injection 60 mL  60 mL IntraVENous ONCE PRN Velasquez Keen MD        0.9 % sodium chloride bolus  500 mL IntraVENous Once Velasquez Keen .9 mL/hr at 02/11/23 1931 500 mL at 02/11/23 1931     Current Outpatient Medications   Medication Sig Dispense Refill    cephALEXin (KEFLEX) 500 MG capsule Take 1 capsule by mouth 2 times daily 14 capsule 0    albuterol sulfate HFA (PROVENTIL;VENTOLIN;PROAIR) 108 (90 Base) MCG/ACT inhaler Inhale 2 puffs into the lungs every 4 hours as needed      amLODIPine (NORVASC) 10 MG tablet Take 10 mg by mouth daily      apixaban (ELIQUIS) 5 MG TABS tablet Take 5 mg by mouth 2 times daily      vitamin D (CHOLECALCIFEROL) 94756 UNIT CAPS Take by mouth      dicyclomine (BENTYL) 20 MG tablet Take by mouth 2 times daily      Dulaglutide (TRULICITY) 2.13 TT/0.2CP SOPN as directed.       erythromycin (KIM-TAB) 333 MG EC tablet Take 333 mg by mouth 2 times daily      furosemide (LASIX) 20 MG tablet Take by mouth daily      HYDROmorphone (DILAUDID) 4 MG tablet Take 4 mg by mouth every 6 hours as needed. hyoscyamine (LEVSIN) 125 MCG/5ML ELIX elixir ceived the following from [facility]. [facility] - [facility]: Outside name: hyoscyamine (LEVSIN) 0.125 mg/5 mL elixir      Insulin Glargine, 2 Unit Dial, 300 UNIT/ML SOPN Inject 150 Units into the skin      insulin lispro (HUMALOG) 100 UNIT/ML SOLN injection vial Inject into the skin      isosorbide mononitrate (IMDUR) 30 MG extended release tablet Take 30 mg by mouth daily      latanoprost (XALATAN) 0.005 % ophthalmic solution ceived the following from [facility]: Outside name: latanoprost (XALATAN) 0.005 % ophthalmic solution      losartan (COZAAR) 100 MG tablet Take 25 mg by mouth daily      montelukast (SINGULAIR) 10 MG tablet Take 10 mg by mouth daily      ondansetron (ZOFRAN-ODT) 4 MG disintegrating tablet Take 4 mg by mouth every 8 hours as needed      pantoprazole (PROTONIX) 40 MG tablet Take 40 mg by mouth daily      pregabalin (LYRICA) 150 MG capsule Take by mouth daily.       promethazine (PHENERGAN) 12.5 MG tablet Take by mouth every 6 hours as needed      rosuvastatin (CRESTOR) 40 MG tablet Take 40 mg by mouth daily      Vibegron (GEMTESA) 75 MG TABS Take 75 mg by mouth daily         Past History     Past Medical History:  Past Medical History:   Diagnosis Date    Asthma     CAD (coronary artery disease) 2005    insignif = 20% les mid LAD    Chronic pain 2005    Colitis     Colitis     Diabetes [address] [address]     Diabetes mellitus with renal complications ([facility] [address] 1303    Diabetic gastroparesis ([facility] [address]     DJD (degenerative joint disease), multiple sites     GERD (gastroesophageal reflux disease)     Heel spur 2007    left    HTN (hypertension)     Hyperlipemia     Hyperlipidemia     Hypertension     Liver disease 2005    Morbid obesity ([facility] [address] Osteoarthritis     PSVT (paroxysmal supraventricular tachycardia) ([facility] [address] 2006    Pulmonary embolism (HCC)     RAD (reactive airway disease)     Sleep apnea 2007    CPAP    Vitamin D deficiency        Past Surgical History:  Past Surgical History:   Procedure Laterality Date    BREAST REDUCTION SURGERY  1985    BREAST REDUCTION SURGERY      CHOLECYSTECTOMY  2018    lap    COLONOSCOPY N/A 2/13/2020    DIAGNOSTIC COLONOSCOPY /c Hot Snared Polypectomy performed by Syd Saeed MD at [facility] 45 UNLISTED      benign cyst left ankle    SHOULDER ARTHROSCOPY  2008    left decompression    TUBAL LIGATION  1982    lap       Family History:  Family History   Problem Relation Age of Onset    Diabetes Brother     Hypertension Sister     Diabetes Sister     Diabetes Sister     Cancer Brother         throat    Hypertension Sister        Social History:  Social History     Tobacco Use    Smoking status: Never    Smokeless tobacco: Never   Substance Use Topics    Alcohol use: No    Drug use: No       Allergies: Allergies   Allergen Reactions    Shellfish Allergy Anaphylaxis    Metformin Diarrhea    Metoclopramide Other (See Comments)     jittery    Tramadol Other (See Comments)     No help for DJD pain and upset stomach         Review of Systems       Review of Systems   Constitutional:  Positive for fatigue. Negative for chills, diaphoresis and fever. HENT:  Negative for ear pain, hearing loss, nosebleeds and sore throat. Eyes:  Negative for pain and visual disturbance. Respiratory:  Negative for cough, shortness of breath and wheezing. Cardiovascular:  Negative for chest pain and palpitations. Gastrointestinal:  Negative for abdominal pain, diarrhea, nausea and vomiting. Genitourinary:  Negative for dysuria and hematuria. Musculoskeletal:  Negative for back pain and neck pain. Skin:  Negative for wound. Neurological:  Negative for dizziness, syncope, weakness and headaches. Physical Exam   BP (!) 151/75   Pulse 95   Temp 98.3 °F (36.8 °C) (Oral)   Resp 18   Ht 5' (1.524 m)   Wt 293 lb (132.9 kg)   SpO2 95%   BMI 57.22 kg/m²       Physical Exam  Vitals and nursing note reviewed. Constitutional:       General: She is not in acute distress. Appearance: Normal appearance. She is obese. She is not ill-appearing, toxic-appearing or diaphoretic. HENT:      Head: Normocephalic and atraumatic. Mouth/Throat:      Mouth: Mucous membranes are moist.      Pharynx: Oropharynx is clear. Eyes:      Extraocular Movements: Extraocular movements intact. Pupils: Pupils are equal, round, and reactive to light. Cardiovascular:      Rate and Rhythm: Normal rate and regular rhythm. Pulses: Normal pulses. Heart sounds: Normal heart sounds. Pulmonary:      Effort: Pulmonary effort is normal. No respiratory distress. Breath sounds: Normal breath sounds. No wheezing or rales. Abdominal:      General: Abdomen is flat. Bowel sounds are normal.      Palpations: Abdomen is soft. Tenderness: There is no abdominal tenderness. There is no guarding or rebound. Comments: Well-healed surgical scars from gastric bypass surgery 1 month ago   Musculoskeletal:         General: No swelling or tenderness. Normal range of motion. Cervical back: Neck supple. Right lower leg: No edema. Left lower leg: No edema. Skin:     General: Skin is warm and dry. Capillary Refill: Capillary refill takes less than 2 seconds. Neurological:      General: No focal deficit present. Mental Status: She is alert and oriented to person, place, and time. Mental status is at baseline.          Diagnostic Study Results     Labs -  Recent Results (from the past 12 hour(s))   CBC with Auto Differential    Collection Time: 02/11/23  3:45 PM   Result Value Ref Range    WBC 6.6 4.6 - 13.2 K/uL    RBC 5.30 4.20 - 5.30 M/uL    Hemoglobin 13.4 12.0 - 16.0 g/dL Hematocrit 41.9 35.0 - 45.0 %    MCV 79.1 78.0 - 100.0 FL    MCH 25.3 24.0 - 34.0 PG    MCHC 32.0 31.0 - 37.0 g/dL    RDW 19.8 (H) 11.6 - 14.5 %    Platelets 420 490 - 504 K/uL    MPV 11.1 9.2 - 11.8 FL    Nucleated RBCs 0.0 0  WBC    nRBC 0.00 0.00 - 0.01 K/uL    Seg Neutrophils 82 (H) 40 - 73 %    Lymphocytes 11 (L) 21 - 52 %    Monocytes 6 3 - 10 %    Eosinophils % 0 0 - 5 %    Basophils 1 0 - 2 %    Immature Granulocytes 1 (H) 0.0 - 0.5 %    Segs Absolute 5.5 1.8 - 8.0 K/UL    Absolute Lymph # 0.7 (L) 0.9 - 3.6 K/UL    Absolute Mono # 0.4 0.05 - 1.2 K/UL    Absolute Eos # 0.0 0.0 - 0.4 K/UL    Basophils Absolute 0.0 0.0 - 0.1 K/UL    Absolute Immature Granulocyte 0.0 0.00 - 0.04 K/UL    Differential Type AUTOMATED     Comprehensive Metabolic Panel    Collection Time: 02/11/23  3:45 PM   Result Value Ref Range    Sodium 134 (L) 136 - 145 mmol/L    Potassium 4.5 3.5 - 5.5 mmol/L    Chloride 100 100 - 111 mmol/L    CO2 29 21 - 32 mmol/L    Anion Gap 5 3.0 - 18 mmol/L    Glucose 235 (H) 74 - 99 mg/dL    BUN 39 (H) 7.0 - 18 MG/DL    Creatinine 1.58 (H) 0.6 - 1.3 MG/DL    Bun/Cre Ratio 25 (H) 12 - 20      Est, Glom Filt Rate 35 (L) >60 ml/min/1.73m2    Calcium 10.3 (H) 8.5 - 10.1 MG/DL    Total Bilirubin 0.3 0.2 - 1.0 MG/DL    ALT 69 (H) 13 - 56 U/L     (H) 10 - 38 U/L    Alk Phosphatase 142 (H) 45 - 117 U/L    Total Protein 8.7 (H) 6.4 - 8.2 g/dL    Albumin 3.3 (L) 3.4 - 5.0 g/dL    Globulin 5.4 (H) 2.0 - 4.0 g/dL    Albumin/Globulin Ratio 0.6 (L) 0.8 - 1.7     Lipase    Collection Time: 02/11/23  3:45 PM   Result Value Ref Range    Lipase 153 73 - 393 U/L   Troponin    Collection Time: 02/11/23  3:45 PM   Result Value Ref Range    Troponin, High Sensitivity 13 0 - 54 ng/L   EKG 12 Lead    Collection Time: 02/11/23  3:56 PM   Result Value Ref Range    Ventricular Rate 89 BPM    Atrial Rate 89 BPM    P-R Interval 168 ms    QRS Duration 90 ms    Q-T Interval 354 ms    QTc Calculation (Bazett) 430 ms    P Axis 65 degrees    R Axis -17 degrees    T Axis 57 degrees    Diagnosis Normal sinus rhythm    POCT lactic acid (lactate)    Collection Time: 02/11/23  4:28 PM   Result Value Ref Range    POC Lactic Acid 1.24 0.40 - 2.00 mmol/L   Urinalysis    Collection Time: 02/11/23  5:00 PM   Result Value Ref Range    Color, UA YELLOW      Appearance CLOUDY      Specific [place], UA 1.013 1.005 - 1.030      pH, Urine 6.0 5.0 - 8.0      Protein,  (A) NEG mg/dL    Glucose, UA Negative NEG mg/dL    Ketones, Urine 15 (A) NEG mg/dL    Bilirubin Urine Negative NEG      Blood, Urine SMALL (A) NEG      Urobilinogen, Urine 0.2 0.2 - 1.0 EU/dL    Nitrite, Urine Negative NEG      Leukocyte Esterase, Urine MODERATE (A) NEG     Urinalysis, Micro    Collection Time: 02/11/23  5:00 PM   Result Value Ref Range    WBC, UA TOO NUMEROUS TO COUNT 0 - 4 /hpf    RBC, UA 0 to 3 0 - 5 /hpf    Epithelial Cells UA 3+ 0 - 5 /lpf    BACTERIA, URINE 4+ (A) NEG /hpf   COVID-19 & Influenza Combo    Collection Time: 02/11/23  7:40 PM    Specimen: Nasopharyngeal   Result Value Ref Range    SARS-CoV-2, PCR Not detected NOTD      Rapid Influenza A By PCR Not detected NOTD      Rapid Influenza B By PCR Not detected NOTD         Radiologic Studies -   No orders to display         Medical Decision Making   I am the first provider for this patient. I reviewed the vital signs, available nursing notes, past medical history, past surgical history, family history and social history. Vital Signs-Reviewed the patient's vital signs. EKG: On my review, normal sinus rhythm at 89 bpm.  No axis deviation. Appropriate intervals. No ST wave changes suggesting acute ischemic process    ED Course: Progress Notes, Reevaluation, and Consults:        ED Course as of 02/11/23 2103   Sat Feb 11, 2023   2009 No acute distress on my initial assessment. Patient reports that most of her symptoms have since resolved since this morning. She still has some mild fatigue. She reports that after discussion with her gastric bypass surgeon, Dr. Anuja Gastelum, she was instructed to report to the emergency department for further evaluation. [JUAN CARLOS]   2010 Physical exam relatively unremarkable. No abdominal tenderness. Surgical sites appear well-healed without any evidence of surrounding infection. [JUAN CARLOS]   2010 I discussed the case and work-up so far with Dr. Jair Corcoran. [JUAN CARLOS]   2010 At this time, there is less suspicion for a catastrophic intra-abdominal process secondary to the patient's surgery. Patient overall appears well. Lab work-up did demonstrate an elevated creatinine and BUN that may suggest prerenal/dehydration. Overall, there may be some dehydration. Surgery is amenable to refraining from a CT abdomen/pelvis at this time. I involved patient shared medical decision making and the patient is also amenable to this. [JUAN CARLOS]   2011 Troponin is within normal limits. EKG demonstrates no evidence of an acute ischemic process. I have lower suspicion for cardiovascular process at this time. Urinalysis demonstrates moderate leukocyte esterase and small blood. [JUAN CARLOS]   2012 No leukocytosis on CBC. CMP demonstrates mildly elevated liver labs. Lipase WNL. Otherwise unremarkable. [JUAN CARLOS]   2038 COVID and influenza tests are negative. [JUAN CARLOS]      ED Course User Index  [JUAN CARLOS] Aidan Marsh MD       Provider Notes (Medical Decision Making):   MDM    No acute distress on my initial assessment. Lab work-up and physical exam argue against an acute intra-abdominal process. Patient overall appears well. After discussion with her surgeon who sent her here, and after discussion with the patient, we have decided to refrain from CT abdomen/pelvis given patient's overall well appearance and no abdominal pain. Remaining work-up to include cardiac/sepsis demonstrates no evidence of MI/ACS. Patient does not appear septic. No leukocytosis and no elevated lactate. On reassessment, patient appears and feels well. Lab work-up relatively unremarkable. Patient has no complaints suggestive of a UTI. Moderate leukocyte esterase was noted on the urinalysis. No nitrites. High-power field urine shows many white blood cells, many bacteria. However it does appear to be contamination in the sample. Involved patient shared medical decision making regarding use of antibiotics. Given no complaints at this time, I recommended a prescription of antibiotics the patient can take if she did begin to develop symptoms or if her fatigue returns/worsens. Patient was amenable to this plan      Procedures        Diagnosis     Clinical Impression:   1. Other fatigue        Disposition: discharge    Disclaimer: Sections of this note are dictated using utilizing voice recognition software. Minor typographical errors may be present. If questions arise, please do not hesitate to contact me or call our department.          Taty Strauss MD  Resident  02/11/23 0169

## 2023-02-13 ENCOUNTER — TELEPHONE (OUTPATIENT)
Age: 73
End: 2023-02-13

## 2023-02-13 LAB
EKG ATRIAL RATE: 89 BPM
EKG DIAGNOSIS: NORMAL
EKG P AXIS: 65 DEGREES
EKG P-R INTERVAL: 168 MS
EKG Q-T INTERVAL: 354 MS
EKG QRS DURATION: 90 MS
EKG QTC CALCULATION (BAZETT): 430 MS
EKG R AXIS: -17 DEGREES
EKG T AXIS: 57 DEGREES
EKG VENTRICULAR RATE: 89 BPM

## 2023-02-13 NOTE — TELEPHONE ENCOUNTER
Spoke with pt to follow up after ER visit yesterday. She reports feeling much better than yesterday. Denies nausea and has been able to tolerate 1 oz of eggs this morning. She reports she has been struggling with getting in her fluids due to her overactive bladder and has not been taking the Gemtesa due to out of pocket costs. Discussed importance of getting in at least 64 oz fluids to prevent dehydration and to follow up with PCP regarding alternatives to [facility].

## 2023-02-21 ENCOUNTER — CLINICAL DOCUMENTATION (OUTPATIENT)
Facility: HOSPITAL | Age: 73
End: 2023-02-21

## 2023-02-21 NOTE — PROGRESS NOTES
[facility]  POST-OP NUTRITION FOLLOW UP    Patient's Name: Cyndee Jj  YOB: 1950  Surgery Date: 23      Procedure:  Gastric Bypass   Surgeon: Dr. Snider    Height: 5 f 3     Pre-Op Weight: 322     Current Weight: Unsure.  Patient is encouraged to weigh 1 x a week  Weight Lost:     BMI:    % EBW lost:    Attendance of support group:   When:   Why not:     Complications  Readmittance: None  Reoperations: None  Complications: None  IV Fluids: Yes  ER Trips: .  She states she was dizzy and nauseated.  She states she was told that she was dehydrated.    Problem Areas:   Nausea: None   Vomiting: None   Dumping Syndrome: None  Inadequate Protein: None, patient states she is getting a shake per day.  Inadequate Fluids: None,patient states she is at 80 ounces  Food Intolerance:   Hunger: None  Constipation: None    Eating 3 Meals/Day:Yes  Portion Size at Meals: 1 ounce     Protein from Food:     Foods being consumed:  Breakfast: Time:8 am  Eggs or yogurt  Lunch: Time: 12:30 pm:   White chicken, rotisserie chicken, deli turkey  Dinner:  Time: 5:30 pm:   Similar to lunch  In-between eating: None    Length of time for meals: 15 minutes    food/fluids:     Fluids: 64+ oz/day   Types of Fluids:    MVI: Pro Care    Number/Day: 1 x a day   Taken Separately:     Vitamin B1: included in MVI  Dosin mg    Calcium: Citrate pills crushed    Calcium Dosin mg    Taken Separately: Yes    Vitamin B12: included in MVI   Vitamin B12 Dosin mcg    Vitamin D: Yes     Vitamin D dosing: included in MVI    Iron: yes    Iron dosin mg     Protein Supplement: Fair Life     Grams of Protein: 30-60   Mixed with:      Splitting Protein Drink in /2:   Timing of Protein Drinks:   Patient is taking 7 days a week.    Exercise: Working with physical therapy 2 x a week due to bad knees.      Comments:    Patient is doing well at 6 weeks post op.  Her portions are  appropriate. She is focusing on protein at meals. She is taking her vitamins as instructed and getting 1-2 shakes per day. Overall doing very well. She is working to increase her activity, but is currently working with PT a few times a week. Patient was educated on the importance of eating meat and vegetables only. I have talked with patient about the effects of carbohydrates, not only from a weight management perspective, but also what effects it could have on their blood sugar and what reactive hypoglycemia is. Diet Follow Up:  9 month class scheduled for: Will schedule closer to the date.       Regi Ko [address] RD    2/21/2023

## 2023-04-17 LAB
A/G RATIO: 0.8 RATIO (ref 1.1–2.6)
ALBUMIN SERPL-MCNC: 3.6 G/DL (ref 3.5–5)
ALP BLD-CCNC: 80 U/L (ref 40–120)
ALT SERPL-CCNC: 32 U/L (ref 5–40)
ANION GAP SERPL CALCULATED.3IONS-SCNC: 13 MMOL/L (ref 3–15)
AST SERPL-CCNC: 34 U/L (ref 10–37)
BASOPHILS # BLD: 1 % (ref 0–2)
BASOPHILS ABSOLUTE: 0 K/UL (ref 0–0.2)
BILIRUB SERPL-MCNC: 0.3 MG/DL (ref 0.2–1.2)
BUN BLDV-MCNC: 27 MG/DL (ref 6–22)
CALCIUM SERPL-MCNC: 10.3 MG/DL (ref 8.4–10.5)
CHLORIDE BLD-SCNC: 99 MMOL/L (ref 98–110)
CO2: 27 MMOL/L (ref 20–32)
CREAT SERPL-MCNC: 1.3 MG/DL (ref 0.8–1.4)
EOSINOPHIL # BLD: 2 % (ref 0–6)
EOSINOPHILS ABSOLUTE: 0.1 K/UL (ref 0–0.5)
FOLATE: >20 NG/ML
GLOBULIN: 4.3 G/DL (ref 2–4)
GLOMERULAR FILTRATION RATE: 42.7 ML/MIN/1.73 SQ.M.
GLUCOSE: 88 MG/DL (ref 70–99)
HCT VFR BLD CALC: 36.3 % (ref 35.1–48.3)
HEMOGLOBIN: 11.9 G/DL (ref 11.7–16.1)
IRON % SATURATION: 17 % (ref 20–50)
IRON: 49 MCG/DL (ref 30–160)
LYMPHOCYTES # BLD: 25 % (ref 20–45)
LYMPHOCYTES ABSOLUTE: 1.8 K/UL (ref 1–4.8)
MCH RBC QN AUTO: 26 PG (ref 26–34)
MCHC RBC AUTO-ENTMCNC: 33 G/DL (ref 31–36)
MCV RBC AUTO: 81 FL (ref 80–99)
MONOCYTES ABSOLUTE: 0.8 K/UL (ref 0.1–1)
MONOCYTES: 12 % (ref 3–12)
NEUTROPHILS ABSOLUTE: 4.3 K/UL (ref 1.8–7.7)
NEUTROPHILS: 61 % (ref 40–75)
PDW BLD-RTO: 19.1 % (ref 10–15.5)
PLATELET # BLD: 324 K/UL (ref 140–440)
PMV BLD AUTO: 10.8 FL (ref 9–13)
POTASSIUM SERPL-SCNC: 4.2 MMOL/L (ref 3.5–5.5)
RBC: 4.51 M/UL (ref 3.8–5.2)
SODIUM BLD-SCNC: 139 MMOL/L (ref 133–145)
TOTAL IRON BINDING CAPACITY: 282 MCG/DL (ref 228–428)
TOTAL PROTEIN: 7.9 G/DL (ref 6.2–8.1)
UIBC: 233 MCG/DL (ref 110–370)
VITAMIN B-12: >2000 PG/ML (ref 211–911)
VITAMIN B1 WHOLE BLOOD: 130.3 NMOL/L (ref 66.5–200)
VITAMIN D 25-HYDROXY: 64.8 NG/ML (ref 32–100)
WBC: 7.1 K/UL (ref 4–11)

## 2023-04-28 ENCOUNTER — OFFICE VISIT (OUTPATIENT)
Age: 73
End: 2023-04-28
Payer: MEDICARE

## 2023-04-28 VITALS
HEART RATE: 73 BPM | WEIGHT: 253 LBS | DIASTOLIC BLOOD PRESSURE: 62 MMHG | HEIGHT: 61 IN | TEMPERATURE: 97 F | SYSTOLIC BLOOD PRESSURE: 138 MMHG | OXYGEN SATURATION: 96 % | BODY MASS INDEX: 47.77 KG/M2

## 2023-04-28 DIAGNOSIS — E66.01 MORBID OBESITY (HCC): ICD-10-CM

## 2023-04-28 DIAGNOSIS — M17.0 BILATERAL PRIMARY OSTEOARTHRITIS OF KNEE: ICD-10-CM

## 2023-04-28 DIAGNOSIS — Z98.84 S/P GASTRIC BYPASS: ICD-10-CM

## 2023-04-28 DIAGNOSIS — K91.2 POST-RESECTION MALABSORPTION: Primary | ICD-10-CM

## 2023-04-28 DIAGNOSIS — I10 ESSENTIAL (PRIMARY) HYPERTENSION: ICD-10-CM

## 2023-04-28 DIAGNOSIS — E11.65 TYPE 2 DIABETES MELLITUS WITH HYPERGLYCEMIA, WITHOUT LONG-TERM CURRENT USE OF INSULIN (HCC): ICD-10-CM

## 2023-04-28 DIAGNOSIS — E78.5 HYPERLIPIDEMIA, UNSPECIFIED HYPERLIPIDEMIA TYPE: ICD-10-CM

## 2023-04-28 PROCEDURE — 1123F ACP DISCUSS/DSCN MKR DOCD: CPT | Performed by: REGISTERED NURSE

## 2023-04-28 PROCEDURE — G8427 DOCREV CUR MEDS BY ELIG CLIN: HCPCS | Performed by: REGISTERED NURSE

## 2023-04-28 PROCEDURE — 4004F PT TOBACCO SCREEN RCVD TLK: CPT | Performed by: REGISTERED NURSE

## 2023-04-28 PROCEDURE — 3044F HG A1C LEVEL LT 7.0%: CPT | Performed by: REGISTERED NURSE

## 2023-04-28 PROCEDURE — 1090F PRES/ABSN URINE INCON ASSESS: CPT | Performed by: REGISTERED NURSE

## 2023-04-28 PROCEDURE — 2022F DILAT RTA XM EVC RTNOPTHY: CPT | Performed by: REGISTERED NURSE

## 2023-04-28 PROCEDURE — G8419 CALC BMI OUT NRM PARAM NOF/U: HCPCS | Performed by: REGISTERED NURSE

## 2023-04-28 PROCEDURE — 3075F SYST BP GE 130 - 139MM HG: CPT | Performed by: REGISTERED NURSE

## 2023-04-28 PROCEDURE — 3017F COLORECTAL CA SCREEN DOC REV: CPT | Performed by: REGISTERED NURSE

## 2023-04-28 PROCEDURE — 99214 OFFICE O/P EST MOD 30 MIN: CPT | Performed by: REGISTERED NURSE

## 2023-04-28 PROCEDURE — G8400 PT W/DXA NO RESULTS DOC: HCPCS | Performed by: REGISTERED NURSE

## 2023-04-28 PROCEDURE — 3078F DIAST BP <80 MM HG: CPT | Performed by: REGISTERED NURSE

## 2023-04-28 ASSESSMENT — ENCOUNTER SYMPTOMS
GASTROINTESTINAL NEGATIVE: 1
CHEST TIGHTNESS: 0
SHORTNESS OF BREATH: 0

## 2023-04-28 NOTE — PROGRESS NOTES
Bariatric Postoperative Progress Note    Chief Complaint   Patient presents with    Weight Management     3 month follow up,  LGBP 1/11/23     Jyothi Anguiano is a 81 y.o. female now 3 months status post laparoscopic gastric bypass surgery performed on 1/11/23. Doing well overall. Currently eating protein shake, chicken, fish, eggs, pork, beef, turkey, spinach, green beans, sweet peas, baked potato chip. Taking in 64 oz sugar free fluids,  40 g protein. 0 min of activity 7 days a week. Bowel movements are constipated. She is compliant with multivitamins, calcium, Vit D and B12 supplements. Denies tobacco, NSAID, and ETOH use. Weight Loss Metrics 4/28/2023 2/11/2023 1/27/2023 12/30/2022 12/20/2022 7/26/2022 6/1/2022   Pre-op weight (manual entry) 314 lbs - - - - - -   Height 5' 1\" 5' 0\" 5' 3\" 5' 0\" 5' 0\" 5' 0\" -   Weight 253 lbs 293 lbs 293 lbs 314 lbs 314 lbs 319 lbs 11 oz 345 lbs 10 oz   BMI (Calculated) 47.9 kg/m2 57.3 kg/m2 52 kg/m2 61.5 kg/m2 61.5 kg/m2 0 kg/m2 0 kg/m2   Ideal body weight (manual entry) 120 lbs - - - - - -   EBW in lbs. 194 - - - - - -   Goal weight 159 lbs - - - - - -   Weight loss to date in lbs. 61 - - - - - -   Percent weight loss (%) 19.43 % - - - - - -   Percent EBW loss (%) 31.4 % - - - - - -   Some recent data might be hidden        Comorbidities:  Hypertension: improved  Diabetes:  Medication decreased.  Will check A1c  Obstructive Sleep Apnea: resolved  Hyperlipidemia:  Will order lipid panel   Stress Urinary Incontinence: not applicable  Gastroesophageal Reflux: unchanged  Weight related arthropathy:improved      Past Medical History:   Diagnosis Date    Asthma     CAD (coronary artery disease) 2005    insignif = 20% les mid LAD    Chronic pain 2005    Colitis     Colitis     Diabetes ([facility] [address]     Diabetes mellitus with renal complications ([facility] [address] 9557    Diabetic gastroparesis ([facility] [address]     DJD (degenerative joint disease), multiple sites     GERD (gastroesophageal reflux disease)

## 2023-07-22 LAB
A/G RATIO: 0.8 RATIO (ref 1.1–2.6)
ALBUMIN SERPL-MCNC: 3.1 G/DL (ref 3.5–5)
ALP BLD-CCNC: 94 U/L (ref 40–120)
ALT SERPL-CCNC: 66 U/L (ref 5–40)
ANION GAP SERPL CALCULATED.3IONS-SCNC: 9 MMOL/L (ref 3–15)
AST SERPL-CCNC: 68 U/L (ref 10–37)
AVERAGE GLUCOSE: 101 MG/DL (ref 91–123)
BASOPHILS # BLD: 0 % (ref 0–2)
BASOPHILS ABSOLUTE: 0 K/UL (ref 0–0.2)
BILIRUB SERPL-MCNC: 0.1 MG/DL (ref 0.2–1.2)
BUN BLDV-MCNC: 20 MG/DL (ref 6–22)
CALCIUM SERPL-MCNC: 9 MG/DL (ref 8.4–10.5)
CHLORIDE BLD-SCNC: 105 MMOL/L (ref 98–110)
CHOLESTEROL/HDL RATIO: 1.9 (ref 0–5)
CHOLESTEROL: 93 MG/DL (ref 110–200)
CO2: 29 MMOL/L (ref 20–32)
CREAT SERPL-MCNC: 0.8 MG/DL (ref 0.8–1.4)
EOSINOPHIL # BLD: 2 % (ref 0–6)
EOSINOPHILS ABSOLUTE: 0.1 K/UL (ref 0–0.5)
FERRITIN: 146 NG/ML (ref 10–291)
GLOBULIN: 3.7 G/DL (ref 2–4)
GLOMERULAR FILTRATION RATE: >60 ML/MIN/1.73 SQ.M.
GLUCOSE: 122 MG/DL (ref 70–99)
HBA1C MFR BLD: 5.2 % (ref 4.8–5.6)
HCT VFR BLD CALC: 31 % (ref 35.1–48.3)
HDLC SERPL-MCNC: 50 MG/DL
HEMOGLOBIN: 9.8 G/DL (ref 11.7–16.1)
IRON % SATURATION: 13 % (ref 20–50)
IRON: 37 MCG/DL (ref 30–160)
LDL CHOLESTEROL CALCULATED: 27 MG/DL (ref 50–99)
LDL/HDL RATIO: 0.5
LYMPHOCYTES # BLD: 20 % (ref 20–45)
LYMPHOCYTES ABSOLUTE: 1.4 K/UL (ref 1–4.8)
MCH RBC QN AUTO: 27 PG (ref 26–34)
MCHC RBC AUTO-ENTMCNC: 32 G/DL (ref 31–36)
MCV RBC AUTO: 85 FL (ref 80–99)
MONOCYTES ABSOLUTE: 0.7 K/UL (ref 0.1–1)
MONOCYTES: 9 % (ref 3–12)
NEUTROPHILS ABSOLUTE: 5 K/UL (ref 1.8–7.7)
NEUTROPHILS: 69 % (ref 40–75)
NON-HDL CHOLESTEROL: 43 MG/DL
PDW BLD-RTO: 17.9 % (ref 10–15.5)
PLATELET # BLD: 386 K/UL (ref 140–440)
PMV BLD AUTO: 10.2 FL (ref 9–13)
POTASSIUM SERPL-SCNC: 3.9 MMOL/L (ref 3.5–5.5)
RBC: 3.64 M/UL (ref 3.8–5.2)
SODIUM BLD-SCNC: 143 MMOL/L (ref 133–145)
THIAMINE BLOOD: 144 NMOL/L (ref 78–185)
TOTAL IRON BINDING CAPACITY: 282 MCG/DL (ref 228–428)
TOTAL PROTEIN: 6.8 G/DL (ref 6.2–8.1)
TRIGL SERPL-MCNC: 78 MG/DL (ref 40–149)
UIBC: 245 MCG/DL (ref 110–370)
VITAMIN B-12: 1596 PG/ML (ref 211–911)
VITAMIN D 25-HYDROXY: 46.5 NG/ML (ref 32–100)
VLDLC SERPL CALC-MCNC: 16 MG/DL (ref 8–30)
WBC: 7.3 K/UL (ref 4–11)

## 2023-07-26 ENCOUNTER — TELEPHONE (OUTPATIENT)
Age: 73
End: 2023-07-26

## 2023-07-26 NOTE — TELEPHONE ENCOUNTER
Called pt regarding lab results done on 7/19/2023. Noted decreased albumin. Pt drinking 1 protein shake a day. Encouraged her to increase to 2 with PO supplementation. Follow up next week to discuss progress. Pt agreeable to plan.

## 2023-08-02 ENCOUNTER — OFFICE VISIT (OUTPATIENT)
Age: 73
End: 2023-08-02
Payer: MEDICARE

## 2023-08-02 VITALS
OXYGEN SATURATION: 98 % | RESPIRATION RATE: 16 BRPM | WEIGHT: 239 LBS | SYSTOLIC BLOOD PRESSURE: 113 MMHG | HEIGHT: 61 IN | DIASTOLIC BLOOD PRESSURE: 50 MMHG | BODY MASS INDEX: 45.12 KG/M2 | TEMPERATURE: 98 F | HEART RATE: 69 BPM

## 2023-08-02 DIAGNOSIS — E66.01 MORBID OBESITY (HCC): ICD-10-CM

## 2023-08-02 DIAGNOSIS — Z98.84 S/P GASTRIC BYPASS: ICD-10-CM

## 2023-08-02 DIAGNOSIS — I10 ESSENTIAL (PRIMARY) HYPERTENSION: ICD-10-CM

## 2023-08-02 DIAGNOSIS — E11.65 TYPE 2 DIABETES MELLITUS WITH HYPERGLYCEMIA, WITHOUT LONG-TERM CURRENT USE OF INSULIN (HCC): ICD-10-CM

## 2023-08-02 DIAGNOSIS — E78.5 HYPERLIPIDEMIA, UNSPECIFIED HYPERLIPIDEMIA TYPE: ICD-10-CM

## 2023-08-02 DIAGNOSIS — K91.2 POST-RESECTION MALABSORPTION: Primary | ICD-10-CM

## 2023-08-02 PROCEDURE — 3078F DIAST BP <80 MM HG: CPT | Performed by: REGISTERED NURSE

## 2023-08-02 PROCEDURE — 1090F PRES/ABSN URINE INCON ASSESS: CPT | Performed by: REGISTERED NURSE

## 2023-08-02 PROCEDURE — 1036F TOBACCO NON-USER: CPT | Performed by: REGISTERED NURSE

## 2023-08-02 PROCEDURE — G8400 PT W/DXA NO RESULTS DOC: HCPCS | Performed by: REGISTERED NURSE

## 2023-08-02 PROCEDURE — 99214 OFFICE O/P EST MOD 30 MIN: CPT | Performed by: REGISTERED NURSE

## 2023-08-02 PROCEDURE — 2022F DILAT RTA XM EVC RTNOPTHY: CPT | Performed by: REGISTERED NURSE

## 2023-08-02 PROCEDURE — 3044F HG A1C LEVEL LT 7.0%: CPT | Performed by: REGISTERED NURSE

## 2023-08-02 PROCEDURE — 3017F COLORECTAL CA SCREEN DOC REV: CPT | Performed by: REGISTERED NURSE

## 2023-08-02 PROCEDURE — G8417 CALC BMI ABV UP PARAM F/U: HCPCS | Performed by: REGISTERED NURSE

## 2023-08-02 PROCEDURE — G8427 DOCREV CUR MEDS BY ELIG CLIN: HCPCS | Performed by: REGISTERED NURSE

## 2023-08-02 PROCEDURE — 1123F ACP DISCUSS/DSCN MKR DOCD: CPT | Performed by: REGISTERED NURSE

## 2023-08-02 PROCEDURE — 3074F SYST BP LT 130 MM HG: CPT | Performed by: REGISTERED NURSE

## 2023-08-02 RX ORDER — IBUPROFEN 200 MG
1 CAPSULE ORAL DAILY
COMMUNITY

## 2023-08-02 ASSESSMENT — ENCOUNTER SYMPTOMS
CHEST TIGHTNESS: 0
GASTROINTESTINAL NEGATIVE: 1
SHORTNESS OF BREATH: 0

## 2023-08-02 NOTE — PROGRESS NOTES
Bariatric Postoperative Progress Note    Chief Complaint   Patient presents with    Follow-up     LGBP 1/11/2023     Danyel Ríos is a 72 y.o. female now 7 months status post laparoscopic gastric bypass surgery performed on 1/11/23. Doing well overall. Accompanied by daughter. Diet consists of chicken, seafood, beef, and turkey. Taking in 64-90 oz water, 60 g protein. Chair exercises for about 30 min 3-4 x a week. Bowel movements are regular. Pain is not well controlled. Medication(s) being used: narcotic analgesics including Dilaudid PRN. She is compliant with multivitamins, calcium, Vit D, B1, and B12 supplements. Goal weight: 175 lbs  Denies NSAID, tobacco, and ETOH use. Follow up with PCP this month to discuss medication changes. Has not needed to use her Humalog since surgery. Ambulatory Bariatric Summary 8/2/2023 8/2/2023 4/28/2023 2/11/2023 1/27/2023 1/27/2023 4/74/1393   Systolic 339 400 684 464 375 521 820   Diastolic 50 47 62 75 78 83 76   Pulse - 69 73 95 - 81 71   Temp - 98 97 98.3 - - -   Resp - 16 - 18 - - -   Weight - 239 253 293 - 293 -   Height - 61 61 60 - 63 -   BMI - 45.3 kg/m2 47.9 kg/m2 57.3 kg/m2 - 52 kg/m2 -     Last Weight Metrics:  Weight Loss Metrics 8/2/2023 4/28/2023 2/11/2023 1/27/2023 12/30/2022 12/20/2022 7/26/2022   Pre-op weight (manual entry) 314 lbs 314 lbs - - - - -   Height 5' 1\" 5' 1\" 5' 0\" 5' 3\" 5' 0\" 5' 0\" 5' 0\"   Weight 239 lbs 253 lbs 293 lbs 293 lbs 314 lbs 314 lbs 319 lbs 11 oz   BMI (Calculated) 45.3 kg/m2 47.9 kg/m2 57.3 kg/m2 52 kg/m2 61.5 kg/m2 61.5 kg/m2 0 kg/m2   Ideal body weight (manual entry) 120 lbs 120 lbs - - - - -   EBW in lbs. 194 194 - - - - -   Goal weight - 159 lbs - - - - -   Weight loss to date in lbs.  75 61 - - - - -   Percent weight loss (%) 23.89 % 19.43 % - - - - -   Percent EBW loss (%) 38.7 % 31.4 % - - - - -   Some recent data might be hidden       Comorbidities:  Hypertension: resolved  Diabetes: resolved  Obstructive Sleep Apnea:

## 2023-11-03 ENCOUNTER — OFFICE VISIT (OUTPATIENT)
Age: 73
End: 2023-11-03
Payer: MEDICARE

## 2023-11-03 VITALS
TEMPERATURE: 97 F | WEIGHT: 244 LBS | HEART RATE: 67 BPM | HEIGHT: 60 IN | OXYGEN SATURATION: 100 % | BODY MASS INDEX: 47.91 KG/M2 | SYSTOLIC BLOOD PRESSURE: 149 MMHG | DIASTOLIC BLOOD PRESSURE: 63 MMHG

## 2023-11-03 DIAGNOSIS — Z98.84 GENERALIZED EXCESS AND REDUNDANT SKIN AFTER BARIATRIC SURGERY: ICD-10-CM

## 2023-11-03 DIAGNOSIS — E66.01 MORBID OBESITY (HCC): ICD-10-CM

## 2023-11-03 DIAGNOSIS — Z98.84 S/P GASTRIC BYPASS: Primary | ICD-10-CM

## 2023-11-03 DIAGNOSIS — L98.7 GENERALIZED EXCESS AND REDUNDANT SKIN AFTER BARIATRIC SURGERY: ICD-10-CM

## 2023-11-03 DIAGNOSIS — E11.65 TYPE 2 DIABETES MELLITUS WITH HYPERGLYCEMIA, WITHOUT LONG-TERM CURRENT USE OF INSULIN (HCC): ICD-10-CM

## 2023-11-03 DIAGNOSIS — K90.829 SHORT BOWEL SYNDROME, UNSPECIFIED WHETHER COLON IN CONTINUITY: ICD-10-CM

## 2023-11-03 PROCEDURE — G8484 FLU IMMUNIZE NO ADMIN: HCPCS | Performed by: REGISTERED NURSE

## 2023-11-03 PROCEDURE — 1090F PRES/ABSN URINE INCON ASSESS: CPT | Performed by: REGISTERED NURSE

## 2023-11-03 PROCEDURE — 3077F SYST BP >= 140 MM HG: CPT | Performed by: REGISTERED NURSE

## 2023-11-03 PROCEDURE — G8427 DOCREV CUR MEDS BY ELIG CLIN: HCPCS | Performed by: REGISTERED NURSE

## 2023-11-03 PROCEDURE — 2022F DILAT RTA XM EVC RTNOPTHY: CPT | Performed by: REGISTERED NURSE

## 2023-11-03 PROCEDURE — G8417 CALC BMI ABV UP PARAM F/U: HCPCS | Performed by: REGISTERED NURSE

## 2023-11-03 PROCEDURE — 99214 OFFICE O/P EST MOD 30 MIN: CPT | Performed by: REGISTERED NURSE

## 2023-11-03 PROCEDURE — 3078F DIAST BP <80 MM HG: CPT | Performed by: REGISTERED NURSE

## 2023-11-03 PROCEDURE — G8399 PT W/DXA RESULTS DOCUMENT: HCPCS | Performed by: REGISTERED NURSE

## 2023-11-03 PROCEDURE — 3044F HG A1C LEVEL LT 7.0%: CPT | Performed by: REGISTERED NURSE

## 2023-11-03 PROCEDURE — 3017F COLORECTAL CA SCREEN DOC REV: CPT | Performed by: REGISTERED NURSE

## 2023-11-03 PROCEDURE — 1036F TOBACCO NON-USER: CPT | Performed by: REGISTERED NURSE

## 2023-11-03 PROCEDURE — 1123F ACP DISCUSS/DSCN MKR DOCD: CPT | Performed by: REGISTERED NURSE

## 2023-11-03 ASSESSMENT — ENCOUNTER SYMPTOMS
SHORTNESS OF BREATH: 0
GASTROINTESTINAL NEGATIVE: 1
CHEST TIGHTNESS: 0

## 2023-11-03 NOTE — PROGRESS NOTES
.sc
(CALCITRATE) 950 (200 Ca) MG tablet Take 1 tablet by mouth daily      albuterol sulfate HFA (PROVENTIL;VENTOLIN;PROAIR) 108 (90 Base) MCG/ACT inhaler Inhale 2 puffs into the lungs every 4 hours as needed      amLODIPine (NORVASC) 10 MG tablet Take 1 tablet by mouth daily      apixaban (ELIQUIS) 5 MG TABS tablet Take 1 tablet by mouth 2 times daily      vitamin D (CHOLECALCIFEROL) 86940 UNIT CAPS Take by mouth      dicyclomine (BENTYL) 20 MG tablet Take by mouth 2 times daily      HYDROmorphone (DILAUDID) 4 MG tablet Take 1 tablet by mouth every 6 hours as needed. Insulin Glargine, 2 Unit Dial, 300 UNIT/ML SOPN Inject 150 Units into the skin      insulin lispro (HUMALOG) 100 UNIT/ML SOLN injection vial Inject into the skin      isosorbide mononitrate (IMDUR) 30 MG extended release tablet Take 1 tablet by mouth daily      latanoprost (XALATAN) 0.005 % ophthalmic solution ceived the following from [facility]: Outside name: latanoprost (XALATAN) 0.005 % ophthalmic solution      losartan (COZAAR) 100 MG tablet Take 25 mg by mouth daily      montelukast (SINGULAIR) 10 MG tablet Take 1 tablet by mouth daily      pregabalin (LYRICA) 150 MG capsule Take by mouth daily. promethazine (PHENERGAN) 12.5 MG tablet Take by mouth every 6 hours as needed      rosuvastatin (CRESTOR) 40 MG tablet Take 1 tablet by mouth daily      Vibegron (GEMTESA) 75 MG TABS Take 1 tablet by mouth daily       No current facility-administered medications for this visit. Allergies   Allergen Reactions    Shellfish Allergy Anaphylaxis    Metformin Diarrhea    Metoclopramide Other (See Comments)     jittery    Tramadol Other (See Comments)     No help for DJD pain and upset stomach       ROS:  Review of Systems   Constitutional:  Negative for fatigue and unexpected weight change. Eyes:  Negative for visual disturbance. Respiratory:  Negative for chest tightness and shortness of breath.     Cardiovascular:  Negative for

## 2023-11-03 NOTE — PATIENT INSTRUCTIONS
Dr. Stefan Crawford  [address] Plastic Surgery  Phone: [phone]  [address], [address]    Dr. Earl Dumont Surgery  Phone: [phone], [address]  [place] [address] [address], [address], [address]    Dr. Laquetta Specter [facility] Phone: [facility]. [address]  [facility], 2 Dima Cabello in Plastic Surgery  Phone [phone]  [address], [address]    [address] [facility], [address] Dima Marie Double  [address] [facility] [address], [address] [facility] [address]  [facility], [address]  [phone]    Dr. Misha Richards  [facility] Jeremias Hammer [address] [place] [address], [facility] [address]  [phone]    Dr. Ezekiel Murray  The [address]   [address] [facility], [address]  [address] [facility] Surgery  Multiple locations in [place], [place]  [phone]

## 2023-12-29 ENCOUNTER — TELEPHONE (OUTPATIENT)
Age: 73
End: 2023-12-29

## 2023-12-29 NOTE — TELEPHONE ENCOUNTER
Pt's daughter Arlin Castellanos called stating that she needed to cancel upcoming appt due to pt being admitted to the hospital and being moved to rehab until February. Marika states that pt will call back to reschedule at a later time. Marika requested a call back at her number [phone] regarding her mother's vitamins and medication. The pt was admitted on 12/13 and will be in rehab till Feb and has not had her vitamins or her medications since her admission. Marika and the patient were looking for advice on what to do in this scenario.  Marika stated it was okay to leave a message if she couldn't answer the phone

## 2024-01-11 NOTE — PERIOP NOTES
Phase 2 Recovery Summary  Patient arrived to Phase 2 at 1217  Report received from Violet hTomas  Vitals:    07/23/19 1139 07/23/19 1157 07/23/19 1218 07/23/19 1228   BP: 129/58  125/58 125/58   Pulse: 95  88 86   Resp: 18   18   Temp:  98.4 °F (36.9 °C)     SpO2: 96%  93% 94%   Weight: 148.8 kg (328 lb)      Height: 5' (1.524 m)          oriented to time, place, person and situation    Lines and Drains  Peripheral Intravenous Line:  Removed prior to discharge. Patient and daughter instructed in discharge and verbalized understanding.      Patient discharged to home with daughter  at [address] [facility], [address] [facility]
FEVER/WEAKNESS

## 2024-01-16 ENCOUNTER — TELEPHONE (OUTPATIENT)
Age: 74
End: 2024-01-16

## 2024-01-16 NOTE — TELEPHONE ENCOUNTER
Patient has been in hospital since December and states she hasn't been able to take her meds/vitamins. The patient was offered to have an appointment scheduled; however, she only wanted to speak with Marcela regarding the issue via phone at the moment. She would like to know what she needs to do.     Patient's phone number is [phone]. Thank you!

## 2024-01-17 ENCOUNTER — TELEPHONE (OUTPATIENT)
Age: 74
End: 2024-01-17

## 2024-01-17 NOTE — TELEPHONE ENCOUNTER
Patient called requesting to speak to JUAN C Medrano regarding bariatric supplements. Informed pt that Marcela returned her call yesterday but voicemail box is full. Also, I requested the name of the facility she is currently at. Patient states she doesn't know but she will call back to give me the name.

## 2024-01-25 ENCOUNTER — TELEPHONE (OUTPATIENT)
Age: 74
End: 2024-01-25

## 2024-01-25 NOTE — TELEPHONE ENCOUNTER
Patient is calling to speak with Marcela via phone. Patient's phone number is [phone]. Thank you!

## 2024-01-26 NOTE — TELEPHONE ENCOUNTER
Returned pt's phone call. Had sent prescription to rehab facility regarding her bariatric supplementation. Will call back if any issues.

## 2024-03-22 ENCOUNTER — OFFICE VISIT (OUTPATIENT)
Age: 74
End: 2024-03-22
Payer: MEDICARE

## 2024-03-22 VITALS
OXYGEN SATURATION: 99 % | HEART RATE: 78 BPM | BODY MASS INDEX: 47.91 KG/M2 | DIASTOLIC BLOOD PRESSURE: 59 MMHG | HEIGHT: 60 IN | TEMPERATURE: 97.2 F | SYSTOLIC BLOOD PRESSURE: 120 MMHG | WEIGHT: 244 LBS

## 2024-03-22 DIAGNOSIS — I50.32 CHRONIC DIASTOLIC (CONGESTIVE) HEART FAILURE (HCC): ICD-10-CM

## 2024-03-22 DIAGNOSIS — R06.02 EXERTIONAL SHORTNESS OF BREATH: Primary | ICD-10-CM

## 2024-03-22 DIAGNOSIS — R01.1 SYSTOLIC MURMUR: ICD-10-CM

## 2024-03-22 DIAGNOSIS — E78.00 HYPERCHOLESTEREMIA: ICD-10-CM

## 2024-03-22 DIAGNOSIS — I33.0 CHRONIC BACTERIAL ENDOCARDITIS: ICD-10-CM

## 2024-03-22 DIAGNOSIS — E66.01 CLASS 3 SEVERE OBESITY DUE TO EXCESS CALORIES WITH SERIOUS COMORBIDITY AND BODY MASS INDEX (BMI) OF 45.0 TO 49.9 IN ADULT (HCC): ICD-10-CM

## 2024-03-22 DIAGNOSIS — I10 PRIMARY HYPERTENSION: ICD-10-CM

## 2024-03-22 PROCEDURE — 3078F DIAST BP <80 MM HG: CPT | Performed by: INTERNAL MEDICINE

## 2024-03-22 PROCEDURE — 3074F SYST BP LT 130 MM HG: CPT | Performed by: INTERNAL MEDICINE

## 2024-03-22 PROCEDURE — 3017F COLORECTAL CA SCREEN DOC REV: CPT | Performed by: INTERNAL MEDICINE

## 2024-03-22 PROCEDURE — 1123F ACP DISCUSS/DSCN MKR DOCD: CPT | Performed by: INTERNAL MEDICINE

## 2024-03-22 PROCEDURE — G8427 DOCREV CUR MEDS BY ELIG CLIN: HCPCS | Performed by: INTERNAL MEDICINE

## 2024-03-22 PROCEDURE — G8399 PT W/DXA RESULTS DOCUMENT: HCPCS | Performed by: INTERNAL MEDICINE

## 2024-03-22 PROCEDURE — 99215 OFFICE O/P EST HI 40 MIN: CPT | Performed by: INTERNAL MEDICINE

## 2024-03-22 PROCEDURE — G8484 FLU IMMUNIZE NO ADMIN: HCPCS | Performed by: INTERNAL MEDICINE

## 2024-03-22 PROCEDURE — G8417 CALC BMI ABV UP PARAM F/U: HCPCS | Performed by: INTERNAL MEDICINE

## 2024-03-22 PROCEDURE — 1036F TOBACCO NON-USER: CPT | Performed by: INTERNAL MEDICINE

## 2024-03-22 PROCEDURE — 1090F PRES/ABSN URINE INCON ASSESS: CPT | Performed by: INTERNAL MEDICINE

## 2024-03-22 RX ORDER — NITROGLYCERIN 0.4 MG/1
0.4 TABLET SUBLINGUAL EVERY 5 MIN PRN
COMMUNITY
Start: 2015-07-27

## 2024-03-22 RX ORDER — OMEPRAZOLE 40 MG/1
40 CAPSULE, DELAYED RELEASE ORAL DAILY
COMMUNITY
Start: 2023-01-12

## 2024-03-22 RX ORDER — IPRATROPIUM BROMIDE AND ALBUTEROL 20; 100 UG/1; UG/1
SPRAY, METERED RESPIRATORY (INHALATION)
COMMUNITY
Start: 2024-03-09

## 2024-03-22 ASSESSMENT — PATIENT HEALTH QUESTIONNAIRE - PHQ9
SUM OF ALL RESPONSES TO PHQ QUESTIONS 1-9: 0
2. FEELING DOWN, DEPRESSED OR HOPELESS: NOT AT ALL
1. LITTLE INTEREST OR PLEASURE IN DOING THINGS: NOT AT ALL
SUM OF ALL RESPONSES TO PHQ QUESTIONS 1-9: 0
SUM OF ALL RESPONSES TO PHQ QUESTIONS 1-9: 0
SUM OF ALL RESPONSES TO PHQ9 QUESTIONS 1 & 2: 0
SUM OF ALL RESPONSES TO PHQ QUESTIONS 1-9: 0

## 2024-03-22 ASSESSMENT — ENCOUNTER SYMPTOMS
GASTROINTESTINAL NEGATIVE: 1
ALLERGIC/IMMUNOLOGIC NEGATIVE: 1
EYES NEGATIVE: 1

## 2024-03-22 NOTE — PROGRESS NOTES
1. \"Have you been to the ER, urgent care clinic since your last visit?  Hospitalized since your last visit?\" Reviewed by Dr. Roland Spear    2. \"Have you seen or consulted any other health care providers outside of the [facility] since your last visit?\" Reviewed by Dr. Roland Spear   
Take 1 tablet by mouth daily      pregabalin (LYRICA) 150 MG capsule Take by mouth daily.      promethazine (PHENERGAN) 12.5 MG tablet Take by mouth every 6 hours as needed      rosuvastatin (CRESTOR) 40 MG tablet Take 1 tablet by mouth daily      Vibegron (GEMTESA) 75 MG TABS Take 1 tablet by mouth daily      LUTEIN PO Take 1 tablet by mouth daily      nitroGLYCERIN (NITROSTAT) 0.4 MG SL tablet Place 1 tablet under the tongue every 5 minutes as needed       No current facility-administered medications for this visit.        Social History     Tobacco Use    Smoking status: Never    Smokeless tobacco: Never   Substance Use Topics    Alcohol use: No    Drug use: No        Family History   Problem Relation Age of Onset    Diabetes Brother     Hypertension Sister     Diabetes Sister     Diabetes Sister     Cancer Brother         throat    Hypertension Sister         Review of Systems   Constitutional: Negative.    HENT: Negative.     Eyes: Negative.    Respiratory:  Positive for shortness of breath.    Cardiovascular:  Negative for chest pain and palpitations.   Gastrointestinal: Negative.    Endocrine: Negative.    Genitourinary: Negative.    Allergic/Immunologic: Negative.    Neurological: Negative.    Hematological: Negative.    Psychiatric/Behavioral: Negative.       Physical Exam  Vitals and nursing note reviewed.   Constitutional:       Appearance: Normal appearance.   HENT:      Head: Normocephalic and atraumatic.      Right Ear: External ear normal.      Left Ear: External ear normal.      Nose: Nose normal.      Mouth/Throat:      Mouth: Mucous membranes are dry.      Pharynx: Oropharynx is clear.   Eyes:      Extraocular Movements: Extraocular movements intact.      Conjunctiva/sclera: Conjunctivae normal.      Pupils: Pupils are equal, round, and reactive to light.   Neck:      Thyroid: No thyroid mass.      Vascular: Carotid bruit present. No JVD.      Trachea: Trachea normal.   Cardiovascular:      Rate

## 2024-03-23 ASSESSMENT — ENCOUNTER SYMPTOMS: SHORTNESS OF BREATH: 1

## 2024-05-30 ENCOUNTER — OFFICE VISIT (OUTPATIENT)
Facility: CLINIC | Age: 74
End: 2024-05-30

## 2024-05-30 ENCOUNTER — HOSPITAL ENCOUNTER (OUTPATIENT)
Facility: HOSPITAL | Age: 74
Setting detail: SPECIMEN
Discharge: HOME | End: 2024-05-30
Payer: MEDICARE

## 2024-05-30 VITALS
RESPIRATION RATE: 16 BRPM | DIASTOLIC BLOOD PRESSURE: 75 MMHG | OXYGEN SATURATION: 99 % | WEIGHT: 233 LBS | SYSTOLIC BLOOD PRESSURE: 123 MMHG | TEMPERATURE: 97 F | HEIGHT: 60 IN | HEART RATE: 79 BPM | BODY MASS INDEX: 45.75 KG/M2

## 2024-05-30 DIAGNOSIS — E66.01 MORBID OBESITY (HCC): ICD-10-CM

## 2024-05-30 DIAGNOSIS — Z79.4 TYPE 2 DIABETES MELLITUS WITH DIABETIC AUTONOMIC NEUROPATHY, WITH LONG-TERM CURRENT USE OF INSULIN (HCC): Primary | ICD-10-CM

## 2024-05-30 DIAGNOSIS — E11.43 TYPE 2 DIABETES MELLITUS WITH DIABETIC AUTONOMIC NEUROPATHY, WITH LONG-TERM CURRENT USE OF INSULIN (HCC): Primary | ICD-10-CM

## 2024-05-30 DIAGNOSIS — M54.42 CHRONIC BILATERAL LOW BACK PAIN WITH LEFT-SIDED SCIATICA: ICD-10-CM

## 2024-05-30 DIAGNOSIS — G89.29 CHRONIC BILATERAL LOW BACK PAIN WITH LEFT-SIDED SCIATICA: ICD-10-CM

## 2024-05-30 DIAGNOSIS — E11.43 TYPE 2 DIABETES MELLITUS WITH DIABETIC AUTONOMIC NEUROPATHY, WITH LONG-TERM CURRENT USE OF INSULIN (HCC): ICD-10-CM

## 2024-05-30 DIAGNOSIS — G89.4 CHRONIC PAIN SYNDROME: ICD-10-CM

## 2024-05-30 DIAGNOSIS — R26.9 GAIT ABNORMALITY: ICD-10-CM

## 2024-05-30 DIAGNOSIS — M25.362 INSTABILITY OF BOTH KNEE JOINTS: ICD-10-CM

## 2024-05-30 DIAGNOSIS — E78.5 HYPERLIPIDEMIA, UNSPECIFIED HYPERLIPIDEMIA TYPE: ICD-10-CM

## 2024-05-30 DIAGNOSIS — Z79.4 TYPE 2 DIABETES MELLITUS WITH DIABETIC AUTONOMIC NEUROPATHY, WITH LONG-TERM CURRENT USE OF INSULIN (HCC): ICD-10-CM

## 2024-05-30 DIAGNOSIS — K21.9 GASTROESOPHAGEAL REFLUX DISEASE, UNSPECIFIED WHETHER ESOPHAGITIS PRESENT: ICD-10-CM

## 2024-05-30 DIAGNOSIS — I51.7 CARDIOMEGALY: ICD-10-CM

## 2024-05-30 DIAGNOSIS — M25.361 INSTABILITY OF BOTH KNEE JOINTS: ICD-10-CM

## 2024-05-30 DIAGNOSIS — M17.0 PRIMARY OSTEOARTHRITIS OF BOTH KNEES: ICD-10-CM

## 2024-05-30 DIAGNOSIS — I10 ESSENTIAL HYPERTENSION: ICD-10-CM

## 2024-05-30 LAB
ALBUMIN SERPL-MCNC: 3.3 G/DL (ref 3.4–5)
ALBUMIN/GLOB SERPL: 0.8 (ref 0.8–1.7)
ALP SERPL-CCNC: 133 U/L (ref 45–117)
ALT SERPL-CCNC: 45 U/L (ref 13–56)
ANION GAP SERPL CALC-SCNC: 7 MMOL/L (ref 3–18)
AST SERPL-CCNC: 44 U/L (ref 10–38)
BILIRUB SERPL-MCNC: 0.4 MG/DL (ref 0.2–1)
BUN SERPL-MCNC: 20 MG/DL (ref 7–18)
BUN/CREAT SERPL: 25 (ref 12–20)
CALCIUM SERPL-MCNC: 9.6 MG/DL (ref 8.5–10.1)
CHLORIDE SERPL-SCNC: 108 MMOL/L (ref 100–111)
CHOLEST SERPL-MCNC: 102 MG/DL
CO2 SERPL-SCNC: 28 MMOL/L (ref 21–32)
CREAT SERPL-MCNC: 0.8 MG/DL (ref 0.6–1.3)
CREAT UR-MCNC: 24 MG/DL (ref 30–125)
EST. AVERAGE GLUCOSE BLD GHB EST-MCNC: 103 MG/DL
GLOBULIN SER CALC-MCNC: 4.4 G/DL (ref 2–4)
GLUCOSE SERPL-MCNC: 110 MG/DL (ref 74–99)
HBA1C MFR BLD: 5.2 % (ref 4.2–5.6)
HDLC SERPL-MCNC: 57 MG/DL (ref 40–60)
HDLC SERPL: 1.8 (ref 0–5)
LDLC SERPL CALC-MCNC: 34.6 MG/DL (ref 0–100)
LIPID PANEL: NORMAL
MICROALBUMIN UR-MCNC: 65.9 MG/DL (ref 0–3)
MICROALBUMIN/CREAT UR-RTO: 2746 MG/G (ref 0–30)
POTASSIUM SERPL-SCNC: 3.6 MMOL/L (ref 3.5–5.5)
PROT SERPL-MCNC: 7.7 G/DL (ref 6.4–8.2)
SODIUM SERPL-SCNC: 143 MMOL/L (ref 136–145)
TRIGL SERPL-MCNC: 52 MG/DL
VLDLC SERPL CALC-MCNC: 10.4 MG/DL

## 2024-05-30 PROCEDURE — 36415 COLL VENOUS BLD VENIPUNCTURE: CPT

## 2024-05-30 PROCEDURE — 83036 HEMOGLOBIN GLYCOSYLATED A1C: CPT

## 2024-05-30 PROCEDURE — 82570 ASSAY OF URINE CREATININE: CPT

## 2024-05-30 PROCEDURE — 82043 UR ALBUMIN QUANTITATIVE: CPT

## 2024-05-30 PROCEDURE — 80053 COMPREHEN METABOLIC PANEL: CPT

## 2024-05-30 PROCEDURE — 80061 LIPID PANEL: CPT

## 2024-05-30 RX ORDER — FLASH GLUCOSE SENSOR
KIT MISCELLANEOUS
COMMUNITY

## 2024-05-30 RX ORDER — OMEPRAZOLE 40 MG/1
40 CAPSULE, DELAYED RELEASE ORAL DAILY
Qty: 90 CAPSULE | Refills: 1 | Status: SHIPPED | OUTPATIENT
Start: 2024-05-30

## 2024-05-30 RX ORDER — FLASH GLUCOSE SCANNING READER
EACH MISCELLANEOUS
COMMUNITY

## 2024-05-30 SDOH — HEALTH STABILITY: PHYSICAL HEALTH: ON AVERAGE, HOW MANY DAYS PER WEEK DO YOU ENGAGE IN MODERATE TO STRENUOUS EXERCISE (LIKE A BRISK WALK)?: 0 DAYS

## 2024-05-30 SDOH — ECONOMIC STABILITY: FOOD INSECURITY: WITHIN THE PAST 12 MONTHS, THE FOOD YOU BOUGHT JUST DIDN'T LAST AND YOU DIDN'T HAVE MONEY TO GET MORE.: NEVER TRUE

## 2024-05-30 SDOH — ECONOMIC STABILITY: FOOD INSECURITY: WITHIN THE PAST 12 MONTHS, YOU WORRIED THAT YOUR FOOD WOULD RUN OUT BEFORE YOU GOT MONEY TO BUY MORE.: NEVER TRUE

## 2024-05-30 SDOH — ECONOMIC STABILITY: INCOME INSECURITY: HOW HARD IS IT FOR YOU TO PAY FOR THE VERY BASICS LIKE FOOD, HOUSING, MEDICAL CARE, AND HEATING?: NOT HARD AT ALL

## 2024-05-30 SDOH — ECONOMIC STABILITY: HOUSING INSECURITY
IN THE LAST 12 MONTHS, WAS THERE A TIME WHEN YOU DID NOT HAVE A STEADY PLACE TO SLEEP OR SLEPT IN A SHELTER (INCLUDING NOW)?: NO

## 2024-05-30 ASSESSMENT — PATIENT HEALTH QUESTIONNAIRE - PHQ9
SUM OF ALL RESPONSES TO PHQ9 QUESTIONS 1 & 2: 0
SUM OF ALL RESPONSES TO PHQ QUESTIONS 1-9: 0
SUM OF ALL RESPONSES TO PHQ QUESTIONS 1-9: 0
1. LITTLE INTEREST OR PLEASURE IN DOING THINGS: NOT AT ALL
2. FEELING DOWN, DEPRESSED OR HOPELESS: NOT AT ALL
SUM OF ALL RESPONSES TO PHQ QUESTIONS 1-9: 0
SUM OF ALL RESPONSES TO PHQ QUESTIONS 1-9: 0

## 2024-05-30 ASSESSMENT — ENCOUNTER SYMPTOMS
BLOOD IN STOOL: 0
VOMITING: 0
COUGH: 0
ABDOMINAL PAIN: 0
NAUSEA: 1
SHORTNESS OF BREATH: 0

## 2024-05-30 NOTE — PROGRESS NOTES
Cyndee Jj (:  1950) is a 73 y.o. female,Established patient, here for evaluation of the following chief complaint(s):  New Patient (re-est care), Hypertension, and Diabetes      Assessment & Plan   1. Type 2 diabetes mellitus with diabetic autonomic neuropathy, with long-term current use of insulin (HCC)  -     Hemoglobin A1C; Future  -     Lipid Panel; Future  -     Microalbumin / Creatinine Urine Ratio; Future  -     Comprehensive Metabolic Panel; Future  2. Essential hypertension  -     Lipid Panel; Future  -     Microalbumin / Creatinine Urine Ratio; Future  -     Comprehensive Metabolic Panel; Future  3. Hyperlipidemia, unspecified hyperlipidemia type  -     Lipid Panel; Future  -     Comprehensive Metabolic Panel; Future  4. Chronic bilateral low back pain with left-sided sciatica  5. Cardiomegaly  6. Primary osteoarthritis of both knees  7. Chronic pain syndrome  8. Morbid obesity (HCC)  -     Hemoglobin A1C; Future  -     Lipid Panel; Future  -     Comprehensive Metabolic Panel; Future  9. Gait abnormality  10. Instability of both knee joints  11. Gastroesophageal reflux disease, unspecified whether esophagitis present  -     omeprazole (PRILOSEC) 40 MG delayed release capsule; Take 1 capsule by mouth daily, Disp-90 capsule, R-1Normal            Return in about 3 months (around 2024) for HTN, DM, HLD, WEIGHT CONCERNS, GERD, gastroparesis.       Subjective   Patient is a 73-year-old [facility]  She is a former patient of [facility].  Patient has a history of type 2 diabetes, hypertension, cardiomegaly, low back pain, obstructive sleep apnea on CPAP, asthma, arthritis, colitis, pulmonary embolism, gastroparesis, glaucoma, fatty liver, chronic pain syndrome and mitral valve endocarditis due to group B strep.      Patient Care Team:  Endocrinologist-  Puneet Winter, NP   Podiatrist- Dr. Dawkins  Pain Management-  Dr. Wilcox

## 2024-06-06 ENCOUNTER — TELEPHONE (OUTPATIENT)
Facility: CLINIC | Age: 74
End: 2024-06-06

## 2024-06-06 NOTE — TELEPHONE ENCOUNTER
Call received from [facility], [facility] they received all of the paperwork back for the powered wheelchair except for the LMN.    [facility] - [phone]

## 2024-07-01 ENCOUNTER — OFFICE VISIT (OUTPATIENT)
Age: 74
End: 2024-07-01
Payer: MEDICARE

## 2024-07-01 VITALS
SYSTOLIC BLOOD PRESSURE: 154 MMHG | TEMPERATURE: 97 F | BODY MASS INDEX: 46.33 KG/M2 | WEIGHT: 236 LBS | DIASTOLIC BLOOD PRESSURE: 71 MMHG | OXYGEN SATURATION: 97 % | HEART RATE: 79 BPM | HEIGHT: 60 IN

## 2024-07-01 DIAGNOSIS — E66.01 MORBID OBESITY (HCC): ICD-10-CM

## 2024-07-01 DIAGNOSIS — K91.2 POSTSURGICAL MALABSORPTION: Primary | ICD-10-CM

## 2024-07-01 DIAGNOSIS — Z98.84 S/P GASTRIC BYPASS: ICD-10-CM

## 2024-07-01 PROCEDURE — 1090F PRES/ABSN URINE INCON ASSESS: CPT | Performed by: REGISTERED NURSE

## 2024-07-01 PROCEDURE — G8427 DOCREV CUR MEDS BY ELIG CLIN: HCPCS | Performed by: REGISTERED NURSE

## 2024-07-01 PROCEDURE — 99212 OFFICE O/P EST SF 10 MIN: CPT | Performed by: REGISTERED NURSE

## 2024-07-01 PROCEDURE — 3078F DIAST BP <80 MM HG: CPT | Performed by: REGISTERED NURSE

## 2024-07-01 PROCEDURE — G8399 PT W/DXA RESULTS DOCUMENT: HCPCS | Performed by: REGISTERED NURSE

## 2024-07-01 PROCEDURE — G8417 CALC BMI ABV UP PARAM F/U: HCPCS | Performed by: REGISTERED NURSE

## 2024-07-01 PROCEDURE — 1123F ACP DISCUSS/DSCN MKR DOCD: CPT | Performed by: REGISTERED NURSE

## 2024-07-01 PROCEDURE — 3017F COLORECTAL CA SCREEN DOC REV: CPT | Performed by: REGISTERED NURSE

## 2024-07-01 PROCEDURE — 3077F SYST BP >= 140 MM HG: CPT | Performed by: REGISTERED NURSE

## 2024-07-01 PROCEDURE — 1036F TOBACCO NON-USER: CPT | Performed by: REGISTERED NURSE

## 2024-07-01 ASSESSMENT — ENCOUNTER SYMPTOMS
GASTROINTESTINAL NEGATIVE: 1
CHEST TIGHTNESS: 0
SHORTNESS OF BREATH: 0

## 2024-07-01 NOTE — PROGRESS NOTES
Bariatric Postoperative Nurse Note    Cyndee Jj is a 73 y.o. female status post laparoscopic gastric bypass surgery performed on 1/11/23.    All Post-Ops (including two weeks)  -# of grams of protein daily? Unknown   -sources of protein? Chicken, beef, pork   -# of oz of sugar free fluids from all sources daily? 64 oz   -Nausea? No  -Vomiting? No  -Difficulty swallow/food sticking? No  -Heartburn/regurgitation? Yes, occ triggered by certain foods  -Character of bowel movements (diarrhea/constipation/bloody stools?) regular  -Which multivitamin product are you taking? Procare   -What dose and how frequently are you taking calcium citrate? TID  - from any iron-containing multivitamin by 2 hours? Yes  -Ulcer risk exposures:   NSAID No  Tobacco No  Alcohol No  Steroids No  -Minutes of physical activity and what type?  As tolerated.  Chronic knee pain.  Followed by ortho.

## 2024-07-01 NOTE — PATIENT INSTRUCTIONS
Dr. SID Joseph  [facility] Plastic Surgery  Phone: [phone]  [address] [place] [address]    Dr. Sergio Jose  [place] [facility]  Phone: [phone] [facility], [address]  [place] [address]  [address] [facility], [address]  [place] [address]    Dr. Alex Stevenson [facility]  Phone: [phone] [address] [facility]  [place] [address]    Dr. Domonique Stack  [facility]  Phone [phone] [address] [place] [address]    [address] [facility] [address]  [place] [address]    Dr. Pradip Johnston  [address], [address]  [place], [place] [address] [phone]    Dr. CHRISTINA Marsh  [address] [place], [place] [address] [phone]    Dr. Ricky Moses  [facility]   [address] [facility] [address]  [place], [place] [address] [phone]    [facility] Surgery  Multiple locations in [place], [place]  [phone]

## 2024-07-01 NOTE — PROGRESS NOTES
Bariatric Postoperative Progress Note    Chief Complaint   Patient presents with    Follow-up     LGBP 1/11/23     Cyndee Jj is a 73 y.o. female now 1 year 6 months status post laparoscopic gastric bypass surgery performed on 1/11/23.  Accompanied by daughter.    Here for annual follow up. Weight remigio: 195 lbs. Was hospitalized in December 2023 for endocarditis, then admitted to a rehab facility for 2 months. Currently in PT/OT.  Doing well overall, but experiencing weight regain. Expressed interest in panniculectomy. Excess abdominal skin extends to knees while sitting. Hx of panniculitis.         7/1/2024     9:55 AM 7/1/2024     9:00 AM 5/30/2024    10:37 AM 4/19/2024    10:36 AM 3/22/2024    10:54 AM 11/3/2023    10:48 AM 11/3/2023    10:00 AM   Ambulatory Bariatric Summary   Systolic 154 147 123 120 120 149 148   Diastolic 71 73 75 59 59 63 64   Pulse  79 79  78  67   Temp  97 °F (36.1 °C) 97 °F (36.1 °C)  97.2 °F (36.2 °C)  97 °F (36.1 °C)   Respirations   16       Weight - Scale  236 233 244 244  244   Height  1.524 m (5') 1.524 m (5') 1.524 m (5') 1.524 m (5')  1.524 m (5')   BMI  46.2 kg/m2 45.6 kg/m2 47.8 kg/m2 47.8 kg/m2  47.8 kg/m2   Weight - Scale  107 kg (236 lb) 105.7 kg (233 lb) 110.7 kg (244 lb) 110.7 kg (244 lb)  110.7 kg (244 lb)   BMI (Calculated)  46.2 45.6 47.8 47.8  47.8     Last Weight Metrics:      7/1/2024     9:00 AM 5/30/2024    10:37 AM 4/19/2024    10:36 AM 3/22/2024    10:54 AM 11/3/2023    10:00 AM 8/2/2023    10:00 AM 4/28/2023    10:06 AM   Weight Loss Metrics   Pre-op weight (manual entry) 314 lbs    314 lbs 314 lbs 314 lbs   Height 5' 0\" 5' 0\" 5' 0\" 5' 0\" 5' 0\" 5' 1\" 5' 1\"   Weight - Scale 236 lbs 233 lbs 244 lbs 244 lbs 244 lbs 239 lbs 253 lbs   BMI (Calculated) 46.2 kg/m2 45.6 kg/m2 47.8 kg/m2 47.8 kg/m2 47.8 kg/m2 45.3 kg/m2 47.9 kg/m2   Ideal body weight (manual entry) 120 lbs    120 lbs 120 lbs 120 lbs   EBW in lbs. 194    194 194 194   Goal weight       159 lbs

## 2024-10-25 ENCOUNTER — OFFICE VISIT (OUTPATIENT)
Age: 74
End: 2024-10-25

## 2024-10-25 VITALS
HEIGHT: 60 IN | HEART RATE: 89 BPM | TEMPERATURE: 96.9 F | OXYGEN SATURATION: 98 % | DIASTOLIC BLOOD PRESSURE: 59 MMHG | SYSTOLIC BLOOD PRESSURE: 120 MMHG | WEIGHT: 212 LBS | BODY MASS INDEX: 41.62 KG/M2

## 2024-10-25 DIAGNOSIS — E78.00 HYPERCHOLESTEREMIA: ICD-10-CM

## 2024-10-25 DIAGNOSIS — I10 PRIMARY HYPERTENSION: ICD-10-CM

## 2024-10-25 DIAGNOSIS — R09.89 BRUIT OF RIGHT CAROTID ARTERY: ICD-10-CM

## 2024-10-25 DIAGNOSIS — I73.9 CLAUDICATION (HCC): ICD-10-CM

## 2024-10-25 DIAGNOSIS — I50.32 CHRONIC DIASTOLIC (CONGESTIVE) HEART FAILURE (HCC): ICD-10-CM

## 2024-10-25 DIAGNOSIS — E66.01 CLASS 3 SEVERE OBESITY DUE TO EXCESS CALORIES WITH SERIOUS COMORBIDITY AND BODY MASS INDEX (BMI) OF 45.0 TO 49.9 IN ADULT: ICD-10-CM

## 2024-10-25 DIAGNOSIS — R94.31 ABNORMAL ECG: ICD-10-CM

## 2024-10-25 DIAGNOSIS — I20.89 ATYPICAL ANGINA (HCC): ICD-10-CM

## 2024-10-25 DIAGNOSIS — I33.0 CHRONIC BACTERIAL ENDOCARDITIS: ICD-10-CM

## 2024-10-25 DIAGNOSIS — R01.1 SYSTOLIC MURMUR: ICD-10-CM

## 2024-10-25 DIAGNOSIS — E66.813 CLASS 3 SEVERE OBESITY DUE TO EXCESS CALORIES WITH SERIOUS COMORBIDITY AND BODY MASS INDEX (BMI) OF 45.0 TO 49.9 IN ADULT: ICD-10-CM

## 2024-10-25 DIAGNOSIS — R06.02 EXERTIONAL SHORTNESS OF BREATH: Primary | ICD-10-CM

## 2024-10-25 RX ORDER — ACETAMINOPHEN 160 MG
2000 TABLET,DISINTEGRATING ORAL DAILY
COMMUNITY
Start: 2024-09-22

## 2024-10-25 RX ORDER — DICYCLOMINE HYDROCHLORIDE 10 MG/1
CAPSULE ORAL
COMMUNITY
Start: 2024-09-10

## 2024-10-25 RX ORDER — HYDROMORPHONE HYDROCHLORIDE 4 MG/1
TABLET ORAL
COMMUNITY

## 2024-10-25 ASSESSMENT — ENCOUNTER SYMPTOMS
EYES NEGATIVE: 1
ALLERGIC/IMMUNOLOGIC NEGATIVE: 1
GASTROINTESTINAL NEGATIVE: 1
SHORTNESS OF BREATH: 0

## 2024-10-25 ASSESSMENT — PATIENT HEALTH QUESTIONNAIRE - PHQ9
SUM OF ALL RESPONSES TO PHQ9 QUESTIONS 1 & 2: 0
2. FEELING DOWN, DEPRESSED OR HOPELESS: NOT AT ALL
SUM OF ALL RESPONSES TO PHQ QUESTIONS 1-9: 0
SUM OF ALL RESPONSES TO PHQ QUESTIONS 1-9: 0
1. LITTLE INTEREST OR PLEASURE IN DOING THINGS: NOT AT ALL
SUM OF ALL RESPONSES TO PHQ QUESTIONS 1-9: 0
SUM OF ALL RESPONSES TO PHQ QUESTIONS 1-9: 0

## 2024-10-25 NOTE — PROGRESS NOTES
1. \"Have you been to the ER, urgent care clinic since your last visit?  Hospitalized since your last visit?\" Reviewed by Dr. Roland Spear    2. \"Have you seen or consulted any other health care providers outside of the [facility] since your last visit?\" Reviewed by Dr. Roland Spear  
Doppler study will be ordered.    8. Claudication.  A lower extremity arterial PVR will be ordered. The carotid study will be conducted within the next 1 to 2 weeks.    9. Dizziness.  The dizziness is likely due to autonomic neuropathy secondary to diabetes. Orthostasis is also considered. Blood flow to the brain will be assessed through a carotid Doppler study. If the blood flow is normal, the dizziness is attributed to autonomic neuropathy, and the patient is advised to get up more slowly to manage symptoms.      Results  Laboratory Studies  Hemoglobin A1c was 6.6 in May.  1. Exertional shortness of breath  2. Chronic diastolic (congestive) heart failure (HCC)  3. Atypical angina (HCC)  4. Primary hypertension  5. Systolic murmur  6. Hypercholesteremia  7. Class 3 severe obesity due to excess calories with serious comorbidity and body mass index (BMI) of 45.0 to 49.9 in adult  8. Abnormal ECG  9. Chronic bacterial endocarditis  10. Claudication (HCC)  -     Vascular lower arterial complete physiologic Doppler at rest; Future  11. Bruit of right carotid artery  -     Vascular duplex carotid bilateral; Future         No results found for any visits on 10/25/24.     Return in about 7 months (around 5/25/2025) for Follow up with echo.      On this date 10/25/2024 I have spent 41 minutes reviewing previous notes, test results and face to face with the patient discussing the diagnosis and importance of compliance with the treatment plan as well as documenting on the day of the visit.    The patient (or guardian, if applicable) and other individuals in attendance with the patient were advised that Artificial Intelligence will be utilized during this visit to record, process the conversation to generate a clinical note and to support improvement of the AI technology. The patient (or guardian, if applicable) and other individuals in attendance at the appointment consented to the use of AI, including the recording.       An

## 2024-11-25 ENCOUNTER — OFFICE VISIT (OUTPATIENT)
Age: 74
End: 2024-11-25
Payer: MEDICARE

## 2024-11-25 VITALS
SYSTOLIC BLOOD PRESSURE: 139 MMHG | TEMPERATURE: 98 F | WEIGHT: 201 LBS | DIASTOLIC BLOOD PRESSURE: 66 MMHG | OXYGEN SATURATION: 100 % | HEIGHT: 60 IN | HEART RATE: 65 BPM | BODY MASS INDEX: 39.46 KG/M2

## 2024-11-25 DIAGNOSIS — Z98.84 GENERALIZED EXCESS AND REDUNDANT SKIN AFTER BARIATRIC SURGERY: ICD-10-CM

## 2024-11-25 DIAGNOSIS — E66.9 OBESITY (BMI 30-39.9): ICD-10-CM

## 2024-11-25 DIAGNOSIS — K91.2 POSTSURGICAL MALABSORPTION: Primary | ICD-10-CM

## 2024-11-25 DIAGNOSIS — Z98.84 S/P GASTRIC BYPASS: ICD-10-CM

## 2024-11-25 DIAGNOSIS — L98.7 GENERALIZED EXCESS AND REDUNDANT SKIN AFTER BARIATRIC SURGERY: ICD-10-CM

## 2024-11-25 PROCEDURE — 99214 OFFICE O/P EST MOD 30 MIN: CPT | Performed by: REGISTERED NURSE

## 2024-11-25 PROCEDURE — 3017F COLORECTAL CA SCREEN DOC REV: CPT | Performed by: REGISTERED NURSE

## 2024-11-25 PROCEDURE — 3078F DIAST BP <80 MM HG: CPT | Performed by: REGISTERED NURSE

## 2024-11-25 PROCEDURE — G8417 CALC BMI ABV UP PARAM F/U: HCPCS | Performed by: REGISTERED NURSE

## 2024-11-25 PROCEDURE — G8484 FLU IMMUNIZE NO ADMIN: HCPCS | Performed by: REGISTERED NURSE

## 2024-11-25 PROCEDURE — G8399 PT W/DXA RESULTS DOCUMENT: HCPCS | Performed by: REGISTERED NURSE

## 2024-11-25 PROCEDURE — 1090F PRES/ABSN URINE INCON ASSESS: CPT | Performed by: REGISTERED NURSE

## 2024-11-25 PROCEDURE — 3075F SYST BP GE 130 - 139MM HG: CPT | Performed by: REGISTERED NURSE

## 2024-11-25 PROCEDURE — G8427 DOCREV CUR MEDS BY ELIG CLIN: HCPCS | Performed by: REGISTERED NURSE

## 2024-11-25 PROCEDURE — 1036F TOBACCO NON-USER: CPT | Performed by: REGISTERED NURSE

## 2024-11-25 PROCEDURE — 1159F MED LIST DOCD IN RCRD: CPT | Performed by: REGISTERED NURSE

## 2024-11-25 PROCEDURE — 1160F RVW MEDS BY RX/DR IN RCRD: CPT | Performed by: REGISTERED NURSE

## 2024-11-25 PROCEDURE — 1123F ACP DISCUSS/DSCN MKR DOCD: CPT | Performed by: REGISTERED NURSE

## 2024-11-25 ASSESSMENT — ENCOUNTER SYMPTOMS
SHORTNESS OF BREATH: 0
CHEST TIGHTNESS: 0
GASTROINTESTINAL NEGATIVE: 1

## 2024-11-25 NOTE — PROGRESS NOTES
Bariatric Postoperative Nurse Note      Cyndee Jj is a 74 y.o. female status post laparoscopic gastric bypass surgery performed on 1/11/23.      All Post-Ops (including two weeks)  -# of grams of protein daily? Unknown, encouraged to track  -sources of protein? Chicken, beef, pork   -# of oz of sugar free fluids from all sources daily? 64 oz   -Nausea? Yes, occ related to heartburn  -Vomiting? No  -Difficulty swallow/food sticking? No  -Heartburn/regurgitation? Yes, occ triggered by certain foods  -Character of bowel movements (diarrhea/constipation/bloody stools?) regular  -Which multivitamin product are you taking? Procare   -What dose and how frequently are you taking calcium citrate? TID  - from any iron-containing multivitamin by 2 hours? Yes  -Ulcer risk exposures:   NSAID No  Tobacco No  Alcohol No  Steroids No  -Minutes of physical activity and what type? No, due to limited mobility related to chronic arthralgias.   
pedis  mmHg    Left peroneal  mmHg    Left toe pressure 130 mmHg    Left TBI 1.02      Assessment/Plan:   She is currently 1 year 11 months s/p laparoscopic gastric bypass surgery with a total weight loss of 113 lbs to date.    Annual bariatric labs ordered. Will review at next follow up.     Continue MVI/B1/B12/harshad/vit D supplementation.    Discussed that experiencing loose or sagging skin after bariatric surgery is a natural consequence of the body’s rapid and substantial weight reduction. When skin is stretched for an extended period due to excessive weight, its elasticity can be compromised. While the body adjusts to its new contours over time, the excess skin can remain, causing discomfort and affecting body image. Will refer to plastics if desired but discussed that it may not be covered by insurance. Pt verbalized understanding.     We reviewed the components of a successful postoperative course including requirement for a high protein, low carbohydrate diet, 64 oz a day of zero calorie liquids, daily vitamin supplementation, daily exercise (150 mins/week), regular follow-up, and participation in support groups.    Schedule with registered dietitian for more detailed medical nutrition therapy as needed. Referred to Rocío LOWE for back on track.     The primary encounter diagnosis was Postsurgical malabsorption. Diagnoses of S/P gastric bypass, Obesity (BMI 30-39.9), and Generalized excess and redundant skin after bariatric surgery were also pertinent to this visit.     Return in about 2 months (around 1/25/2025) for Weight management, Annual follow up.    With labs, sooner as needed.  JOHANN Cruz - NP

## 2024-12-30 NOTE — TELEPHONE ENCOUNTER
PCP: Luana Valverde MD    Last appt:  10/25/2024   Future Appointments   Date Time Provider Department Center   1/20/2025 11:10 AM Marcela Sotelo APRN - NP [facility] AMB   6/2/2025 10:30 AM BS [facility] ECHO 2 [facility] AM[facility]   6/2/2025 11:15 AM Roland Spear Sr., MD [facility] AMB       Requested Prescriptions     Pending Prescriptions Disp Refills    JARDIANCE 10 MG tablet [Pharmacy Med Name: JARDIANCE 10MG TABLETS] 30 tablet 4     Sig: TAKE 1 TABLET BY MOUTH DAILY       Request for a 30 or 90 day supply? Provider Discretion    Pharmacy: Confirmed     Other Comments: N/A

## 2024-12-31 RX ORDER — EMPAGLIFLOZIN 10 MG/1
10 TABLET, FILM COATED ORAL DAILY
Qty: 30 TABLET | Refills: 11 | Status: SHIPPED | OUTPATIENT
Start: 2024-12-31

## 2025-01-20 ENCOUNTER — OFFICE VISIT (OUTPATIENT)
Age: 75
End: 2025-01-20
Payer: MEDICARE

## 2025-01-20 ENCOUNTER — CLINICAL DOCUMENTATION (OUTPATIENT)
Facility: HOSPITAL | Age: 75
End: 2025-01-20

## 2025-01-20 VITALS
BODY MASS INDEX: 42.6 KG/M2 | WEIGHT: 217 LBS | HEART RATE: 84 BPM | TEMPERATURE: 97 F | HEIGHT: 60 IN | OXYGEN SATURATION: 99 % | DIASTOLIC BLOOD PRESSURE: 74 MMHG | SYSTOLIC BLOOD PRESSURE: 171 MMHG

## 2025-01-20 DIAGNOSIS — Z98.84 GENERALIZED EXCESS AND REDUNDANT SKIN AFTER BARIATRIC SURGERY: ICD-10-CM

## 2025-01-20 DIAGNOSIS — E11.65 TYPE 2 DIABETES MELLITUS WITH HYPERGLYCEMIA, WITHOUT LONG-TERM CURRENT USE OF INSULIN (HCC): ICD-10-CM

## 2025-01-20 DIAGNOSIS — L98.7 GENERALIZED EXCESS AND REDUNDANT SKIN AFTER BARIATRIC SURGERY: ICD-10-CM

## 2025-01-20 DIAGNOSIS — G47.30 SLEEP APNEA, UNSPECIFIED TYPE: ICD-10-CM

## 2025-01-20 DIAGNOSIS — Z98.84 WEIGHT GAIN STATUS POST GASTRIC BYPASS: ICD-10-CM

## 2025-01-20 DIAGNOSIS — R63.5 WEIGHT GAIN STATUS POST GASTRIC BYPASS: ICD-10-CM

## 2025-01-20 DIAGNOSIS — E66.01 MORBID OBESITY WITH BMI OF 40.0-44.9, ADULT: Primary | ICD-10-CM

## 2025-01-20 DIAGNOSIS — Z98.84 S/P GASTRIC BYPASS: ICD-10-CM

## 2025-01-20 DIAGNOSIS — Z71.3 ENCOUNTER FOR WEIGHT LOSS COUNSELING: ICD-10-CM

## 2025-01-20 PROCEDURE — 3046F HEMOGLOBIN A1C LEVEL >9.0%: CPT | Performed by: REGISTERED NURSE

## 2025-01-20 PROCEDURE — 2022F DILAT RTA XM EVC RTNOPTHY: CPT | Performed by: REGISTERED NURSE

## 2025-01-20 PROCEDURE — 1036F TOBACCO NON-USER: CPT | Performed by: REGISTERED NURSE

## 2025-01-20 PROCEDURE — 3017F COLORECTAL CA SCREEN DOC REV: CPT | Performed by: REGISTERED NURSE

## 2025-01-20 PROCEDURE — G8427 DOCREV CUR MEDS BY ELIG CLIN: HCPCS | Performed by: REGISTERED NURSE

## 2025-01-20 PROCEDURE — G8417 CALC BMI ABV UP PARAM F/U: HCPCS | Performed by: REGISTERED NURSE

## 2025-01-20 PROCEDURE — 1160F RVW MEDS BY RX/DR IN RCRD: CPT | Performed by: REGISTERED NURSE

## 2025-01-20 PROCEDURE — G8399 PT W/DXA RESULTS DOCUMENT: HCPCS | Performed by: REGISTERED NURSE

## 2025-01-20 PROCEDURE — 1123F ACP DISCUSS/DSCN MKR DOCD: CPT | Performed by: REGISTERED NURSE

## 2025-01-20 PROCEDURE — 1090F PRES/ABSN URINE INCON ASSESS: CPT | Performed by: REGISTERED NURSE

## 2025-01-20 PROCEDURE — 3078F DIAST BP <80 MM HG: CPT | Performed by: REGISTERED NURSE

## 2025-01-20 PROCEDURE — 3077F SYST BP >= 140 MM HG: CPT | Performed by: REGISTERED NURSE

## 2025-01-20 PROCEDURE — 1159F MED LIST DOCD IN RCRD: CPT | Performed by: REGISTERED NURSE

## 2025-01-20 PROCEDURE — G2211 COMPLEX E/M VISIT ADD ON: HCPCS | Performed by: REGISTERED NURSE

## 2025-01-20 PROCEDURE — 99214 OFFICE O/P EST MOD 30 MIN: CPT | Performed by: REGISTERED NURSE

## 2025-01-20 RX ORDER — TIRZEPATIDE 2.5 MG/.5ML
2.5 INJECTION, SOLUTION SUBCUTANEOUS
Qty: 2 ML | Refills: 0 | Status: SHIPPED | OUTPATIENT
Start: 2025-01-20

## 2025-01-20 ASSESSMENT — ENCOUNTER SYMPTOMS
CHEST TIGHTNESS: 0
SHORTNESS OF BREATH: 0
GASTROINTESTINAL NEGATIVE: 1

## 2025-01-20 NOTE — PROGRESS NOTES
MYNOR has called and LVM and has sent an e-mail to set up a Back on Track appointment.  Rocío Contreras RD

## 2025-01-20 NOTE — PROGRESS NOTES
Bariatric Postoperative Nurse Note    Cyndee Jj is a 74 y.o. female status post laparoscopic gastric bypass surgery performed on 1/11/23.    All Post-Ops (including two weeks)  -# of grams of protein daily? Unknown   -sources of protein? Chicken, beef, pork   -# of oz of sugar free fluids from all sources daily? 64 oz   -Nausea? Yes, occ related to food aversion.  -Vomiting? No  -Difficulty swallow/food sticking? No  -Heartburn/regurgitation? Yes, occ triggered by certain foods.  -Character of bowel movements (diarrhea/constipation/bloody stools?) regular  -Which multivitamin product are you taking? Procare   -What dose and how frequently are you taking calcium citrate? TID  - from any iron-containing multivitamin by 2 hours? Yes  -Ulcer risk exposures:   NSAID No  Tobacco No  Alcohol No  Steroids No  -Minutes of physical activity and what type? PT exercises

## 2025-01-20 NOTE — PATIENT INSTRUCTIONS
Dr. SID Joseph  [facility] Plastic Surgery  Phone: [phone]  [address] [place] [address]    Dr. Sergio Jose  [place] [facility]  Phone: [phone] [facility], [address]  [place] [address]  [address] [facility], [address]  [place] [address]    Dr. Alex Stevenson [facility]  Phone: [phone] [address] [facility]  [place] [address]    Dr. Domonique Stack  [facility]  Phone [phone] [address] [place] [address]    [address] [facility] [address]  [place] [address]    Dr. Pradip Johnston  [address] [facility], [address]  [place], [place] [id number] [phone]    Dr. CHRISTINA Marsh  [address] [place], [place] [address] [phone]    Dr. Ricky Moses  [facility]   [address] [facility] [address]  [place], [place] [address] [phone]    [facility] Plastic Surgery  Multiple locations in [place], [place]  [phone]

## 2025-01-20 NOTE — PROGRESS NOTES
Bariatric Postoperative Progress Note    Chief Complaint   Patient presents with    Weight Management     Annual follow up, LGBP 1/11/23     Cyndee Jj is a 74 y.o. female now 2 years status post laparoscopic gastric bypass surgery performed on 1/11/23.    Here for annual follow up and weight management. Admits to increased sweet cravings. +16 lbs since last visit.     See nurse note for additional subjective information.         1/20/2025    11:21 AM 1/20/2025    11:00 AM 11/25/2024    10:00 AM 10/29/2024    11:45 AM 10/29/2024    11:40 AM 10/25/2024    11:52 AM 7/1/2024     9:55 AM   Ambulatory Bariatric Summary   Systolic 171 169 139 118 128 120 154   Diastolic 74 85 66 60 60 59 71   Pulse  84 65   89    Temp  97 °F (36.1 °C) 98 °F (36.7 °C)   96.9 °F (36.1 °C)    Weight - Scale  217 201   212    Height  1.524 m (5') 1.524 m (5')   1.524 m (5')    BMI  42.5 kg/m2 39.3 kg/m2   41.5 kg/m2    Weight - Scale  98.4 kg (217 lb) 91.2 kg (201 lb)   96.2 kg (212 lb)    BMI (Calculated)  42.5 39.3   41.5      Last Weight Metrics:      1/20/2025    11:00 AM 11/25/2024    10:00 AM 10/25/2024    11:52 AM 7/1/2024     9:00 AM 5/30/2024    10:37 AM 4/19/2024    10:36 AM 3/22/2024    10:54 AM   Weight Loss Metrics   Pre-op weight (manual entry) 314 lbs 314 lbs  314 lbs      Height 5' 0\" 5' 0\" 5' 0\" 5' 0\" 5' 0\" 5' 0\" 5' 0\"   Weight - Scale 217 lbs 201 lbs 212 lbs 236 lbs 233 lbs 244 lbs 244 lbs   BMI (Calculated) 42.5 kg/m2 39.3 kg/m2 41.5 kg/m2 46.2 kg/m2 45.6 kg/m2 47.8 kg/m2 47.8 kg/m2   Ideal body weight (manual entry) 120 lbs 120 lbs  120 lbs      EBW in lbs. 194 194  194      Weight loss to date in lbs. 97 113  78      Percent weight loss (%) 30.89 % 35.99 %  24.84 %      Percent EBW loss (%) 50 % 58.2 %  40.2 %        Comorbidities:  Hypertension: resolved  Diabetes: resolved A1c 5.8  Obstructive Sleep Apnea: improved, follow up with sleep specialist  Hyperlipidemia: resolved  Stress Urinary Incontinence: not

## 2025-01-21 ENCOUNTER — TELEPHONE (OUTPATIENT)
Age: 75
End: 2025-01-21

## 2025-02-05 ENCOUNTER — CLINICAL DOCUMENTATION (OUTPATIENT)
Facility: HOSPITAL | Age: 75
End: 2025-02-05

## 2025-02-05 NOTE — PROGRESS NOTES
RD called at set appointment time but no one answered.  MYNOR sent an e-mail to try to reschedule the [facility] appointment.   Rocío Contreras RD

## 2025-02-11 ENCOUNTER — HOSPITAL ENCOUNTER (OUTPATIENT)
Facility: HOSPITAL | Age: 75
Discharge: HOME | End: 2025-02-14

## 2025-02-11 ENCOUNTER — CLINICAL DOCUMENTATION (OUTPATIENT)
Facility: HOSPITAL | Age: 75
End: 2025-02-11

## 2025-02-11 NOTE — PROGRESS NOTES
[facility]  [address], [address]      Patient's Name: Cyndee Jj     Age: 74 y.o.  YOB: 1950     Sex: female    Date:   2/11/2025    Height: 5'   Weight:    217      Lbs.   Personal goal weight:  175#    BMI: 42     Surgery Date: 01/27/2023    Surgeon: Dr. Snider    Starting Weight: 332     Overall Pounds Lost: 115     Procedure: LGBP    Lowest Weight patient has achieved since surgery: 198      Diet History (reported by patient on diet history form)    Do you smoke: no    Meals per day: 2-3    Diet History:    Wake up:  8:00   Breakfast:  9:00 banana  Lunch:  tuna with light cintron in it.  Dinner:  5:00-6:00  chicken with broccoli.      After dinner maybe peanut butter.     Intake of Eating out:  Not often.   Fish [place].    Simple sugar intake: now and then.      Ounces of fluid consumed per day: 64 ounces.     Fluids being consumed: water.      Exercise History    Patient is doing chair exercises.  Her knees are bone on bone.        Behavior:      Patient is taking 30 minutes to eat a meal.    Patient is stopping fluid 30 minutes before and after a meal and no fluid with her meal.    Vitamins    Patient is taking Procare capsules Multivitamins per day    Patient is taking Calcium in the form of calcium citrate.  Patient is taking 500 mg three per day.    Patient is getting 20 mg of B1 and 1000 mcg of B12 with multivitamin and 4500 IU of Vitamin D with calcium and multivitamin.       Summary: Weight loss is at 115 pounds.  I have reinforced the key diet principles to the patient.  Patient was instructed to follow a 3 meal a day routine.  I have reinforced the importance of filling up on meat and vegetables and avoiding carbohydrates.  I have talked with patient about reactive hypoglycemia and although carbohydrates are not good from a weight-management point of view, they are actually very dangerous and should be

## 2025-05-13 ENCOUNTER — HOSPITAL ENCOUNTER (OUTPATIENT)
Facility: HOSPITAL | Age: 75
Discharge: HOME | End: 2025-05-16

## 2025-05-23 DIAGNOSIS — R06.02 EXERTIONAL SHORTNESS OF BREATH: Primary | ICD-10-CM

## 2025-05-23 DIAGNOSIS — I20.89 ATYPICAL ANGINA: ICD-10-CM

## 2025-05-23 DIAGNOSIS — I50.32 CHRONIC DIASTOLIC (CONGESTIVE) HEART FAILURE (HCC): ICD-10-CM

## 2025-05-23 DIAGNOSIS — I10 PRIMARY HYPERTENSION: ICD-10-CM

## 2025-06-02 ENCOUNTER — CLINICAL DOCUMENTATION (OUTPATIENT)
Facility: HOSPITAL | Age: 75
End: 2025-06-02

## 2025-06-02 NOTE — PROGRESS NOTES
MYNOR reached out to patient to discuss previously set SMART goals.  Patient has done well with lifestyle changes.  Future SMART goals are:   Goal 207.  Chair exercises.  Crystal light Pure.    We are set to talk in the future.  Rocío Contreras RD

## 2025-07-07 ENCOUNTER — OFFICE VISIT (OUTPATIENT)
Age: 75
End: 2025-07-07
Payer: MEDICARE

## 2025-07-07 VITALS
DIASTOLIC BLOOD PRESSURE: 58 MMHG | WEIGHT: 216 LBS | BODY MASS INDEX: 42.41 KG/M2 | HEART RATE: 73 BPM | TEMPERATURE: 97.1 F | HEIGHT: 60 IN | SYSTOLIC BLOOD PRESSURE: 108 MMHG | OXYGEN SATURATION: 96 %

## 2025-07-07 DIAGNOSIS — I33.0 CHRONIC BACTERIAL ENDOCARDITIS: ICD-10-CM

## 2025-07-07 DIAGNOSIS — R94.31 ABNORMAL EKG: ICD-10-CM

## 2025-07-07 DIAGNOSIS — R42 DIZZINESS: ICD-10-CM

## 2025-07-07 DIAGNOSIS — I10 PRIMARY HYPERTENSION: ICD-10-CM

## 2025-07-07 DIAGNOSIS — G47.33 OBSTRUCTIVE SLEEP APNEA SYNDROME: ICD-10-CM

## 2025-07-07 DIAGNOSIS — I26.99 PULMONARY EMBOLISM, UNSPECIFIED CHRONICITY, UNSPECIFIED PULMONARY EMBOLISM TYPE, UNSPECIFIED WHETHER ACUTE COR PULMONALE PRESENT (HCC): ICD-10-CM

## 2025-07-07 DIAGNOSIS — E78.00 HYPERCHOLESTEROLEMIA: ICD-10-CM

## 2025-07-07 DIAGNOSIS — I50.32 CHRONIC DIASTOLIC CHF (CONGESTIVE HEART FAILURE) (HCC): Primary | ICD-10-CM

## 2025-07-07 PROCEDURE — G8399 PT W/DXA RESULTS DOCUMENT: HCPCS

## 2025-07-07 PROCEDURE — 1126F AMNT PAIN NOTED NONE PRSNT: CPT

## 2025-07-07 PROCEDURE — 1123F ACP DISCUSS/DSCN MKR DOCD: CPT

## 2025-07-07 PROCEDURE — 3074F SYST BP LT 130 MM HG: CPT

## 2025-07-07 PROCEDURE — G8417 CALC BMI ABV UP PARAM F/U: HCPCS

## 2025-07-07 PROCEDURE — G8428 CUR MEDS NOT DOCUMENT: HCPCS

## 2025-07-07 PROCEDURE — 1090F PRES/ABSN URINE INCON ASSESS: CPT

## 2025-07-07 PROCEDURE — 99214 OFFICE O/P EST MOD 30 MIN: CPT

## 2025-07-07 PROCEDURE — 3078F DIAST BP <80 MM HG: CPT

## 2025-07-07 PROCEDURE — 3017F COLORECTAL CA SCREEN DOC REV: CPT

## 2025-07-07 PROCEDURE — 1036F TOBACCO NON-USER: CPT

## 2025-07-07 RX ORDER — ROSUVASTATIN CALCIUM 10 MG/1
10 TABLET, COATED ORAL NIGHTLY
COMMUNITY
Start: 2025-06-04

## 2025-07-07 RX ORDER — LOSARTAN POTASSIUM 25 MG/1
TABLET ORAL
COMMUNITY
Start: 2025-04-29

## 2025-07-07 ASSESSMENT — ENCOUNTER SYMPTOMS
SHORTNESS OF BREATH: 1
COUGH: 0
SORE THROAT: 0
NAUSEA: 0
WHEEZING: 0
RHINORRHEA: 0
ABDOMINAL PAIN: 0
DIARRHEA: 0
VOMITING: 0

## 2025-07-07 ASSESSMENT — PATIENT HEALTH QUESTIONNAIRE - PHQ9
1. LITTLE INTEREST OR PLEASURE IN DOING THINGS: NOT AT ALL
SUM OF ALL RESPONSES TO PHQ QUESTIONS 1-9: 0
2. FEELING DOWN, DEPRESSED OR HOPELESS: NOT AT ALL
SUM OF ALL RESPONSES TO PHQ QUESTIONS 1-9: 0

## 2025-07-07 NOTE — PATIENT INSTRUCTIONS
Recommendations:  -Low salt diet - less than 2g daily   -Low fat diet   -Exercise, weight loss  -Leg elevation    Patient Education        DASH Diet: Care Instructions  Your Care Instructions     The DASH diet is an eating plan that can help lower your blood pressure. DASH stands for Dietary Approaches to Stop Hypertension. Hypertension is high blood pressure.  The DASH diet focuses on eating foods that are high in calcium, potassium, and magnesium. These nutrients can lower blood pressure. The foods that are highest in these nutrients are fruits, vegetables, low-fat dairy products, nuts, seeds, and legumes. But taking calcium, potassium, and magnesium supplements instead of eating foods that are high in those nutrients does not have the same effect. The DASH diet also includes whole grains, fish, and poultry.  The DASH diet is one of several lifestyle changes your doctor may recommend to lower your high blood pressure. Your doctor may also want you to decrease the amount of sodium in your diet. Lowering sodium while following the DASH diet can lower blood pressure even further than just the DASH diet alone.  Follow-up care is a key part of your treatment and safety. Be sure to make and go to all appointments, and call your doctor if you are having problems. It's also a good idea to know your test results and keep a list of the medicines you take.  How can you care for yourself at home?  Following the DASH diet  Eat 4 to 5 servings of fruit each day. A serving is 1 medium-sized piece of fruit, 1/2 cup raw or canned fruit, 1/4 cup dried fruit, or 4 ounces (1/2 cup) of fruit juice. Choose fruit more often than fruit juice.  Eat 4 to 5 servings of vegetables each day. A serving is 1 cup of lettuce or raw leafy vegetables, 1/2 cup of chopped or cooked vegetables, or 4 ounces (1/2 cup) of vegetable juice. Choose vegetables more often than vegetable juice.  Get 2 to 3 servings of low-fat and fat-free dairy each day. A

## 2025-07-07 NOTE — PROGRESS NOTES
1. \"Have you been to the ER, urgent care clinic since your last visit?  Hospitalized since your last visit?\" Reviewed by DERICK Franco    2. \"Have you seen or consulted any other health care providers outside of the [facility] since your last visit?\" Reviewed by DERICK Franco     
Dizziness  -Recent carotid PVL without any significant stenosis.  Described as a spinning sensation when she stands.  Could be vertigo or autonomic neuropathy.  No near-syncope or syncope.  Recommended she get up slowly    8. Pulmonary embolism, unspecified chronicity, unspecified pulmonary embolism type, unspecified whether acute cor pulmonale present (HCC)  -On Eliquis 5 mg twice daily chronically.    I went over her echo and PVL results with her in detail.  I answered all questions.    No results found for any visits on 07/07/25.     Return in about 7 months (around 2/7/2026) for follow up with Dr. Spear .              An electronic signature was used to authenticate this note.    --Jessica Loomis PA-C

## 2025-09-02 ENCOUNTER — HOSPITAL ENCOUNTER (OUTPATIENT)
Facility: HOSPITAL | Age: 75
Discharge: HOME | End: 2025-09-05

## 2025-09-03 ENCOUNTER — CLINICAL DOCUMENTATION (OUTPATIENT)
Facility: HOSPITAL | Age: 75
End: 2025-09-03

## (undated) DEVICE — DEVICE SUT SHFT L34CM DIA 10MM 2 JAW LD UNIT ENDOSTCH

## (undated) DEVICE — FORCEPS BX L240CM JAW DIA2.8MM L CAP W/ NDL MIC MESH TOOTH

## (undated) DEVICE — RELOAD STPL L60MM H1-2.6MM MESENTERY THN TISS WHT 6 ROW

## (undated) DEVICE — ADHESIVE SKIN CLSR 0.7ML TOP DERMBND ADV

## (undated) DEVICE — TRUE CONTENT TO BE POPULATED AS PART OF REBRANDING: Brand: ARGYLE

## (undated) DEVICE — STAPLE INT BIOABSRB REINF FOR ETHICON FLX ENDOPATH 60 PWR +

## (undated) DEVICE — TROCAR: Brand: KII® SLEEVE

## (undated) DEVICE — SYRINGE MED 30ML STD CLR PLAS LUERLOCK TIP N CTRL DISP

## (undated) DEVICE — KENDALL 500 SERIES DIAPHORETIC FOAM MONITORING ELECTRODE - TEAR DROP SHAPE ( 30/PK): Brand: KENDALL

## (undated) DEVICE — FLEX ADVANTAGE 1500CC: Brand: FLEX ADVANTAGE

## (undated) DEVICE — BASIN EMESIS 500CC ROSE 250/CS 60/PLT: Brand: MEDEGEN MEDICAL PRODUCTS, LLC

## (undated) DEVICE — LAPAROSCOPIC TROCAR SLEEVE/SINGLE USE: Brand: KII® OPTICAL ACCESS SYSTEM

## (undated) DEVICE — RELOAD STPL L60MM H1.5-3.6MM REG TISS BLU GRIPPING SURF B

## (undated) DEVICE — SOLUTION ANTIFOG VIS SYS CLEARIFY LAPSCP

## (undated) DEVICE — SCISSORS ENDOSCP DIA5MM CRV MPLR CAUT W/ RATCH HNDL

## (undated) DEVICE — SOLUTION IV 1000ML 0.9% SOD CHL

## (undated) DEVICE — APPLICATOR LAP 35 CM 2 RIGID VISTASEAL

## (undated) DEVICE — 4-PORT MANIFOLD: Brand: NEPTUNE 2

## (undated) DEVICE — INSUFFLATION NEEDLE TO ESTABLISH PNEUMOPERITONEUM.: Brand: INSUFFLATION NEEDLE

## (undated) DEVICE — BITE BLK ENDOSCP AD 54FR GRN POLYETH ENDOSCP W STRP SLD

## (undated) DEVICE — BOWL MED L 32OZ PLAS W/ MOLD GRAD EZ OPN PEEL PCH

## (undated) DEVICE — NDL SPNE QUINCKE 20GA 3.5IN LF --

## (undated) DEVICE — BLANKET WRM W29.9XL79.1IN UP BODY FORC AIR MISTRAL-AIR

## (undated) DEVICE — TROCAR: Brand: KII® OPTICAL ACCESS SYSTEM

## (undated) DEVICE — DRAPE TOWEL: Brand: CONVERTORS

## (undated) DEVICE — SYRINGE, LUER LOCK, 10ML: Brand: MEDLINE

## (undated) DEVICE — SOLUTION IRRIG 1000ML H2O STRL BLT

## (undated) DEVICE — TAPE ADH W3INXL10YD PLAS TRNSPAR H2O RESIST HYPOALRG CURAD

## (undated) DEVICE — ELECTRODE PT RET AD L9FT HI MOIST COND ADH HYDRGEL CORDED

## (undated) DEVICE — CONTAINER PREFIL FRMLN 15ML --

## (undated) DEVICE — SYR 50ML SLIP TIP NSAF LF STRL --

## (undated) DEVICE — ELECTRODE ES 36CM LAP FLAT L HK COAT DISP CLEANCOAT

## (undated) DEVICE — SYRINGE MED 50ML LUERLOCK TIP

## (undated) DEVICE — SHEARS LAP L45CM DIA5MM ULTRASONIC CRV TIP ADV HEMSTAS HARM

## (undated) DEVICE — SUTURE ENDO STITCH POLYSORB VIOLET SIZE 3/0 7 IN 170070

## (undated) DEVICE — COVER,LIGHT HANDLE,FLX,1/PK: Brand: MEDLINE INDUSTRIES, INC.

## (undated) DEVICE — INTENDED FOR TISSUE SEPARATION, AND OTHER PROCEDURES THAT REQUIRE A SHARP SURGICAL BLADE TO PUNCTURE OR CUT.: Brand: BARD-PARKER ® STAINLESS STEEL BLADES

## (undated) DEVICE — KIT COLON W/ 1.1OZ LUB AND 2 END

## (undated) DEVICE — 2, DISPOSABLE SUCTION/IRRIGATOR WITH DISPOSABLE TIP: Brand: STRYKEFLOW

## (undated) DEVICE — MEDI-VAC NON-CONDUCTIVE SUCTION TUBING: Brand: CARDINAL HEALTH

## (undated) DEVICE — SYRINGE,TOOMEY,IRRIGATION,70CC,STERILE: Brand: MEDLINE

## (undated) DEVICE — NEEDLE HYPO 25GA L1.5IN BVL ORIENTED ECLIPSE

## (undated) DEVICE — SUTURE VCRL SZ 2-0 L27IN ABSRB UD L26MM SH 1/2 CIR J417H

## (undated) DEVICE — Z DUPLICATE USE 2423173 SEALANT FIBRIN 10 CC VISTASEAL

## (undated) DEVICE — BANDAGE,GAUZE,BULKEE II,4.5"X4.1YD,STRL: Brand: MEDLINE

## (undated) DEVICE — DEVICE SUT SZ 2-0 L7IN GRN SURGDAC POLY BRAID SGL STIT DISP

## (undated) DEVICE — GLOVE ORTHO 7 1/2   MSG9475

## (undated) DEVICE — Device

## (undated) DEVICE — DISPOSABLE LAPAROSCOPIC CLIP APPLIER WITH 20 CLIPS.: Brand: EPIX® UNIVERSAL CLIP APPLIER

## (undated) DEVICE — SUTURE MCRYL SZ 4-0 L27IN ABSRB UD L24MM PS-1 3/8 CIR PRIM Y935H

## (undated) DEVICE — STAPLER SKIN L440MM 32MM LNG 12 FIRING B FRM PWR + GRIPPING